# Patient Record
Sex: FEMALE | Race: WHITE | NOT HISPANIC OR LATINO | Employment: FULL TIME | URBAN - METROPOLITAN AREA
[De-identification: names, ages, dates, MRNs, and addresses within clinical notes are randomized per-mention and may not be internally consistent; named-entity substitution may affect disease eponyms.]

---

## 2017-02-02 ENCOUNTER — OFFICE VISIT (OUTPATIENT)
Dept: UROLOGY | Facility: CLINIC | Age: 52
End: 2017-02-02
Payer: COMMERCIAL

## 2017-02-02 VITALS
SYSTOLIC BLOOD PRESSURE: 139 MMHG | TEMPERATURE: 98.2 F | DIASTOLIC BLOOD PRESSURE: 74 MMHG | BODY MASS INDEX: 25.87 KG/M2 | WEIGHT: 146 LBS

## 2017-02-02 DIAGNOSIS — Z85.51 PERSONAL HISTORY OF MALIGNANT NEOPLASM OF BLADDER: Primary | ICD-10-CM

## 2017-02-02 PROCEDURE — 88112 CYTOPATH CELL ENHANCE TECH: CPT | Performed by: UROLOGY

## 2017-02-02 PROCEDURE — 52000 CYSTOURETHROSCOPY: CPT | Performed by: UROLOGY

## 2017-02-02 ASSESSMENT — PAIN SCALES - GENERAL: PAINLEVEL: NO PAIN (0)

## 2017-02-02 NOTE — NURSING NOTE
"Initial /74 mmHg  Temp(Src) 98.2  F (36.8  C) (Oral)  Wt 66.225 kg (146 lb) Estimated body mass index is 25.87 kg/(m^2) as calculated from the following:    Height as of 11/1/16: 1.6 m (5' 3\").    Weight as of this encounter: 66.225 kg (146 lb). .    Anitha Israel LPN    "

## 2017-02-03 LAB — COPATH REPORT: NORMAL

## 2017-02-04 NOTE — PROGRESS NOTES
Appointment source: Established Patient  Patient name: Sangita Montano  Urology Staff: Regis Wright MD    Subjective: This is a 51 year old year old female returning for follow up of bladder cancer    Objective:  Cystoscopy performed today and no evidence of neoplasm was detected within the bladder.    Urine cytology was negative.    Assessment:  History of bladder cancer    Plan:  Repeat cystoscopy in 3 months.

## 2017-05-04 ENCOUNTER — OFFICE VISIT (OUTPATIENT)
Dept: UROLOGY | Facility: CLINIC | Age: 52
End: 2017-05-04
Payer: COMMERCIAL

## 2017-05-04 VITALS
SYSTOLIC BLOOD PRESSURE: 120 MMHG | RESPIRATION RATE: 16 BRPM | DIASTOLIC BLOOD PRESSURE: 72 MMHG | WEIGHT: 146 LBS | HEART RATE: 85 BPM | HEIGHT: 63 IN | BODY MASS INDEX: 25.87 KG/M2

## 2017-05-04 DIAGNOSIS — C67.9 MALIGNANT NEOPLASM OF URINARY BLADDER, UNSPECIFIED SITE (H): Primary | ICD-10-CM

## 2017-05-04 PROCEDURE — 52000 CYSTOURETHROSCOPY: CPT | Performed by: UROLOGY

## 2017-05-04 PROCEDURE — 88112 CYTOPATH CELL ENHANCE TECH: CPT | Performed by: UROLOGY

## 2017-05-04 NOTE — NURSING NOTE
Pt brought back to the procedure room for a cystoscopy per Dr Wright.  Informed consent obtained, pt prepped in a sterile manner and a uro jet was used.      Scope serial number:  [id number]    Shahana Mejia CMA

## 2017-05-04 NOTE — MR AVS SNAPSHOT
After Visit Summary   5/4/2017    Sangita Montano    MRN: [id number]           Patient Information     Date Of Birth          1965        Visit Information        Provider Department      5/4/2017 10:00 AM DONG Wright MD [facility]        Today's Diagnoses     Malignant neoplasm of urinary bladder, unspecified site (H)    -  1      Care Instructions    Per Physician's instructions          Follow-ups after your visit        Your next 10 appointments already scheduled     Aug 08, 2017 10:00 AM CDT   CYSTO with DONG Wright MD   [facility] ([facility]    [address] [place] [address]   [phone]              Who to contact     If you have questions or need follow up information about today's clinic visit or your schedule please contact [facility] directly at [phone].  Normal or non-critical lab and imaging results will be communicated to you by MyChart, letter or phone within 4 business days after the clinic has received the results. If you do not hear from us within 7 days, please contact the clinic through MyChart or phone. If you have a critical or abnormal lab result, we will notify you by phone as soon as possible.  Submit refill requests through [employer] or call your pharmacy and they will forward the refill request to us. Please allow 3 business days for your refill to be completed.          Additional Information About Your Visit        MyChart Information     [employer] gives you secure access to your electronic health record. If you see a primary care provider, you can also send messages to your care team and make appointments. If you have questions, please call your primary care clinic.  If you do not have a primary care provider, please call [phone] and they will assist you.        Care EveryWhere ID     This is your Care EveryWhere ID. This could be used by other organizations to access your  "[place] medical records  [id number]        Your Vitals Were     Pulse Respirations Height BMI (Body Mass Index)          85 16 5' 3\" (1.6 m) 25.86 kg/m2         Blood Pressure from Last 3 Encounters:   05/04/17 120/72   02/02/17 139/74   11/01/16 145/87    Weight from Last 3 Encounters:   05/04/17 146 lb (66.2 kg)   02/02/17 146 lb (66.2 kg)   11/01/16 150 lb (68 kg)              We Performed the Following     CYSTOURETHROSCOPY     Cytology non gyn        Primary Care Provider Office Phone # Fax #    Shara Davida Campos [phone]       [facility] [address] [address] [address]        Thank you!     Thank you for choosing [facility]  for your care. Our goal is always to provide you with excellent care. Hearing back from our patients is one way we can continue to improve our services. Please take a few minutes to complete the written survey that you may receive in the mail after your visit with us. Thank you!             Your Updated Medication List - Protect others around you: Learn how to safely use, store and throw away your medicines at www.disposemymeds.org.          This list is accurate as of: 5/4/17 11:59 PM.  Always use your most recent med list.                   Brand Name Dispense Instructions for use    ascorbic acid 500 MG tablet    VITAMIN C     Take by mouth daily 6992-3867 mg daily       BIFIDOBACTERIUM BIFIDUS      300 mg 2 in am and 1 in pm       BORAGE OIL-GLA-LINOLEIC ACID PO      200 mg GLA, 3000 mg Borage oil, 600 mg Linolenic acid, 30 mg Vitamin E       fish oil-omega-3 fatty acids 1000 MG capsule      Take 1,000 mg by mouth daily 3 tablets daily       GINSENG PO          * HERBALS      cynthia di       * HERBALS      Henriquez marbin       * HERBALS      Henriquez qi       * HERBALS      salvatore king       * HERBALS      X coleen       * HERBALS      sha jd       * HERBALS      pu luang       * HERBALS      atractylodes       * HERBALS      Poria       * " HERBALS      rhemania       * HERBALS      Peony       * HERBALS      Marcia       * HERBALS      astragulus       * HERBALS      cordyceps       * HERBALS      Pseudo Ginseng       * HERBALS      Eclipta       * HERBALS      cordyalis       * HERBALS      atractylodes       * HERBALS      White peony       * HERBALS      Eliazar maohing - immune support       * HERBALS      advaclan - motagenisis       * HERBALS      Crave-control       * HERBALS      Bifidus/acidophilus 900mg takes 3 caps daily 2 in  Am 1 in pm. metagenics estrofactors multivitamin for women daily nutri -dyn dynamic fruits and greens antioxidant powder daily Ortho molecular- ortho digestyzme daily. Martina Riley       L-GLUTAMINE PO      Take 3,000 mg by mouth 1/2 tsp in am and pm       LICORICE          MAGNESIUM GLYCINATE      100 mg qd       NEW MED      Ortho Digestyme: blend of pancreatin, pepsin, bromelain, papain, betaine, taken as directed       Vitamin D-3 5000 UNITS Tabs          * Notice:  This list has 23 medication(s) that are the same as other medications prescribed for you. Read the directions carefully, and ask your doctor or other care provider to review them with you.

## 2017-05-04 NOTE — NURSING NOTE
"Chief Complaint   Patient presents with     Cystoscopy     3 month        Initial /72 (BP Location: Left arm, Patient Position: Chair, Cuff Size: Adult Regular)  Pulse 85  Resp 16  Ht 5' 3\" (1.6 m)  Wt 146 lb (66.2 kg)  BMI 25.86 kg/m2 Estimated body mass index is 25.86 kg/(m^2) as calculated from the following:    Height as of this encounter: 5' 3\" (1.6 m).    Weight as of this encounter: 146 lb (66.2 kg).  BP completed using cuff size: regular      Shahana Mejia CMA     "

## 2017-05-05 LAB — COPATH REPORT: NORMAL

## 2017-05-05 NOTE — PROGRESS NOTES
Appointment source: Established Patient  Patient name: Sangita Montano  Urology Staff: Regis Wright MD    Subjective: This is a 52 year old year old female returning for follow up of low grade bladder cancer.    Objective:  No evidence today of recurrence of bladder cancer on cystoscopy    Plan:  Return in 3 months for repeat cystoscopy

## 2017-08-08 ENCOUNTER — OFFICE VISIT (OUTPATIENT)
Dept: UROLOGY | Facility: CLINIC | Age: 52
End: 2017-08-08
Payer: COMMERCIAL

## 2017-08-08 VITALS
BODY MASS INDEX: 26.58 KG/M2 | DIASTOLIC BLOOD PRESSURE: 81 MMHG | HEART RATE: 85 BPM | SYSTOLIC BLOOD PRESSURE: 130 MMHG | HEIGHT: 63 IN | WEIGHT: 150 LBS | RESPIRATION RATE: 16 BRPM

## 2017-08-08 DIAGNOSIS — C67.2 MALIGNANT NEOPLASM OF LATERAL WALL OF URINARY BLADDER (H): Primary | ICD-10-CM

## 2017-08-08 LAB
ALBUMIN UR-MCNC: NEGATIVE MG/DL
APPEARANCE UR: CLEAR
BACTERIA #/AREA URNS HPF: ABNORMAL /HPF
BILIRUB UR QL STRIP: NEGATIVE
COLOR UR AUTO: YELLOW
GLUCOSE UR STRIP-MCNC: NEGATIVE MG/DL
HGB UR QL STRIP: ABNORMAL
KETONES UR STRIP-MCNC: NEGATIVE MG/DL
LEUKOCYTE ESTERASE UR QL STRIP: NEGATIVE
NITRATE UR QL: NEGATIVE
NON-SQ EPI CELLS #/AREA URNS LPF: ABNORMAL /LPF
PH UR STRIP: 6 PH (ref 5–7)
RBC #/AREA URNS AUTO: ABNORMAL /HPF (ref 0–2)
SP GR UR STRIP: 1.01 (ref 1–1.03)
URN SPEC COLLECT METH UR: ABNORMAL
UROBILINOGEN UR STRIP-ACNC: 0.2 EU/DL (ref 0.2–1)
WBC #/AREA URNS AUTO: ABNORMAL /HPF (ref 0–2)

## 2017-08-08 PROCEDURE — 52000 CYSTOURETHROSCOPY: CPT | Performed by: UROLOGY

## 2017-08-08 PROCEDURE — 81001 URINALYSIS AUTO W/SCOPE: CPT | Performed by: UROLOGY

## 2017-08-08 PROCEDURE — 88112 CYTOPATH CELL ENHANCE TECH: CPT | Performed by: UROLOGY

## 2017-08-08 NOTE — NURSING NOTE
"Chief Complaint   Patient presents with     Cystoscopy       Initial /81 (BP Location: Left arm, Patient Position: Chair, Cuff Size: Adult Regular)  Pulse 85  Resp 16  Ht 1.6 m (5' 3\")  Wt 68 kg (150 lb)  BMI 26.57 kg/m2 Estimated body mass index is 26.57 kg/(m^2) as calculated from the following:    Height as of this encounter: 1.6 m (5' 3\").    Weight as of this encounter: 68 kg (150 lb).  BP completed using cuff size: regular    Pt brought back to the procedure room for a cystoscopy per Dr Wright.  Informed consent obtained, pt prepped in a sterile manner and a uro jet was used.    Scope serial number:    [id number] Cande Mejia CMA  "

## 2017-08-08 NOTE — MR AVS SNAPSHOT
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  Normal or non-critical lab and imaging results will be communicated to you by MyChart, letter or phone within 4 business days after the clinic has received the results. If you do not hear from us within 7 days, please contact the clinic through [employer] or phone. If you have a critical or abnormal lab result, we will notify you by phone as soon as possible.  Submit refill requests through [employer] or call your pharmacy and they will forward the refill request to us. Please allow 3 business days for your refill to be completed.          Additional Information About Your Visit        MyChart Information     [employer] gives you secure access to your electronic health record. If you see a primary care provider, you can also send messages to your care team and make appointments. If you have questions, please call your primary care clinic.  If you do not have a primary care provider, please call [phone] and they will assist you.        Care EveryWhere ID     This is your Care EveryWhere ID. This could be used by other organizations to access your  "[place] medical records  [id number]        Your Vitals Were     Pulse Respirations Height BMI (Body Mass Index)          85 16 1.6 m (5' 3\") 26.57 kg/m2         Blood Pressure from Last 3 Encounters:   08/08/17 130/81   05/04/17 120/72   02/02/17 139/74    Weight from Last 3 Encounters:   08/08/17 68 kg (150 lb)   05/04/17 66.2 kg (146 lb)   02/02/17 66.2 kg (146 lb)              We Performed the Following     CYSTOURETHROSCOPY     Cytology non gyn     UA with Microscopic        Primary Care Provider Office Phone # Fax #    Shara Davida Campos [phone] [address] [address]        Equal Access to Services     TONEY GO : Hadii gucci acuñao Socassidy, waaxda luqadaha, qaybta kaalmada adeegyada, jared vasquez . So [facility] [phone].    ATENCIÓN: Si habla español, tiene a landa disposición servicios gratuitos de asistencia lingüística. Llame al [phone].    We comply with applicable federal civil rights laws and [place] laws. We do not discriminate on the basis of race, color, national origin, age, disability sex, sexual orientation or gender identity.            Thank you!     Thank you for choosing [facility]  for your care. Our goal is always to provide you with excellent care. Hearing back from our patients is one way we can continue to improve our services. Please take a few minutes to complete the written survey that you may receive in the mail after your visit with us. Thank you!             Your Updated Medication List - Protect others around you: Learn how to safely use, store and throw away your medicines at www.disposemymeds.org.          This list is accurate as of: 8/8/17 11:59 PM.  Always use your most recent med list.                   Brand Name Dispense Instructions for use Diagnosis    ascorbic acid 500 MG tablet    VITAMIN C     Take by mouth daily 5802-1998 mg daily        BIFIDOBACTERIUM BIFIDUS      300 " mg 2 in am and 1 in pm        BORAGE OIL-GLA-LINOLEIC ACID PO      200 mg GLA, 3000 mg Borage oil, 600 mg Linolenic acid, 30 mg Vitamin E        fish oil-omega-3 fatty acids 1000 MG capsule      Take 1,000 mg by mouth daily 3 tablets daily        GINSENG PO           * HERBALS      cynthia di        * HERBALS      Henriquez marbin        * HERBALS      Henriquez qi        * HERBALS      salvatore king        * HERBALS      X coleen        * HERBALS      sha jd        * HERBALS      pu luang        * HERBALS      atractylodes        * HERBALS      Poria        * HERBALS      rhemania        * HERBALS      Peony        * HERBALS      Marcia        * HERBALS      astragulus        * HERBALS      cordyceps        * HERBALS      Pseudo Ginseng        * HERBALS      Eclipta        * HERBALS      cordyalis        * HERBALS      atractylodes        * HERBALS      White peony        * HERBALS      Eliazar maohing - immune support        * HERBALS      advaclan - motagenisis        * HERBALS      Crave-control        * HERBALS      Bifidus/acidophilus 900mg takes 3 caps daily 2 in  Am 1 in pm. [employer]ics estrofactors multivitamin for women daily nutri -dyn dynamic fruits and greens antioxidant powder daily Ortho molecular- ortho digestyzme daily. Martina Riley        L-GLUTAMINE PO      Take 3,000 mg by mouth 1/2 tsp in am and pm        LICORICE           MAGNESIUM GLYCINATE      100 mg qd        NEW MED      Ortho Digestyme: blend of pancreatin, pepsin, bromelain, papain, betaine, taken as directed    Dyspepsia and other specified disorders of function of stomach       Vitamin D-3 5000 UNITS Tabs           * Notice:  This list has 23 medication(s) that are the same as other medications prescribed for you. Read the directions carefully, and ask your doctor or other care provider to review them with you.

## 2017-08-09 NOTE — PROGRESS NOTES
Appointment source: Established Patient  Patient name: Sangita Montano  Urology Staff: Regis Wright MD    Subjective: This is a 52 year old year old female returning for follow up of bladder cancer    Objective:  Cystoscopy today revealed no evidence of neoplasm    Assessment:  History of low grade non invasive bladder cancer.    Plan:  Continued surveillance by cystoscopy.

## 2017-08-10 LAB — COPATH REPORT: NORMAL

## 2017-09-09 ENCOUNTER — HEALTH MAINTENANCE LETTER (OUTPATIENT)
Age: 52
End: 2017-09-09

## 2017-11-14 ENCOUNTER — OFFICE VISIT (OUTPATIENT)
Dept: UROLOGY | Facility: CLINIC | Age: 52
End: 2017-11-14
Payer: COMMERCIAL

## 2017-11-14 VITALS — HEART RATE: 88 BPM | RESPIRATION RATE: 16 BRPM | SYSTOLIC BLOOD PRESSURE: 121 MMHG | DIASTOLIC BLOOD PRESSURE: 75 MMHG

## 2017-11-14 DIAGNOSIS — C67.9 MALIGNANT NEOPLASM OF URINARY BLADDER, UNSPECIFIED SITE (H): Primary | ICD-10-CM

## 2017-11-14 PROCEDURE — 52000 CYSTOURETHROSCOPY: CPT | Performed by: UROLOGY

## 2017-11-14 PROCEDURE — 88112 CYTOPATH CELL ENHANCE TECH: CPT | Performed by: UROLOGY

## 2017-11-14 PROCEDURE — 99207 ZZC NO CHARGE LOS: CPT | Performed by: UROLOGY

## 2017-11-14 NOTE — NURSING NOTE
"Chief Complaint   Patient presents with     Cystoscopy     history of bladder cancer        Initial /75 (BP Location: Left arm, Patient Position: Chair, Cuff Size: Adult Regular)  Pulse 88  Resp 16 Estimated body mass index is 26.57 kg/(m^2) as calculated from the following:    Height as of 8/8/17: 1.6 m (5' 3\").    Weight as of 8/8/17: 68 kg (150 lb).  BP completed using cuff size: regular      Shahana Mejia CMA     "

## 2017-11-14 NOTE — MR AVS SNAPSHOT
After Visit Summary   11/14/2017    Sangita Montano    MRN: [id number]           Patient Information     Date Of Birth          1965        Visit Information        Provider Department      11/14/2017 10:15 AM DONG Wright MD [facility]        Today's Diagnoses     Malignant neoplasm of urinary bladder, unspecified site (H)    -  1      Care Instructions    Per Physician's instructions            Follow-ups after your visit        Your next 10 appointments already scheduled     Feb 13, 2018 10:15 AM CST   CYSTO with DONG Wright MD   [facility]    [address] [address]   [phone]              Who to contact     If you have questions or need follow up information about today's clinic visit or your schedule please contact [facility] directly at [phone].  Normal or non-critical lab and imaging results will be communicated to you by MyChart, letter or phone within 4 business days after the clinic has received the results. If you do not hear from us within 7 days, please contact the clinic through MyChart or phone. If you have a critical or abnormal lab result, we will notify you by phone as soon as possible.  Submit refill requests through [employer] or call your pharmacy and they will forward the refill request to us. Please allow 3 business days for your refill to be completed.          Additional Information About Your Visit        MyChart Information     [employer] gives you secure access to your electronic health record. If you see a primary care provider, you can also send messages to your care team and make appointments. If you have questions, please call your primary care clinic.  If you do not have a primary care provider, please call [phone] and they will assist you.        Care EveryWhere ID     This is your Care EveryWhere ID. This could be used by other organizations to access  your [place] medical records  [id number]        Your Vitals Were     Pulse Respirations                88 16           Blood Pressure from Last 3 Encounters:   11/14/17 121/75   08/08/17 130/81   05/04/17 120/72    Weight from Last 3 Encounters:   08/08/17 68 kg (150 lb)   05/04/17 66.2 kg (146 lb)   02/02/17 66.2 kg (146 lb)              We Performed the Following     CYSTOURETHROSCOPY     Cytology non gyn        Primary Care Provider Office Phone # Fax #    [facility] [place] [phone] [address]  [facility] [address]        Equal Access to Services     TONEY GO : Hadii gucci Bales, waaxda lumariselaadaha, qaybta kaalmada dick, jared valle. So [facility] [phone].    ATENCIÓN: Si habla español, tiene a landa disposición servicios gratuitos de asistencia lingüística. Llame al [phone].    We comply with applicable federal civil rights laws and [place] laws. We do not discriminate on the basis of race, color, national origin, age, disability, sex, sexual orientation, or gender identity.            Thank you!     Thank you for choosing [facility]  for your care. Our goal is always to provide you with excellent care. Hearing back from our patients is one way we can continue to improve our services. Please take a few minutes to complete the written survey that you may receive in the mail after your visit with us. Thank you!             Your Updated Medication List - Protect others around you: Learn how to safely use, store and throw away your medicines at www.disposemymeds.org.          This list is accurate as of: 11/14/17 11:59 PM.  Always use your most recent med list.                   Brand Name Dispense Instructions for use Diagnosis    ascorbic acid 500 MG tablet    VITAMIN C     Take by mouth daily 6074-5927 mg daily        BIFIDOBACTERIUM BIFIDUS      300 mg 2 in am and 1 in pm        BORAGE OIL-GLA-LINOLEIC  ACID PO      200 mg GLA, 3000 mg Borage oil, 600 mg Linolenic acid, 30 mg Vitamin E        fish oil-omega-3 fatty acids 1000 MG capsule      Take 1,000 mg by mouth daily 3 tablets daily        GINSENG PO           * HERBALS      cynthia di        * HERBALS      Henriquez marbin        * HERBALS      Henriquez qi        * HERBALS      salvatore king        * HERBALS      X coleen        * HERBALS      sha jd        * HERBALS      pu luang        * HERBALS      atractylodes        * HERBALS      Poria        * HERBALS      rhemania        * HERBALS      Peony        * HERBALS      Marcia        * HERBALS      astragulus        * HERBALS      cordyceps        * HERBALS      Pseudo Ginseng        * HERBALS      Eclipta        * HERBALS      cordyalis        * HERBALS      atractylodes        * HERBALS      White peony        * HERBALS      Eliazar maohing - immune support        * HERBALS      advaclan - motagenisis        * HERBALS      Crave-control        * HERBALS      Bifidus/acidophilus 900mg takes 3 caps daily 2 in  Am 1 in pm. metagenics estrofactors multivitamin for women daily nutri -dyn dynamic fruits and greens antioxidant powder daily Ortho molecular- ortho digestyzme daily. Martina Riley        L-GLUTAMINE PO      Take 3,000 mg by mouth 1/2 tsp in am and pm        LICORICE           MAGNESIUM GLYCINATE      100 mg qd        NEW MED      Ortho Digestyme: blend of pancreatin, pepsin, bromelain, papain, betaine, taken as directed    Dyspepsia and other specified disorders of function of stomach       Vitamin D-3 5000 UNITS Tabs           * Notice:  This list has 23 medication(s) that are the same as other medications prescribed for you. Read the directions carefully, and ask your doctor or other care provider to review them with you.

## 2017-11-14 NOTE — NURSING NOTE
Pt brought back to the procedure room for a cystoscopy per Dr. Wright Informed consent obtained, pt prepped in a sterile manner and a uro jet was used.      Scope serial number: [id number] Cande Mejia CMA

## 2017-11-15 LAB — COPATH REPORT: NORMAL

## 2017-11-15 NOTE — PROGRESS NOTES
Appointment source: Established Patient  Patient name: Sangita Montano  Urology Staff: Regis Wright MD    Subjective: This is a 52 year old year old female returning for follow up of low grade bladder cancer    Objective:  Cystoscopy performed today revealed no evidence of recurrent bladder cancer.    Plan:  Return in 3 months.

## 2018-02-13 ENCOUNTER — OFFICE VISIT (OUTPATIENT)
Dept: UROLOGY | Facility: CLINIC | Age: 53
End: 2018-02-13
Payer: COMMERCIAL

## 2018-02-13 VITALS
SYSTOLIC BLOOD PRESSURE: 120 MMHG | TEMPERATURE: 98.3 F | HEART RATE: 88 BPM | HEIGHT: 63 IN | WEIGHT: 158 LBS | BODY MASS INDEX: 28 KG/M2 | DIASTOLIC BLOOD PRESSURE: 75 MMHG

## 2018-02-13 DIAGNOSIS — C67.9 MALIGNANT NEOPLASM OF URINARY BLADDER, UNSPECIFIED SITE (H): Primary | ICD-10-CM

## 2018-02-13 PROCEDURE — 88112 CYTOPATH CELL ENHANCE TECH: CPT | Performed by: UROLOGY

## 2018-02-13 PROCEDURE — 99207 ZZC NO CHARGE LOS: CPT | Performed by: UROLOGY

## 2018-02-13 PROCEDURE — 52000 CYSTOURETHROSCOPY: CPT | Performed by: UROLOGY

## 2018-02-13 NOTE — MR AVS SNAPSHOT
After Visit Summary   2/13/2018    Sangita Montano    MRN: [id number]           Patient Information     Date Of Birth          1965        Visit Information        Provider Department      2/13/2018 10:15 AM DONG Wright MD [facility]        Today's Diagnoses     Malignant neoplasm of urinary bladder, unspecified site (H)    -  1      Care Instructions    Per Dr. Wright's instructions          Follow-ups after your visit        Your next 10 appointments already scheduled     May 15, 2018 10:15 AM CDT   CYSTO with DONG Wright MD   [facility]    [address] [place] [address]   [phone]              Who to contact     If you have questions or need follow up information about today's clinic visit or your schedule please contact [facility] directly at [phone].  Normal or non-critical lab and imaging results will be communicated to you by MyChart, letter or phone within 4 business days after the clinic has received the results. If you do not hear from us within 7 days, please contact the clinic through [employer]Casth[employer] or phone. If you have a critical or abnormal lab result, we will notify you by phone as soon as possible.  Submit refill requests through [employer] or call your pharmacy and they will forward the refill request to us. Please allow 3 business days for your refill to be completed.          Additional Information About Your Visit        MyChart Information     [employer]owi gives you secure access to your electronic health record. If you see a primary care provider, you can also send messages to your care team and make appointments. If you have questions, please call your primary care clinic.  If you do not have a primary care provider, please call [phone] and they will assist you.        Care EveryWhere ID     This is your Care EveryWhere ID. This could be used by other organizations to access your  "[facility] medical records  [id number]        Your Vitals Were     Pulse Temperature Height BMI (Body Mass Index)          88 98.3  F (36.8  C) (Oral) 1.6 m (5' 3\") 27.99 kg/m2         Blood Pressure from Last 3 Encounters:   02/13/18 120/75   11/14/17 121/75   08/08/17 130/81    Weight from Last 3 Encounters:   02/13/18 71.7 kg (158 lb)   08/08/17 68 kg (150 lb)   05/04/17 66.2 kg (146 lb)              We Performed the Following     CYSTOURETHROSCOPY     Cytology non gyn        Primary Care Provider Office Phone # Fax #    [facility] [phone] [address] [address]        Equal Access to Services     TONEY GO : Ronen Bales, waaxda luqadaha, qaybta kaalmada dick, jared valle. So [facility] [phone].    ATENCIÓN: Si habla español, tiene a landa disposición servicios gratuitos de asistencia lingüística. Paulette al [phone].    We comply with applicable federal civil rights laws and [place] laws. We do not discriminate on the basis of race, color, national origin, age, disability, sex, sexual orientation, or gender identity.            Thank you!     Thank you for choosing [facility]  for your care. Our goal is always to provide you with excellent care. Hearing back from our patients is one way we can continue to improve our services. Please take a few minutes to complete the written survey that you may receive in the mail after your visit with us. Thank you!             Your Updated Medication List - Protect others around you: Learn how to safely use, store and throw away your medicines at www.disposemymeds.org.          This list is accurate as of 2/13/18 10:52 AM.  Always use your most recent med list.                   Brand Name Dispense Instructions for use Diagnosis    ascorbic acid 500 MG tablet    VITAMIN C     Take by mouth daily 7039-4708 mg daily        BIFIDOBACTERIUM BIFIDUS    "   300 mg 2 in am and 1 in pm        BORAGE OIL-GLA-LINOLEIC ACID PO      200 mg GLA, 3000 mg Borage oil, 600 mg Linolenic acid, 30 mg Vitamin E        fish oil-omega-3 fatty acids 1000 MG capsule      Take 1,000 mg by mouth daily 3 tablets daily        GINSENG PO           * HERBALS      cynthia di        * HERBALS      Henriquez marbin        * HERBALS      Henriquez qi        * HERBALS      salvatore king        * HERBALS      X coleen        * HERBALS      sha jd        * HERBALS      pu luang        * HERBALS      atractylodes        * HERBALS      Poria        * HERBALS      rhemania        * HERBALS      Peony        * HERBALS      Marcia        * HERBALS      astragulus        * HERBALS      cordyceps        * HERBALS      Pseudo Ginseng        * HERBALS      Eclipta        * HERBALS      cordyalis        * HERBALS      atractylodes        * HERBALS      White peony        * HERBALS      Eliazar maohing - immune support        * HERBALS      advaclan - motagenisis        * HERBALS      Crave-control        * HERBALS      Bifidus/acidophilus 900mg takes 3 caps daily 2 in  Am 1 in pm. metagenics estrofactors multivitamin for women daily nutri -dyn dynamic fruits and greens antioxidant powder daily Ortho molecular- ortho digestyzme daily. Martina Riley        L-GLUTAMINE PO      Take 3,000 mg by mouth 1/2 tsp in am and pm        LICORICE           MAGNESIUM GLYCINATE      100 mg qd        NEW MED      Ortho Digestyme: blend of pancreatin, pepsin, bromelain, papain, betaine, taken as directed    Dyspepsia and other specified disorders of function of stomach       Vitamin D-3 5000 UNITS Tabs           * Notice:  This list has 23 medication(s) that are the same as other medications prescribed for you. Read the directions carefully, and ask your doctor or other care provider to review them with you.

## 2018-02-13 NOTE — PROGRESS NOTES
Appointment source: Established Patient  Patient name: Sangita Montano  Urology Staff: Regis Wright MD    Subjective: This is a 53 year old year old female returning for follow up of low grade bladder cancer diagnosed in 2013.    Objective:  No evidence of recurrence on today's cystoscopy.    Plan:  Return in 3 months for follow up.

## 2018-02-13 NOTE — NURSING NOTE
Pt brought back to the procedure room for a cystoscopy per Dr. Wright Informed consent obtained, pt prepped in a sterile manner and a uro jet was used.      Scope serial number: [id number] Cande Mejia CMA

## 2018-02-13 NOTE — NURSING NOTE
"Initial /75 (BP Location: Left arm, Patient Position: Sitting, Cuff Size: Adult Regular)  Pulse 88  Temp 98.3  F (36.8  C) (Oral)  Ht 1.6 m (5' 3\")  Wt 71.7 kg (158 lb)  BMI 27.99 kg/m2 Estimated body mass index is 27.99 kg/(m^2) as calculated from the following:    Height as of this encounter: 1.6 m (5' 3\").    Weight as of this encounter: 71.7 kg (158 lb). .    Edith Manzanares MA    "

## 2018-02-14 LAB — COPATH REPORT: NORMAL

## 2018-05-15 ENCOUNTER — OFFICE VISIT (OUTPATIENT)
Dept: UROLOGY | Facility: CLINIC | Age: 53
End: 2018-05-15
Payer: COMMERCIAL

## 2018-05-15 VITALS
SYSTOLIC BLOOD PRESSURE: 122 MMHG | RESPIRATION RATE: 16 BRPM | HEART RATE: 86 BPM | DIASTOLIC BLOOD PRESSURE: 76 MMHG | TEMPERATURE: 98.7 F

## 2018-05-15 DIAGNOSIS — C67.2 MALIGNANT NEOPLASM OF LATERAL WALL OF URINARY BLADDER (H): Primary | ICD-10-CM

## 2018-05-15 PROCEDURE — 52000 CYSTOURETHROSCOPY: CPT | Performed by: UROLOGY

## 2018-05-15 PROCEDURE — 88112 CYTOPATH CELL ENHANCE TECH: CPT | Performed by: UROLOGY

## 2018-05-15 NOTE — NURSING NOTE
"Chief Complaint   Patient presents with     Cystoscopy     History of Bladder Cancer        Initial /76 (BP Location: Right arm, Patient Position: Chair, Cuff Size: Adult Regular)  Pulse 86  Temp 98.7  F (37.1  C) (Tympanic)  Resp 16 Estimated body mass index is 27.99 kg/(m^2) as calculated from the following:    Height as of 2/13/18: 1.6 m (5' 3\").    Weight as of 2/13/18: 71.7 kg (158 lb).  BP completed using cuff size: regular  Medications and allergies reviewed.      Shahana WILLIAM CMA     "

## 2018-05-15 NOTE — PROGRESS NOTES
Appointment source: Established Patient  Patient name: Sangita Montano  Urology Staff: Regis Wright MD    Subjective: This is a 53 year old year old female returning for follow up of bladder cancer    Objective:  Cystoscopy was negative for bladder tumor.    Plan:  Return in 4 months for repeat cystoscopy

## 2018-05-15 NOTE — NURSING NOTE
Pt brought back to the procedure room for a cystoscopy per Dr. Wright Informed consent obtained, pt prepped in a sterile manner and a uro jet was used.      Scope serial number: [id number] Olymp[employer]      Shahana M, CMA

## 2018-05-15 NOTE — MR AVS SNAPSHOT
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  Normal or non-critical lab and imaging results will be communicated to you by MyChart, letter or phone within 4 business days after the clinic has received the results. If you do not hear from us within 7 days, please contact the clinic through Keenkohart or phone. If you have a critical or abnormal lab result, we will notify you by phone as soon as possible.  Submit refill requests through [employer] or call your pharmacy and they will forward the refill request to us. Please allow 3 business days for your refill to be completed.          Additional Information About Your Visit        MyChart Information     [employer] gives you secure access to your electronic health record. If you see a primary care provider, you can also send messages to your care team and make appointments. If you have questions, please call your primary care clinic.  If you do not have a primary care provider, please call [phone] and they will assist you.        Care EveryWhere ID     This is your Care EveryWhere ID. This could be used by other organizations to access your  [place] medical records  [id number]        Your Vitals Were     Pulse Temperature Respirations             86 98.7  F (37.1  C) (Tympanic) 16          Blood Pressure from Last 3 Encounters:   05/15/18 122/76   02/13/18 120/75   11/14/17 121/75    Weight from Last 3 Encounters:   02/13/18 71.7 kg (158 lb)   08/08/17 68 kg (150 lb)   05/04/17 66.2 kg (146 lb)              We Performed the Following     CYSTOURETHROSCOPY (107[address])     Cytology non gyn        Primary Care Provider Office Phone # Fax #    [facility] [phone] [address] [facility] [address]        Equal Access to Services     TONEY GO : Hadii gucci acuñao Socassidy, waaxda luqadaha, qaybta kaalmada adeegyada, jared valle. So [facility] [phone].    ATENCIÓN: Si habla español, tiene a landa disposición servicios gratuitos de asistencia lingüística. [facility] [phone].    We comply with applicable federal civil rights laws and [place] laws. We do not discriminate on the basis of race, color, national origin, age, disability, sex, sexual orientation, or gender identity.            Thank you!     Thank you for choosing [facility]  for your care. Our goal is always to provide you with excellent care. Hearing back from our patients is one way we can continue to improve our services. Please take a few minutes to complete the written survey that you may receive in the mail after your visit with us. Thank you!             Your Updated Medication List - Protect others around you: Learn how to safely use, store and throw away your medicines at www.disposemymeds.org.          This list is accurate as of 5/15/18 10:55 AM.  Always use your most recent med list.                   Brand Name Dispense Instructions for use Diagnosis    ascorbic acid 500 MG tablet    VITAMIN C     Take by mouth daily 6897-0387 mg daily        BIFIDOBACTERIUM BIFIDUS      300 mg 2 in am and 1  in pm        BORAGE OIL-GLA-LINOLEIC ACID PO      200 mg GLA, 3000 mg Borage oil, 600 mg Linolenic acid, 30 mg Vitamin E        fish oil-omega-3 fatty acids 1000 MG capsule      Take 1,000 mg by mouth daily 3 tablets daily        GINSENG PO           * HERBALS      cynthia di        * HERBALS      Henriquez marbin        * HERBALS      Henriquez qi        * HERBALS      salvatore king        * HERBALS      X coleen        * HERBALS      sha jd        * HERBALS      pu luang        * HERBALS      atractylodes        * HERBALS      Poria        * HERBALS      rhemania        * HERBALS      Peony        * HERBALS      Marcia        * HERBALS      astragulus        * HERBALS      cordyceps        * HERBALS      Pseudo Ginseng        * HERBALS      Eclipta        * HERBALS      cordyalis        * HERBALS      atractylodes        * HERBALS      White peony        * HERBALS      Eliazar maohing - immune support        * HERBALS      advaclan - motagenisis        * HERBALS      Crave-control        * HERBALS      Bifidus/acidophilus 900mg takes 3 caps daily 2 in  Am 1 in pm. metagenics estrofactors multivitamin for women daily nutri -dyn dynamic fruits and greens antioxidant powder daily Ortho molecular- ortho digestyzme daily. Martina Riley        L-GLUTAMINE PO      Take 3,000 mg by mouth 1/2 tsp in am and pm        LICORICE           MAGNESIUM GLYCINATE      100 mg qd        NEW MED      Ortho Digestyme: blend of pancreatin, pepsin, bromelain, papain, betaine, taken as directed    Dyspepsia and other specified disorders of function of stomach       Vitamin D-3 5000 units Tabs           * Notice:  This list has 23 medication(s) that are the same as other medications prescribed for you. Read the directions carefully, and ask your doctor or other care provider to review them with you.

## 2018-05-16 LAB — COPATH REPORT: NORMAL

## 2018-09-06 ENCOUNTER — OFFICE VISIT (OUTPATIENT)
Dept: FAMILY MEDICINE | Facility: CLINIC | Age: 53
End: 2018-09-06
Payer: COMMERCIAL

## 2018-09-06 VITALS
DIASTOLIC BLOOD PRESSURE: 68 MMHG | HEART RATE: 80 BPM | WEIGHT: 163.2 LBS | TEMPERATURE: 98.9 F | SYSTOLIC BLOOD PRESSURE: 122 MMHG | BODY MASS INDEX: 28.91 KG/M2

## 2018-09-06 DIAGNOSIS — G44.219 EPISODIC TENSION-TYPE HEADACHE, NOT INTRACTABLE: ICD-10-CM

## 2018-09-06 DIAGNOSIS — R53.83 FATIGUE, UNSPECIFIED TYPE: ICD-10-CM

## 2018-09-06 DIAGNOSIS — R50.9 FEVER, UNSPECIFIED FEVER CAUSE: ICD-10-CM
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
## 2018-09-06 DIAGNOSIS — M79.10 MYALGIA: ICD-10-CM

## 2018-09-06 LAB
ALT SERPL W P-5'-P-CCNC: 34 U/L (ref 0–50)
AST SERPL W P-5'-P-CCNC: 22 U/L (ref 0–45)
BASOPHILS # BLD AUTO: 0 10E9/L (ref 0–0.2)
BASOPHILS NFR BLD AUTO: 0.3 %
DIFFERENTIAL METHOD BLD: NORMAL
EOSINOPHIL # BLD AUTO: 0.2 10E9/L (ref 0–0.7)
EOSINOPHIL NFR BLD AUTO: 1.9 %
ERYTHROCYTE [DISTWIDTH] IN BLOOD BY AUTOMATED COUNT: 13.8 % (ref 10–15)
HCT VFR BLD AUTO: 43.4 % (ref 35–47)
HGB BLD-MCNC: 14.1 G/DL (ref 11.7–15.7)
LYMPHOCYTES # BLD AUTO: 2.4 10E9/L (ref 0.8–5.3)
LYMPHOCYTES NFR BLD AUTO: 26.9 %
MCH RBC QN AUTO: 29.3 PG (ref 26.5–33)
MCHC RBC AUTO-ENTMCNC: 32.5 G/DL (ref 31.5–36.5)
MCV RBC AUTO: 90 FL (ref 78–100)
MONOCYTES # BLD AUTO: 0.8 10E9/L (ref 0–1.3)
MONOCYTES NFR BLD AUTO: 8.8 %
NEUTROPHILS # BLD AUTO: 5.6 10E9/L (ref 1.6–8.3)
NEUTROPHILS NFR BLD AUTO: 62.1 %
PLATELET # BLD AUTO: 293 10E9/L (ref 150–450)
RBC # BLD AUTO: 4.81 10E12/L (ref 3.8–5.2)
WBC # BLD AUTO: 9.1 10E9/L (ref 4–11)

## 2018-09-06 PROCEDURE — 99000 SPECIMEN HANDLING OFFICE-LAB: CPT | Performed by: NURSE PRACTITIONER

## 2018-09-06 PROCEDURE — 84450 TRANSFERASE (AST) (SGOT): CPT | Performed by: NURSE PRACTITIONER

## 2018-09-06 PROCEDURE — 85025 COMPLETE CBC W/AUTO DIFF WBC: CPT | Performed by: NURSE PRACTITIONER

## 2018-09-06 PROCEDURE — 86666 EHRLICHIA ANTIBODY: CPT | Mod: 90 | Performed by: NURSE PRACTITIONER

## 2018-09-06 PROCEDURE — 36415 COLL VENOUS BLD VENIPUNCTURE: CPT | Performed by: NURSE PRACTITIONER

## 2018-09-06 PROCEDURE — 86618 LYME DISEASE ANTIBODY: CPT | Performed by: NURSE PRACTITIONER

## 2018-09-06 PROCEDURE — 99213 OFFICE O/P EST LOW 20 MIN: CPT | Performed by: NURSE PRACTITIONER

## 2018-09-06 PROCEDURE — 84460 ALANINE AMINO (ALT) (SGPT): CPT | Performed by: NURSE PRACTITIONER

## 2018-09-06 NOTE — PATIENT INSTRUCTIONS
We will draw for tick born diseases and contact you when results are returned.    Follow up with your primary care provider if symptoms worsen or do not resolve.    Follow-up with your primary care provider next week and as needed.    Indications for emergent return to emergency department discussed with patient, who verbalized good understanding and agreement.  Patient understands the limitations of today's evaluation.         Lyme Disease  Lyme disease is caused by bacteria. The infection is most often passed during the bite of a deer tick. The tick is very small, so many people with Lyme disease do not know they have been bitten. Tests for Lyme disease are not always accurate early in the disease. If the disease is suspected, treatment may begin before testing confirms the infection. A long course of antibiotics is the standard treatment.  If untreated, Lyme disease can worsen and full-body symptoms can develop          Early local symptoms may appear within a few days to a month after the tick bite. These symptoms may include a round, red rash that looks like a bull's-eye target with darker outer ring and a darker center. There may fever, chills, fatigue, body aches, and headache. In time, the rash goes away, even without treatment. That doesn't mean the infection has gone away, however. In some cases, early local symptoms never develop.    Early disseminated symptoms may appear weeks to months after the bite. These can include muscle aches, fatigue, fever, headache, stiff neck, and joint pain and swelling.    Late-stage symptoms include weakness in an arm, leg or one side of the face, headache, fever, and numbness and tingling in the arms or legs, confusion, and memory loss.  Testing is done for the presence of the bacteria. When the infection is treated early, it can be cured. In some cases, a second or third course of antibiotics may be needed. Be sure to follow your healthcare providers directions about  treatment.  Home care  If oral antibiotics have been prescribed, take them exactly as directed until they are completely gone. Do not stop taking them until you have taken the full course or your healthcare provider has told you to stop.  Ask your healthcare provider about taking over-the-counter medicines to control symptoms such as aches and fever.  Follow-up care  Follow up with your healthcare provider as advised. Be sure to return for follow-up testing as directed to be sure the infection has been treated.  When to seek medical advice  Call your healthcare provider right away if any of the following occur:    Current symptoms get worse    Unexplained fever, neck pain or stiffness, or headache    Arm, leg or facial weakness    Joint pain or swelling    Numbness and tingling in the arms or legs    Confusion or memory loss    Irregular or rapid heartbeat  Date Last Reviewed: 9/25/2015 2000-2017 [employer]. [address] [place] [address]. All rights reserved. This information is not intended as a substitute for professional medical care. Always follow your healthcare professional's instructions.        Understanding Ehrlichiosis  When a tick bites you, it can cause an infection. Some types of ticks can pass on the bacteria that cause ehrlichiosis. This infection can develop into a serious illness. If you get a tick bite and then develop symptoms, talk with your healthcare provider right away.  How to say it:  [id number]   What causes ehrlichiosis?  The bacteria that cause ehrlichiosis are passed to people through tick bites. Ticks are most active between April and September. The risk of getting bitten and infected is higher during these months. Ehrlichiosis may also be passed on through blood transfusions or by direct contact with an infected deer.  What are the symptoms of ehrlichiosis?  Symptoms usually show up 1 to 2 weeks after you are bitten by a tick. Symptoms may  include:    Fever or chills    Headache    Muscle or joint pain    Tiredness or feeling unwell    Swollen lymph nodes    Rash (more common in children)    Red eyes    Nausea, vomiting, or diarrhea    Confusion    Cough  How is ehrlichiosis treated?  Treatment focuses on killing the bacteria that cause ehrlichiosis. This is done by taking antibiotics. Other medicines can help relieve pain.  How can I prevent ehrlichiosis?  Avoiding tick bites is the best way to prevent ehrlichiosis. Here are some ways to avoid getting tick bites:    Put insect repellent containing DEET on exposed skin when you are outside. Use DEET very cautiously on young children.    Treat clothing and hiking or camping gear with products that have permethrin.    Avoid walking through brush and grass.    Look for ticks on yourself when you have been outdoors. Check all parts of your body. Remove any ticks you find right away.    If you have any pets, check them for ticks after they have been outdoors  What are the possible complications of ehrlichiosis?  Children and people with a weak immune system are more likely than healthy adults to have complications. Complications of ehrlichiosis can include:    Difficulty breathing    Bleeding problems    Liver or kidney failure    Inflammation or infection of the brain or its covering    Death  When should I call my healthcare provider?  Call your healthcare provider right away if you have any of these:    Fever of 100.4 F (38 C) or higher, or as directed    Pain that gets worse    Symptoms that don t get better with treatment, or symptoms that get worse    New symptoms   Date Last Reviewed: 3/30/2016    1669-3730 [employer]. [address] [place] [address], [place] [address]. All rights reserved. This information is not intended as a substitute for professional medical care. Always follow your healthcare professional's instructions.

## 2018-09-06 NOTE — NURSING NOTE
"Chief Complaint   Patient presents with     Muscle Pain       Initial /68 (BP Location: Right arm, Cuff Size: Adult Regular)  Pulse 80  Temp 98.9  F (37.2  C) (Tympanic)  Wt 163 lb 3.2 oz (74 kg)  BMI 28.91 kg/m2 Estimated body mass index is 28.91 kg/(m^2) as calculated from the following:    Height as of 2/13/18: 5' 3\" (1.6 m).    Weight as of this encounter: 163 lb 3.2 oz (74 kg).      Health Maintenance that is potentially due pending provider review:  NONE    n/a    Is there anyone who you would like to be able to receive your results? Not Applicable  If yes have patient fill out SHANA    Sobia Bautista M.A.    "

## 2018-09-06 NOTE — MR AVS SNAPSHOT
After Visit Summary   9/6/2018    Sangita Montano    MRN: [id number]           Patient Information     Date Of Birth          1965        Visit Information        Provider Department      9/6/2018 4:20 PM Ember Jules APRN [facility]        Today's Diagnoses     Tick bite, initial encounter    -  1    Episodic tension-type headache, not intractable        Fatigue, unspecified type        Fever, unspecified fever cause        Myalgia          Care Instructions    We will draw for tick born diseases and contact you when results are returned.    Follow up with your primary care provider if symptoms worsen or do not resolve.    Follow-up with your primary care provider next week and as needed.    Indications for emergent return to emergency department discussed with patient, who verbalized good understanding and agreement.  Patient understands the limitations of today's evaluation.         Lyme Disease  Lyme disease is caused by bacteria. The infection is most often passed during the bite of a deer tick. The tick is very small, so many people with Lyme disease do not know they have been bitten. Tests for Lyme disease are not always accurate early in the disease. If the disease is suspected, treatment may begin before testing confirms the infection. A long course of antibiotics is the standard treatment.  If untreated, Lyme disease can worsen and full-body symptoms can develop          Early local symptoms may appear within a few days to a month after the tick bite. These symptoms may include a round, red rash that looks like a bull's-eye target with darker outer ring and a darker center. There may fever, chills, fatigue, body aches, and headache. In time, the rash goes away, even without treatment. That doesn't mean the infection has gone away, however. In some cases, early local symptoms never develop.    Early disseminated symptoms may appear weeks to months  after the bite. These can include muscle aches, fatigue, fever, headache, stiff neck, and joint pain and swelling.    Late-stage symptoms include weakness in an arm, leg or one side of the face, headache, fever, and numbness and tingling in the arms or legs, confusion, and memory loss.  Testing is done for the presence of the bacteria. When the infection is treated early, it can be cured. In some cases, a second or third course of antibiotics may be needed. Be sure to follow your healthcare providers directions about treatment.  Home care  If oral antibiotics have been prescribed, take them exactly as directed until they are completely gone. Do not stop taking them until you have taken the full course or your healthcare provider has told you to stop.  Ask your healthcare provider about taking over-the-counter medicines to control symptoms such as aches and fever.  Follow-up care  Follow up with your healthcare provider as advised. Be sure to return for follow-up testing as directed to be sure the infection has been treated.  When to seek medical advice  Call your healthcare provider right away if any of the following occur:    Current symptoms get worse    Unexplained fever, neck pain or stiffness, or headache    Arm, leg or facial weakness    Joint pain or swelling    Numbness and tingling in the arms or legs    Confusion or memory loss    Irregular or rapid heartbeat  Date Last Reviewed: 9/25/2015 2000-2017 [employer]. [address] [place] [address]. All rights reserved. This information is not intended as a substitute for professional medical care. Always follow your healthcare professional's instructions.        Understanding Ehrlichiosis  When a tick bites you, it can cause an infection. Some types of ticks can pass on the bacteria that cause ehrlichiosis. This infection can develop into a serious illness. If you get a tick bite and then develop symptoms, talk with your healthcare  provider right away.  How to say it:  [id number]   What causes ehrlichiosis?  The bacteria that cause ehrlichiosis are passed to people through tick bites. Ticks are most active between April and September. The risk of getting bitten and infected is higher during these months. Ehrlichiosis may also be passed on through blood transfusions or by direct contact with an infected deer.  What are the symptoms of ehrlichiosis?  Symptoms usually show up 1 to 2 weeks after you are bitten by a tick. Symptoms may include:    Fever or chills    Headache    Muscle or joint pain    Tiredness or feeling unwell    Swollen lymph nodes    Rash (more common in children)    Red eyes    Nausea, vomiting, or diarrhea    Confusion    Cough  How is ehrlichiosis treated?  Treatment focuses on killing the bacteria that cause ehrlichiosis. This is done by taking antibiotics. Other medicines can help relieve pain.  How can I prevent ehrlichiosis?  Avoiding tick bites is the best way to prevent ehrlichiosis. Here are some ways to avoid getting tick bites:    Put insect repellent containing DEET on exposed skin when you are outside. Use DEET very cautiously on young children.    Treat clothing and hiking or camping gear with products that have permethrin.    Avoid walking through brush and grass.    Look for ticks on yourself when you have been outdoors. Check all parts of your body. Remove any ticks you find right away.    If you have any pets, check them for ticks after they have been outdoors  What are the possible complications of ehrlichiosis?  Children and people with a weak immune system are more likely than healthy adults to have complications. Complications of ehrlichiosis can include:    Difficulty breathing    Bleeding problems    Liver or kidney failure    Inflammation or infection of the brain or its covering    Death  When should I call my healthcare provider?  Call your healthcare provider right away if you have any of  these:    Fever of 100.4 F (38 C) or higher, or as directed    Pain that gets worse    Symptoms that don t get better with treatment, or symptoms that get worse    New symptoms   Date Last Reviewed: 3/30/2016    9018-4725 [employer]. [address] [place] [address]. All rights reserved. This information is not intended as a substitute for professional medical care. Always follow your healthcare professional's instructions.                Follow-ups after your visit        Follow-up notes from your care team     See patient instructions section of the AVS Return in about 1 week (around 9/13/2018), or if symptoms worsen or fail to improve, for Follow up with your primary care provider.      Your next 10 appointments already scheduled     Sep 11, 2018 10:15 AM CDT   [facility] D[facility]   [facility]    [address] [place] [id number] [phone]              Who to contact     If you have questions or need follow up information about today's clinic visit or your schedule please contact [facility] directly at [phone].  Normal or non-critical lab and imaging results will be communicated to you by MyChart, letter or phone within 4 business days after the clinic has received the results. If you do not hear from us within 7 days, please contact the clinic through [employer] or phone. If you have a critical or abnormal lab result, we will notify you by phone as soon as possible.  Submit refill requests through [facility] or call your pharmacy and they will forward the refill request to us. Please allow 3 business days for your refill to be completed.          Additional Information About Your Visit        MyChart Information     [employer] gives you secure access to your electronic health record. If you see a primary care provider, you can also send messages to your care team and make appointments. If you have  questions, please call your primary care clinic.  If you do not have a primary care provider, please call [phone] and they will assist you.        Care EveryWhere ID     This is your Care EveryWhere ID. This could be used by other organizations to access your [place] medical records  [id number]        Your Vitals Were     Pulse Temperature BMI (Body Mass Index)             80 98.9  F (37.2  C) (Tympanic) 28.91 kg/m2          Blood Pressure from Last 3 Encounters:   09/06/18 122/68   05/15/18 122/76   02/13/18 120/75    Weight from Last 3 Encounters:   09/06/18 163 lb 3.2 oz (74 kg)   02/13/18 158 lb (71.7 kg)   08/08/17 150 lb (68 kg)              We Performed the Following     ALT     Anaplasma phagocytoph antibody IgG IgM     AST     CBC with platelets and differential     Ehrlichia chaffeenis Abys IgG and IgM     Lyme Disease Chey with reflex to WB Serum        Primary Care Provider Office Phone # Fax #    [facility] [phone] [address] [facility] [address]        Equal Access to Services     TONEY GO : Hadii aad ku hadasho Soomaali, waaxda luqadaha, qaybta kaalmada adeegyada, jared vasquez . So [facility] [phone].    ATENCIÓN: Si habla español, tiene a landa disposición servicios gratuitos de asistencia lingüística. Llame al [phone].    We comply with applicable federal civil rights laws and [place] laws. We do not discriminate on the basis of race, color, national origin, age, disability, sex, sexual orientation, or gender identity.            Thank you!     Thank you for choosing [facility]  for your care. Our goal is always to provide you with excellent care. Hearing back from our patients is one way we can continue to improve our services. Please take a few minutes to complete the written survey that you may receive in the mail after your visit with us. Thank you!             Your Updated Medication  List - Protect others around you: Learn how to safely use, store and throw away your medicines at www.disposemymeds.org.          This list is accurate as of 9/6/18  5:08 PM.  Always use your most recent med list.                   Brand Name Dispense Instructions for use Diagnosis    ascorbic acid 500 MG tablet    VITAMIN C     Take by mouth daily 1290-4135 mg daily        BIFIDOBACTERIUM BIFIDUS      300 mg 2 in am and 1 in pm        BORAGE OIL-GLA-LINOLEIC ACID PO      200 mg GLA, 3000 mg Borage oil, 600 mg Linolenic acid, 30 mg Vitamin E        fish oil-omega-3 fatty acids 1000 MG capsule      Take 1,000 mg by mouth daily 3 tablets daily        GINSENG PO           * HERBALS      cynthia di        * HERBALS      Henriquez marbin        * HERBALS      Henriquez qi        * HERBALS      salvatore king        * HERBALS      X coleen        * HERBALS      sha jd        * HERBALS      pu luang        * HERBALS      atractylodes        * HERBALS      Poria        * HERBALS      rhemania        * HERBALS      Peony        * HERBALS      Marcia        * HERBALS      astragulus        * HERBALS      cordyceps        * HERBALS      Pseudo Ginseng        * HERBALS      Eclipta        * HERBALS      cordyalis        * HERBALS      atractylodes        * HERBALS      White peony        * HERBALS      Eliazar maohing - immune support        * HERBALS      advaclan - motagenisis        * HERBALS      Crave-control        * HERBALS      Bifidus/acidophilus 900mg takes 3 caps daily 2 in  Am 1 in pm. metagenics estrofactors multivitamin for women daily nutri -dyn dynamic fruits and greens antioxidant powder daily Ortho molecular- ortho digestyzme daily. Martina Riley        L-GLUTAMINE PO      Take 3,000 mg by mouth 1/2 tsp in am and pm        LICORICE           MAGNESIUM GLYCINATE      100 mg qd        NEW MED      Ortho Digestyme: blend of pancreatin, pepsin, bromelain, papain, betaine, taken as directed    Dyspepsia and other specified disorders  of function of stomach       Vitamin D-3 5000 units Tabs           * Notice:  This list has 23 medication(s) that are the same as other medications prescribed for you. Read the directions carefully, and ask your doctor or other care provider to review them with you.

## 2018-09-06 NOTE — PROGRESS NOTES
SUBJECTIVE:   Sangita Montano is a 53 year old female who presents to clinic today for the following health issues:    Concern - Check for Lymes   Onset: last week.      Description:   Had all over muscle aches, flu like symptoms, headache, fatigue, fever    Intensity: mild    Progression of Symptoms:  improving    Accompanying Signs & Symptoms:  Notices worse with stress    Previous history of similar problem:   none    Precipitating factors:     Was bitten by a tick a month ago  Worsened by: na    Alleviating factors:  Improved by: na    Therapies Tried and outcome: na         Problem list and histories reviewed & adjusted, as indicated.  Additional history: as documented    Patient Active Problem List   Diagnosis     Senile sebaceous gland hyperplasia     Lentigo     Melanocytic nevus     Neoplasm of uncertain behavior of skin     Abdominal pain     Bladder cancer (H)     CARDIOVASCULAR SCREENING; LDL GOAL LESS THAN 160     Past Surgical History:   Procedure Laterality Date     COLONOSCOPY N/A 3/2/2016    Procedure: COLONOSCOPY;  Surgeon: Bala Pineda MD;  Location: [facility]     CYSTOSCOPY, TRANSURETHRAL RESECTION (TUR) PROSTATE, COMBINED  7/3/2013    Procedure: COMBINED CYSTOSCOPY, TRANSURETHRAL RESECTION (TUR) PROSTATE;  Transurethral Resection of Bladder Cancer;  Surgeon: DONG Wright MD;  Location: [facility]     EYE SURGERY         Social History   Substance Use Topics     Smoking status: Never Smoker     Smokeless tobacco: Never Used     Alcohol use No     Family History   Problem Relation Age of Onset     Cerebrovascular Disease Mother      Hypertension Father      Cancer Father      bladder cancer,  liver cancer     HEART DISEASE Father      HEART DISEASE Brother 46     MI     Breast Cancer No family hx of          Current Outpatient Prescriptions   Medication Sig Dispense Refill     ascorbic acid (VITAMIN C) 500 MG tablet Take by mouth daily 7687-1467 mg daily       BIFIDOBACTERIUM BIFIDUS  300 mg 2 in am and 1 in pm       BORAGE OIL-GLA-LINOLEIC ACID  mg GLA, 3000 mg Borage oil, 600 mg Linolenic acid, 30 mg Vitamin E       Cholecalciferol (VITAMIN D-3) 5000 UNITS TABS        fish oil-omega-3 fatty acids (OMEGA 3) 1000 MG capsule Take 1,000 mg by mouth daily 3 tablets daily       GINSENG PO        HERBALS Pseudo Ginseng       HERBALS Eclipta       HERBALS cordyalis       HERBALS atractylodes       HERBALS White peony       HERBALS Eliazar maohing - immune support       HERBALS advaclan - motagenisis       HERBALS Crave-control       HERBALS Bifidus/acidophilus 900mg takes 3 caps daily 2 in  Am 1 in pm.  metagenics estrofactors multivitamin for women daily  nutri -dyn dynamic fruits and greens antioxidant powder daily  Ortho molecular- ortho digestyzme daily.  Martina HurtadoLPN       HERBALS cynthia di       HERBALS Henriquez marbin       HERBALS Henriquez qi       HERBALS salvatore king       HERBALS X coleen       HERBALS sha jd       HERBALS pu luang       HERBALS atractylodes       HERBALS Poria       HERBALS rhemania       HERBALS Peony       HERBALS Marcia       HERBALS astragulus       HERBALS cordyceps       L-GLUTAMINE PO Take 3,000 mg by mouth 1/2 tsp in am and pm       LICORICE        MAGNESIUM GLYCINATE 100 mg qd       NEW MED Ortho Digestyme: blend of pancreatin, pepsin, bromelain, papain, betaine, taken as directed       Allergies   Allergen Reactions     Penicillins Hives     Sulfa Drugs Hives     Labs reviewed in EPIC    Reviewed and updated as needed this visit by clinical staff  Tobacco  Allergies  Meds  Problems  Med Hx  Surg Hx  Fam Hx  Soc Hx        Reviewed and updated as needed this visit by Provider  Allergies  Meds  Problems         ROS:  Constitutional, HEENT, cardiovascular, pulmonary, GI, , musculoskeletal, neuro, skin, endocrine and psych systems are negative, except as otherwise noted.    OBJECTIVE:     /68 (BP Location: Right arm, Cuff Size: Adult Regular)  Pulse 80   Temp 98.9  F (37.2  C) (Tympanic)  Wt 163 lb 3.2 oz (74 kg)  BMI 28.91 kg/m2  Body mass index is 28.91 kg/(m^2).   GENERAL: healthy, alert and no distress, nontoxic in appearance  EYES: Eyes grossly normal to inspection, PERRL and conjunctivae and sclerae normal  HENT: ear canals and TM's normal, nose and mouth without ulcers or lesions  NECK: no adenopathy, supple with full ROM  RESP: lungs clear to auscultation - no rales, rhonchi or wheezes  CV: regular rate and rhythm, normal S1 S2, no S3 or S4, no murmur, click or rub, no peripheral edema   ABDOMEN: soft, nontender, no hepatosplenomegaly, no masses and bowel sounds normal  MS: no gross musculoskeletal defects noted, no edema  No rash  No joint swelling or redness    Diagnostic Test Results:  No results found for this or any previous visit (from the past 24 hour(s)).    ASSESSMENT/PLAN:     Problem List Items Addressed This Visit     None      Visit Diagnoses     Tick bite, initial encounter    -  Primary    Relevant Orders    Lyme Disease Chey with reflex to WB Serum (Completed)    Anaplasma phagocytoph antibody IgG IgM (Completed)    Ehrlichia chaffeenis Abys IgG and IgM (Completed)    CBC with platelets and differential (Completed)    ALT (Completed)    AST (Completed)    Episodic tension-type headache, not intractable        Relevant Orders    Lyme Disease Chey with reflex to WB Serum (Completed)    Anaplasma phagocytoph antibody IgG IgM (Completed)    Ehrlichia chaffeenis Abys IgG and IgM (Completed)    CBC with platelets and differential (Completed)    ALT (Completed)    AST (Completed)    Fatigue, unspecified type        Relevant Orders    Lyme Disease Chey with reflex to WB Serum (Completed)    Anaplasma phagocytoph antibody IgG IgM (Completed)    Ehrlichia chaffeenis Abys IgG and IgM (Completed)    CBC with platelets and differential (Completed)    ALT (Completed)    AST (Completed)    Fever, unspecified fever cause        Relevant Orders    Lyme Disease  Chey with reflex to WB Serum (Completed)    Anaplasma phagocytoph antibody IgG IgM (Completed)    Ehrlichia chaffeenis Abys IgG and IgM (Completed)    CBC with platelets and differential (Completed)    ALT (Completed)    AST (Completed)    Myalgia        Relevant Orders    Lyme Disease Chey with reflex to WB Serum (Completed)    Anaplasma phagocytoph antibody IgG IgM (Completed)    Ehrlichia chaffeenis Abys IgG and IgM (Completed)    CBC with platelets and differential (Completed)    ALT (Completed)    AST (Completed)               Patient Instructions     We will draw for tick born diseases and contact you when results are returned.    Follow up with your primary care provider if symptoms worsen or do not resolve.    Follow-up with your primary care provider next week and as needed.    Indications for emergent return to emergency department discussed with patient, who verbalized good understanding and agreement.  Patient understands the limitations of today's evaluation.         Lyme Disease  Lyme disease is caused by bacteria. The infection is most often passed during the bite of a deer tick. The tick is very small, so many people with Lyme disease do not know they have been bitten. Tests for Lyme disease are not always accurate early in the disease. If the disease is suspected, treatment may begin before testing confirms the infection. A long course of antibiotics is the standard treatment.  If untreated, Lyme disease can worsen and full-body symptoms can develop          Early local symptoms may appear within a few days to a month after the tick bite. These symptoms may include a round, red rash that looks like a bull's-eye target with darker outer ring and a darker center. There may fever, chills, fatigue, body aches, and headache. In time, the rash goes away, even without treatment. That doesn't mean the infection has gone away, however. In some cases, early local symptoms never develop.    Early disseminated  symptoms may appear weeks to months after the bite. These can include muscle aches, fatigue, fever, headache, stiff neck, and joint pain and swelling.    Late-stage symptoms include weakness in an arm, leg or one side of the face, headache, fever, and numbness and tingling in the arms or legs, confusion, and memory loss.  Testing is done for the presence of the bacteria. When the infection is treated early, it can be cured. In some cases, a second or third course of antibiotics may be needed. Be sure to follow your healthcare providers directions about treatment.  Home care  If oral antibiotics have been prescribed, take them exactly as directed until they are completely gone. Do not stop taking them until you have taken the full course or your healthcare provider has told you to stop.  Ask your healthcare provider about taking over-the-counter medicines to control symptoms such as aches and fever.  Follow-up care  Follow up with your healthcare provider as advised. Be sure to return for follow-up testing as directed to be sure the infection has been treated.  When to seek medical advice  Call your healthcare provider right away if any of the following occur:    Current symptoms get worse    Unexplained fever, neck pain or stiffness, or headache    Arm, leg or facial weakness    Joint pain or swelling    Numbness and tingling in the arms or legs    Confusion or memory loss    Irregular or rapid heartbeat  Date Last Reviewed: 9/25/2015 2000-2017 [employer]. [address] [place] [address]. All rights reserved. This information is not intended as a substitute for professional medical care. Always follow your healthcare professional's instructions.        Understanding Ehrlichiosis  When a tick bites you, it can cause an infection. Some types of ticks can pass on the bacteria that cause ehrlichiosis. This infection can develop into a serious illness. If you get a tick bite and then develop  symptoms, talk with your healthcare provider right away.  How to say it:  [id number]   What causes ehrlichiosis?  The bacteria that cause ehrlichiosis are passed to people through tick bites. Ticks are most active between April and September. The risk of getting bitten and infected is higher during these months. Ehrlichiosis may also be passed on through blood transfusions or by direct contact with an infected deer.  What are the symptoms of ehrlichiosis?  Symptoms usually show up 1 to 2 weeks after you are bitten by a tick. Symptoms may include:    Fever or chills    Headache    Muscle or joint pain    Tiredness or feeling unwell    Swollen lymph nodes    Rash (more common in children)    Red eyes    Nausea, vomiting, or diarrhea    Confusion    Cough  How is ehrlichiosis treated?  Treatment focuses on killing the bacteria that cause ehrlichiosis. This is done by taking antibiotics. Other medicines can help relieve pain.  How can I prevent ehrlichiosis?  Avoiding tick bites is the best way to prevent ehrlichiosis. Here are some ways to avoid getting tick bites:    Put insect repellent containing DEET on exposed skin when you are outside. Use DEET very cautiously on young children.    Treat clothing and hiking or camping gear with products that have permethrin.    Avoid walking through brush and grass.    Look for ticks on yourself when you have been outdoors. Check all parts of your body. Remove any ticks you find right away.    If you have any pets, check them for ticks after they have been outdoors  What are the possible complications of ehrlichiosis?  Children and people with a weak immune system are more likely than healthy adults to have complications. Complications of ehrlichiosis can include:    Difficulty breathing    Bleeding problems    Liver or kidney failure    Inflammation or infection of the brain or its covering    Death  When should I call my healthcare provider?  Call your healthcare  provider right away if you have any of these:    Fever of 100.4 F (38 C) or higher, or as directed    Pain that gets worse    Symptoms that don t get better with treatment, or symptoms that get worse    New symptoms   Date Last Reviewed: 3/30/2016    1154-8597 [employer]. [address] [place] [address]. All rights reserved. This information is not intended as a substitute for professional medical care. Always follow your healthcare professional's instructions.            THA Granados [facility]

## 2018-09-07 LAB — B BURGDOR IGG+IGM SER QL: 0.05 (ref 0–0.89)

## 2018-09-09 LAB
A PHAGOCYTOPH IGG TITR SER IF: NORMAL {TITER}
A PHAGOCYTOPH IGM TITR SER IF: NORMAL {TITER}
E CHAFFEENSIS IGG TITR SER: NORMAL {TITER}
E CHAFFEENSIS IGM TITR SER: NORMAL {TITER}

## 2018-09-11 ENCOUNTER — OFFICE VISIT (OUTPATIENT)
Dept: UROLOGY | Facility: CLINIC | Age: 53
End: 2018-09-11
Payer: COMMERCIAL

## 2018-09-11 VITALS
WEIGHT: 163 LBS | HEART RATE: 91 BPM | RESPIRATION RATE: 18 BRPM | BODY MASS INDEX: 28.88 KG/M2 | HEIGHT: 63 IN | TEMPERATURE: 99.4 F | SYSTOLIC BLOOD PRESSURE: 124 MMHG | DIASTOLIC BLOOD PRESSURE: 80 MMHG

## 2018-09-11 DIAGNOSIS — Z85.51 PERSONAL HISTORY OF MALIGNANT NEOPLASM OF BLADDER: Primary | ICD-10-CM

## 2018-09-11 PROCEDURE — 88112 CYTOPATH CELL ENHANCE TECH: CPT | Performed by: UROLOGY

## 2018-09-11 PROCEDURE — 52000 CYSTOURETHROSCOPY: CPT | Performed by: UROLOGY

## 2018-09-11 NOTE — NURSING NOTE
"Chief Complaint   Patient presents with     Cystoscopy       Initial /80 (BP Location: Left arm, Patient Position: Chair, Cuff Size: Adult Regular)  Pulse 91  Temp 99.4  F (37.4  C) (Tympanic)  Resp 18  Ht 1.6 m (5' 3\")  Wt 73.9 kg (163 lb)  BMI 28.87 kg/m2 Estimated body mass index is 28.87 kg/(m^2) as calculated from the following:    Height as of this encounter: 1.6 m (5' 3\").    Weight as of this encounter: 73.9 kg (163 lb).  BP completed using cuff size: regular  Medications and allergies reviewed.      Shahana WILLIAM CMA     "

## 2018-09-11 NOTE — NURSING NOTE
Pt brought back to the procedure room for a cystoscopy per Dr. Wright Informed consent obtained, pt prepped in a sterile manner and a uro jet was used.      Scope serial number: [id number] Olympus      Shahana M, CMA

## 2018-09-11 NOTE — MR AVS SNAPSHOT
After Visit Summary   9/11/2018    Sangita Montano    MRN: [id number]           Patient Information     Date Of Birth          1965        Visit Information        Provider Department      9/11/2018 10:15 AM DONG Wright MD [facility]        Today's Diagnoses     Personal history of malignant neoplasm of bladder    -  1      Care Instructions    Per Physician's instructions            Follow-ups after your visit        Your next 10 appointments already scheduled     Jan 08, 2019 10:15 AM CST   CYSTO with DONG Wright MD   [facility] ([facility])    [address] [address]   [phone]              Who to contact     If you have questions or need follow up information about today's clinic visit or your schedule please contact [facility] directly at [phone].  Normal or non-critical lab and imaging results will be communicated to you by MyChart, letter or phone within 4 business days after the clinic has received the results. If you do not hear from us within 7 days, please contact the clinic through MyChart or phone. If you have a critical or abnormal lab result, we will notify you by phone as soon as possible.  Submit refill requests through [employer] ( [employer] ) or call your pharmacy and they will forward the refill request to us. Please allow 3 business days for your refill to be completed.          Additional Information About Your Visit        MyChart Information     [employer] ( [employer] ) gives you secure access to your electronic health record. If you see a primary care provider, you can also send messages to your care team and make appointments. If you have questions, please call your primary care clinic.  If you do not have a primary care provider, please call [phone] and they will assist you.        Care EveryWhere ID     This is your Care EveryWhere ID. This could be used by other organizations to access your [place]  "medical records  [id number]        Your Vitals Were     Pulse Temperature Respirations Height BMI (Body Mass Index)       91 99.4  F (37.4  C) (Tympanic) 18 1.6 m (5' 3\") 28.87 kg/m2        Blood Pressure from Last 3 Encounters:   09/11/18 124/80   09/06/18 122/68   05/15/18 122/76    Weight from Last 3 Encounters:   09/11/18 73.9 kg (163 lb)   09/06/18 74 kg (163 lb 3.2 oz)   02/13/18 71.7 kg (158 lb)              We Performed the Following     CYSTOURETHROSCOPY ([id number])     Cytology non gyn        Primary Care Provider Office Phone # Fax #    [facility] [phone] [address] [facility] [address]        Equal Access to Services     TONEY GO : Hadii gucci euceda Socassidy, waaxda luqadaha, qaybta kaalmada dick, jared vasquez . So [facility] [phone].    ATENCIÓN: Si habla español, tiene a landa disposición servicios gratuitos de asistencia lingüística. Llame al [phone].    We comply with applicable federal civil rights laws and [place] laws. We do not discriminate on the basis of race, color, national origin, age, disability, sex, sexual orientation, or gender identity.            Thank you!     Thank you for choosing [facility]  for your care. Our goal is always to provide you with excellent care. Hearing back from our patients is one way we can continue to improve our services. Please take a few minutes to complete the written survey that you may receive in the mail after your visit with us. Thank you!             Your Updated Medication List - Protect others around you: Learn how to safely use, store and throw away your medicines at www.disposemymeds.org.          This list is accurate as of 9/11/18  3:25 PM.  Always use your most recent med list.                   Brand Name Dispense Instructions for use Diagnosis    ascorbic acid 500 MG tablet    VITAMIN C     Take by mouth daily 2412-9787 mg daily        " BIFIDOBACTERIUM BIFIDUS      300 mg 2 in am and 1 in pm        BORAGE OIL-GLA-LINOLEIC ACID PO      200 mg GLA, 3000 mg Borage oil, 600 mg Linolenic acid, 30 mg Vitamin E        fish oil-omega-3 fatty acids 1000 MG capsule      Take 1,000 mg by mouth daily 3 tablets daily        GINSENG PO           * HERBALS      cynthia di        * HERBALS      Henriquez marbin        * HERBALS      Henriquez qi        * HERBALS      salvatore king        * HERBALS      X coleen        * HERBALS      sha jd        * HERBALS      pu luang        * HERBALS      atractylodes        * HERBALS      Poria        * HERBALS      rhemania        * HERBALS      Peony        * HERBALS      Marcia        * HERBALS      astragulus        * HERBALS      cordyceps        * HERBALS      Pseudo Ginseng        * HERBALS      Eclipta        * HERBALS      cordyalis        * HERBALS      atractylodes        * HERBALS      White peony        * HERBALS      Eliazar maohing - immune support        * HERBALS      advaclan - motagenisis        * HERBALS      Crave-control        * HERBALS      Bifidus/acidophilus 900mg takes 3 caps daily 2 in  Am 1 in pm. metagenics estrofactors multivitamin for women daily nutri -dyn dynamic fruits and greens antioxidant powder daily Ortho molecular- ortho digestyzme daily. Martina Riley        L-GLUTAMINE PO      Take 3,000 mg by mouth 1/2 tsp in am and pm        LICORICE           MAGNESIUM GLYCINATE      100 mg qd        NEW MED      Ortho Digestyme: blend of pancreatin, pepsin, bromelain, papain, betaine, taken as directed    Dyspepsia and other specified disorders of function of stomach       Vitamin D-3 5000 units Tabs           * Notice:  This list has 23 medication(s) that are the same as other medications prescribed for you. Read the directions carefully, and ask your doctor or other care provider to review them with you.

## 2018-09-13 LAB — COPATH REPORT: NORMAL

## 2018-09-16 ENCOUNTER — HEALTH MAINTENANCE LETTER (OUTPATIENT)
Age: 53
End: 2018-09-16

## 2018-09-17 ENCOUNTER — OFFICE VISIT (OUTPATIENT)
Dept: FAMILY MEDICINE | Facility: CLINIC | Age: 53
End: 2018-09-17
Payer: COMMERCIAL

## 2018-09-17 VITALS
DIASTOLIC BLOOD PRESSURE: 62 MMHG | BODY MASS INDEX: 29.06 KG/M2 | HEIGHT: 63 IN | SYSTOLIC BLOOD PRESSURE: 120 MMHG | TEMPERATURE: 97.6 F | HEART RATE: 90 BPM | WEIGHT: 164 LBS | RESPIRATION RATE: 16 BRPM | OXYGEN SATURATION: 95 %

## 2018-09-17 DIAGNOSIS — B35.1 ONYCHOMYCOSIS: Primary | ICD-10-CM

## 2018-09-17 PROCEDURE — 99213 OFFICE O/P EST LOW 20 MIN: CPT | Performed by: NURSE PRACTITIONER

## 2018-09-17 ASSESSMENT — PAIN SCALES - GENERAL: PAINLEVEL: MODERATE PAIN (4)

## 2018-09-17 NOTE — PATIENT INSTRUCTIONS
Follow up with podiatry for further evaluation.  Follow up if symptoms persist or worsen and as needed.        Thank you for choosing [facility].  You may be receiving a survey in the mail from Miky Barksdale regarding your visit today.  Please take a few minutes to complete and return the survey to let us know how we are doing.      Our Clinic hours are:  Mondays    7:20 am - 7 pm  Tues -  Fri  7:20 am - 5 pm    Clinic Phone: [phone]    The clinic lab opens at 7:30 am Mon - Fri and appointments are required.    [facility]. [phone]  Monday  8 am - 7pm  Tues - Fri 8 am - 5:30 pm

## 2018-09-17 NOTE — MR AVS SNAPSHOT
After Visit Summary   9/17/2018    Sangita Montano    MRN: [id number]           Patient Information     Date Of Birth          1965        Visit Information        Provider Department      9/17/2018 2:00 PM Naomi Benítez APRN CNP [facility]        Today's Diagnoses     Onychomycosis    -  1      Care Instructions    Follow up with podiatry for further evaluation.  Follow up if symptoms persist or worsen and as needed.        Thank you for choosing [facility].  You may be receiving a survey in the mail from [employer] regarding your visit today.  Please take a few minutes to complete and return the survey to let us know how we are doing.      Our Clinic hours are:  Mondays    7:20 am - 7 pm  Tues -  Fri  7:20 am - 5 pm    Clinic Phone: [phone]    The clinic lab opens at 7:30 am Mon - Fri and appointments are required.    [facility]  Ph. [phone]  Monday  8 am - 7pm  Tues - Fri 8 am - 5:30 pm                 Follow-ups after your visit        Additional Services     ORTHO  REFERRAL       [facility] is referring you to the Orthopedic  Services at [facility].       The  Representative will assist you in the coordination of your Orthopedic and Musculoskeletal Care as prescribed by your physician.    The  Representative will call you within 1 business day to help schedule your appointment, or you may contact the  Representative at:    All areas ~ [phone]     Type of Referral : [facility]       Timeframe requested: 3 - 5 days    Coverage of these services is subject to the terms and limitations of your health insurance plan.  Please call member services at your health plan with any benefit or coverage questions.      If X-rays, CT or MRI's have been performed, please contact the facility where they were done to arrange  "for , prior to your scheduled appointment.  Please bring this referral request to your appointment and present it to your specialist.                  Follow-up notes from your care team     Return if symptoms worsen or fail to improve.      Your next 10 appointments already scheduled     Jan 08, 2019 10:15 AM CST   CYSTO with DONG Wright MD   [facility]    [address] [address] [phone]              Who to contact     If you have questions or need follow up information about today's clinic visit or your schedule please contact [facility] directly at [phone].  Normal or non-critical lab and imaging results will be communicated to you by MyChart, letter or phone within 4 business days after the clinic has received the results. If you do not hear from us within 7 days, please contact the clinic through MyChart or phone. If you have a critical or abnormal lab result, we will notify you by phone as soon as possible.  Submit refill requests through [employer] or call your pharmacy and they will forward the refill request to us. Please allow 3 business days for your refill to be completed.          Additional Information About Your Visit        MyChart Information     [employer] gives you secure access to your electronic health record. If you see a primary care provider, you can also send messages to your care team and make appointments. If you have questions, please call your primary care clinic.  If you do not have a primary care provider, please call [phone] and they will assist you.        Care EveryWhere ID     This is your Care EveryWhere ID. This could be used by other organizations to access your [place] medical records  [id number]        Your Vitals Were     Pulse Temperature Respirations Height Last Period Pulse Oximetry    90 97.6  F (36.4  C) (Oral) 16 5' 3\" (1.6 m) 08/23/2018 (Approximate) 95%    BMI " (Body Mass Index)                   29.05 kg/m2            Blood Pressure from Last 3 Encounters:   09/17/18 120/62   09/11/18 124/80   09/06/18 122/68    Weight from Last 3 Encounters:   09/17/18 164 lb (74.4 kg)   09/11/18 163 lb (73.9 kg)   09/06/18 163 lb 3.2 oz (74 kg)              We Performed the Following     ORTHO  REFERRAL        Primary Care Provider Office Phone # Fax #    [facility] [phone] [address] [address]        Equal Access to Services     TONEY GO : Hadii gucci wills hadasho Sokiyaali, waaxda luqadaha, qaybta kaalmada adeqianayada, jared vasquez . So [facility] [phone].    ATENCIÓN: Si habla español, tiene a landa disposición servicios gratuitos de asistencia lingüística. Llame al [phone].    We comply with applicable federal civil rights laws and [place] laws. We do not discriminate on the basis of race, color, national origin, age, disability, sex, sexual orientation, or gender identity.            Thank you!     Thank you for choosing [facility]  for your care. Our goal is always to provide you with excellent care. Hearing back from our patients is one way we can continue to improve our services. Please take a few minutes to complete the written survey that you may receive in the mail after your visit with us. Thank you!             Your Updated Medication List - Protect others around you: Learn how to safely use, store and throw away your medicines at www.disposemymeds.org.          This list is accurate as of 9/17/18  2:24 PM.  Always use your most recent med list.                   Brand Name Dispense Instructions for use Diagnosis    ascorbic acid 500 MG tablet    VITAMIN C     Take by mouth daily 8242-3219 mg daily        BIFIDOBACTERIUM BIFIDUS      300 mg 2 in am and 1 in pm        BORAGE OIL-GLA-LINOLEIC ACID PO      200 mg GLA, 3000 mg Borage oil, 600 mg Linolenic acid,  30 mg Vitamin E        fish oil-omega-3 fatty acids 1000 MG capsule      Take 1,000 mg by mouth daily 3 tablets daily        GINSENG PO           * HERBALS      cynthia di        * HERBALS      Henriquez marbin        * HERBALS      Henriquez qi        * HERBALS      salvatore king        * HERBALS      X coleen        * HERBALS      sha jd        * HERBALS      pu luang        * HERBALS      atractylodes        * HERBALS      Poria        * HERBALS      rhemania        * HERBALS      Peony        * HERBALS      Marcia        * HERBALS      astragulus        * HERBALS      cordyceps        * HERBALS      Pseudo Ginseng        * HERBALS      Eclipta        * HERBALS      cordyalis        * HERBALS      atractylodes        * HERBALS      White peony        * HERBALS      Eliazar maohing - immune support        * HERBALS      advaclan - motagenisis        * HERBALS      Crave-control        * HERBALS      Bifidus/acidophilus 900mg takes 3 caps daily 2 in  Am 1 in pm. metagenics estrofactors multivitamin for women daily nutri -dyn dynamic fruits and greens antioxidant powder daily Ortho molecular- ortho digestyzme daily. Martina Riley        L-GLUTAMINE PO      Take 3,000 mg by mouth 1/2 tsp in am and pm        LICORICE           MAGNESIUM GLYCINATE      100 mg qd        NEW MED      Ortho Digestyme: blend of pancreatin, pepsin, bromelain, papain, betaine, taken as directed    Dyspepsia and other specified disorders of function of stomach       Vitamin D-3 5000 units Tabs           * Notice:  This list has 23 medication(s) that are the same as other medications prescribed for you. Read the directions carefully, and ask your doctor or other care provider to review them with you.

## 2018-09-17 NOTE — PROGRESS NOTES
SUBJECTIVE:   Sangita Montano is a 53 year old female who presents to clinic today for the following health issues:      1 1/2 years ago her left big toenail got caught in a door and toenail tore off and has not grown back normal and now is starting to hurt.        Problem list and histories reviewed & adjusted, as indicated.  Additional history: toenail is thickened, she states she would not be opposed to having it permanently removed      Patient Active Problem List   Diagnosis     Senile sebaceous gland hyperplasia     Lentigo     Melanocytic nevus     Neoplasm of uncertain behavior of skin     Abdominal pain     Bladder cancer (H)     CARDIOVASCULAR SCREENING; LDL GOAL LESS THAN 160     Past Surgical History:   Procedure Laterality Date     COLONOSCOPY N/A 3/2/2016    Procedure: COLONOSCOPY;  Surgeon: Bala Pineda MD;  Location: [facility]     CYSTOSCOPY, TRANSURETHRAL RESECTION (TUR) PROSTATE, COMBINED  7/3/2013    Procedure: COMBINED CYSTOSCOPY, TRANSURETHRAL RESECTION (TUR) PROSTATE;  Transurethral Resection of Bladder Cancer;  Surgeon: DONG Wright MD;  Location: [facility]     EYE SURGERY         Social History   Substance Use Topics     Smoking status: Never Smoker     Smokeless tobacco: Never Used     Alcohol use No     Family History   Problem Relation Age of Onset     Cerebrovascular Disease Mother      Hypertension Father      Cancer Father      bladder cancer,  liver cancer     HEART DISEASE Father      HEART DISEASE Brother 46     MI     Breast Cancer No family hx of          Current Outpatient Prescriptions   Medication Sig Dispense Refill     ascorbic acid (VITAMIN C) 500 MG tablet Take by mouth daily 2403-1481 mg daily       BIFIDOBACTERIUM BIFIDUS 300 mg 2 in am and 1 in pm       BORAGE OIL-GLA-LINOLEIC ACID  mg GLA, 3000 mg Borage oil, 600 mg Linolenic acid, 30 mg Vitamin E       Cholecalciferol (VITAMIN D-3) 5000 UNITS TABS        fish oil-omega-3 fatty acids (OMEGA 3) 1000 MG  "capsule Take 1,000 mg by mouth daily 3 tablets daily       GINSENG PO        HERBALS Pseudo Ginseng       HERBALS Eclipta       HERBALS cordyalis       HERBALS atractylodes       HERBALS White peony       HERBALS Eliazar maohing - immune support       HERBALS advaclan - motagenisis       HERBALS Crave-control       HERBALS Bifidus/acidophilus 900mg takes 3 caps daily 2 in  Am 1 in pm.  metagenics estrofactors multivitamin for women daily  nutri -dyn dynamic fruits and greens antioxidant powder daily  Ortho molecular- ortho digestyzme daily.  Martina VanonLPN       HERBALS cynthia di       HERBALS Henriquez marbin       HERBALS Henriquez qi       HERBALS salvatore king       HERBALS X coleen       HERBALS sha jd       HERBALS pu luang       HERBALS atractylodes       HERBALS Poria       HERBALS rhemania       HERBALS Peony       HERBALS Marcia       HERBALS astragulus       HERBALS cordyceps       L-GLUTAMINE PO Take 3,000 mg by mouth 1/2 tsp in am and pm       LICORICE        MAGNESIUM GLYCINATE 100 mg qd       NEW MED Ortho Digestyme: blend of pancreatin, pepsin, bromelain, papain, betaine, taken as directed       Allergies   Allergen Reactions     Penicillins Hives     Sulfa Drugs Hives     BP Readings from Last 3 Encounters:   09/17/18 120/62   09/11/18 124/80   09/06/18 122/68    Wt Readings from Last 3 Encounters:   09/17/18 164 lb (74.4 kg)   09/11/18 163 lb (73.9 kg)   09/06/18 163 lb 3.2 oz (74 kg)                    Reviewed and updated as needed this visit by clinical staff  Tobacco  Allergies  Meds  Med Hx  Surg Hx  Fam Hx  Soc Hx      Reviewed and updated as needed this visit by Provider          ROS: 10 point ROS neg other than the symptoms noted above in the HPI.    OBJECTIVE:     /62 (BP Location: Right arm, Patient Position: Chair, Cuff Size: Adult Regular)  Pulse 90  Temp 97.6  F (36.4  C) (Oral)  Resp 16  Ht 5' 3\" (1.6 m)  Wt 164 lb (74.4 kg)  LMP 08/23/2018 (Approximate)  SpO2 95%  BMI 29.05 " kg/m2  Body mass index is 29.05 kg/(m^2).  GENERAL: healthy, alert and no distress  NECK: no asymmetry  RESP: lungs clear   CV: regular rate and rhythm  MS: no gross musculoskeletal defects noted other than thickened pinky and great toe nail of left foot, mild erythema and pain around great toenail of left, no discharge or evidence of infection or ingrown toenail      Diagnostic Test Results:  No results found for this or any previous visit (from the past 24 hour(s)).    ASSESSMENT/PLAN:             1. Onychomycosis    - ORTHO  REFERRAL  She will follow up with podiatry for consideration into permanent removal of toenail.    See Patient Instructions  Patient Instructions   Follow up with podiatry for further evaluation.  Follow up if symptoms persist or worsen and as needed.        Thank you for choosing [facility].  You may be receiving a survey in the mail from [employer] regarding your visit today.  Please take a few minutes to complete and return the survey to let us know how we are doing.      Our Clinic hours are:  Mondays    7:20 am - 7 pm  Tues -  Fri  7:20 am - 5 pm    Clinic Phone: [phone]    The clinic lab opens at 7:30 am Mon - Fri and appointments are required.    [facility]  Ph. [phone]  Monday  8 am - 7pm  Tues - Fri 8 am - 5:30 pm             THA Davenport CNP  [facility]

## 2018-09-20 ENCOUNTER — OFFICE VISIT (OUTPATIENT)
Dept: PODIATRY | Facility: CLINIC | Age: 53
End: 2018-09-20
Payer: COMMERCIAL

## 2018-09-20 VITALS
BODY MASS INDEX: 29.06 KG/M2 | WEIGHT: 164 LBS | HEIGHT: 63 IN | HEART RATE: 98 BPM | DIASTOLIC BLOOD PRESSURE: 74 MMHG | SYSTOLIC BLOOD PRESSURE: 127 MMHG

## 2018-09-20 DIAGNOSIS — L60.2 OG (ONYCHOGRYPHOSIS): Primary | ICD-10-CM

## 2018-09-20 PROCEDURE — 99203 OFFICE O/P NEW LOW 30 MIN: CPT | Performed by: PODIATRIST

## 2018-09-20 NOTE — MR AVS SNAPSHOT
After Visit Summary   9/20/2018    Sangita Montano    MRN: [id number]           Patient Information     Date Of Birth          1965        Visit Information        Provider Department      9/20/2018 9:40 AM Sagar Rogers DPM [facility] [place]        Today's Diagnoses     OG (onychogryphosis)    -  1      Care Instructions    You have elected to proceed with Surgery for total surgical nail matrixectomy left great toe  Surgeries are performed on Tuesdays at [facility].   To schedule your surgery date please call [phone].  Please leave a message with a good time for our staff to call you back.    - Please have a date in mind for your surgery, you can feel free to leave that date on the message, and we will schedule and call back to confirm.     You can expect receive a call back the same day or on the next business day from Dr. Rogers s team to assist in the scheduling.   - We will schedule the date of your surgery.  The time will be determined a few days ahead of time.  You can expect a call from Same Day Surgery 2-3 days ahead of time with specific instructions for what time to arrive at the hospital as well as any other preparations you should take prior to surgery.    - You may need to obtain a pre-operative physical from your primary medical provider. This must be done within 30 days of your surgery date.    - We will also schedule your first post-operative appointment for a bandage and wound check for the Monday following your surgery at the [place] location.    - You may be non-weight bearing for a period of up to 6 weeks.  Options for this include: (Please indicate which you would prefer so we can provide you with an order and instructions)  o Crutches  o Walker  o Roll-a-bout knee walker.    - If you will need paperwork filled out for your employer you may drop those off at the clinic directly or you may have those faxed to us at  [phone].  Please indicate on the form the date you would like the FMLA to begin if it will not be your surgery date.    The forms are typically filled out for up to 12 weeks, however you may be cleared to return prior to that time depending on your individual healing and job requirements.              Follow-ups after your visit        Your next 10 appointments already scheduled     Jan 08, 2019 10:15 AM CST   CYSTO with DONG Wright MD   [facility]    [address] [address] [phone]              Who to contact     If you have questions or need follow up information about today's clinic visit or your schedule please contact [facility] directly at [phone].  Normal or non-critical lab and imaging results will be communicated to you by MyChart, letter or phone within 4 business days after the clinic has received the results. If you do not hear from us within 7 days, please contact the clinic through MyChart or phone. If you have a critical or abnormal lab result, we will notify you by phone as soon as possible.  Submit refill requests through [employer] or call your pharmacy and they will forward the refill request to us. Please allow 3 business days for your refill to be completed.          Additional Information About Your Visit        [employer] Information     [employer] gives you secure access to your electronic health record. If you see a primary care provider, you can also send messages to your care team and make appointments. If you have questions, please call your primary care clinic.  If you do not have a primary care provider, please call [phone] and they will assist you.        Care EveryWhere ID     This is your Care EveryWhere ID. This could be used by other organizations to access your [place] medical records  [id number]        Your Vitals Were     Pulse Height Last Period BMI (Body Mass  "Index)          98 5' 3\" (1.6 m) 08/23/2018 (Approximate) 29.05 kg/m2         Blood Pressure from Last 3 Encounters:   09/20/18 127/74   09/17/18 120/62   09/11/18 124/80    Weight from Last 3 Encounters:   09/20/18 164 lb (74.4 kg)   09/17/18 164 lb (74.4 kg)   09/11/18 163 lb (73.9 kg)              We Performed the Following     Dliia-Operative Worksheet        Primary Care Provider Office Phone # Fax #    [facility] [phone] [address] [address]        Equal Access to Services     TONEY GO : Ronen Bales, walatriceda thiago, qaybta kaalmada adeqianayacrystal, jared vlale. So [facility] [phone].    ATENCIÓN: Si habla español, tiene a landa disposición servicios gratuitos de asistencia lingüística. Llame al [phone].    We comply with applicable federal civil rights laws and [place] laws. We do not discriminate on the basis of race, color, national origin, age, disability, sex, sexual orientation, or gender identity.            Thank you!     Thank you for choosing [facility]  for your care. Our goal is always to provide you with excellent care. Hearing back from our patients is one way we can continue to improve our services. Please take a few minutes to complete the written survey that you may receive in the mail after your visit with us. Thank you!             Your Updated Medication List - Protect others around you: Learn how to safely use, store and throw away your medicines at www.disposemymeds.org.          This list is accurate as of 9/20/18 10:05 AM.  Always use your most recent med list.                   Brand Name Dispense Instructions for use Diagnosis    ascorbic acid 500 MG tablet    VITAMIN C     Take by mouth daily 9668-5468 mg daily        BIFIDOBACTERIUM BIFIDUS      300 mg 2 in am and 1 in pm        BORAGE OIL-GLA-LINOLEIC ACID PO      200 mg GLA, 3000 mg Borage " oil, 600 mg Linolenic acid, 30 mg Vitamin E        fish oil-omega-3 fatty acids 1000 MG capsule      Take 1,000 mg by mouth daily 3 tablets daily        GINSENG PO           * HERBALS      cynthia di        * HERBALS      Henriquez marbin        * HERBALS      Henriquez qi        * HERBALS      salvatore king        * HERBALS      X coleen        * HERBALS      sha jd        * HERBALS      pu luang        * HERBALS      atractylodes        * HERBALS      Poria        * HERBALS      rhemania        * HERBALS      Peony        * HERBALS      Marcia        * HERBALS      astragulus        * HERBALS      cordyceps        * HERBALS      Pseudo Ginseng        * HERBALS      Eclipta        * HERBALS      cordyalis        * HERBALS      atractylodes        * HERBALS      White peony        * HERBALS      Eliazar maohing - immune support        * HERBALS      advaclan - motagenisis        * HERBALS      Crave-control        * HERBALS      Bifidus/acidophilus 900mg takes 3 caps daily 2 in  Am 1 in pm. metagenics estrofactors multivitamin for women daily nutri -dyn dynamic fruits and greens antioxidant powder daily Ortho molecular- ortho digestyzme daily. Martina Riley        L-GLUTAMINE PO      Take 3,000 mg by mouth 1/2 tsp in am and pm        LICORICE           MAGNESIUM GLYCINATE      100 mg qd        NEW MED      Ortho Digestyme: blend of pancreatin, pepsin, bromelain, papain, betaine, taken as directed    Dyspepsia and other specified disorders of function of stomach       Vitamin D-3 5000 units Tabs           * Notice:  This list has 23 medication(s) that are the same as other medications prescribed for you. Read the directions carefully, and ask your doctor or other care provider to review them with you.

## 2018-09-20 NOTE — PROGRESS NOTES
Sangita Montano is a 53 year old female who presents with a chief complain of a painful ingrown toenail to the left great toe.  The patient relates the ingrown nail was first noticed several weeks ago and has steadily become worse.  The patient relates the medial nail border is inflamed and sore to the touch.  The patient relates no bloody drainage.  The patient denies any redness extending up the big toe.  The patient has been treating the big toe by soaking it in salt water and antibiotics with no relief.       REVIEW OF SYSTEMS:  Constitutional, HEENT, cardiovascular, pulmonary, GI, , musculoskeletal, neuro, skin, endocrine and psych systems are negative, except as otherwise noted.     PAST MEDICAL HISTORY:   Past Medical History:   Diagnosis Date     Bladder cancer (H)     7/2013        PAST SURGICAL HISTORY:   Past Surgical History:   Procedure Laterality Date     COLONOSCOPY N/A 3/2/2016    Procedure: COLONOSCOPY;  Surgeon: Bala Pineda MD;  Location: [facility]     CYSTOSCOPY, TRANSURETHRAL RESECTION (TUR) PROSTATE, COMBINED  7/3/2013    Procedure: COMBINED CYSTOSCOPY, TRANSURETHRAL RESECTION (TUR) PROSTATE;  Transurethral Resection of Bladder Cancer;  Surgeon: DONG Wright MD;  Location: [facility]     EYE SURGERY          MEDICATIONS:   Current Outpatient Prescriptions:      ascorbic acid (VITAMIN C) 500 MG tablet, Take by mouth daily 7218-5148 mg daily, Disp: , Rfl:      BIFIDOBACTERIUM BIFIDUS, 300 mg 2 in am and 1 in pm, Disp: , Rfl:      BORAGE OIL-GLA-LINOLEIC ACID PO, 200 mg GLA, 3000 mg Borage oil, 600 mg Linolenic acid, 30 mg Vitamin E, Disp: , Rfl:      Cholecalciferol (VITAMIN D-3) 5000 UNITS TABS, , Disp: , Rfl:      fish oil-omega-3 fatty acids (OMEGA 3) 1000 MG capsule, Take 1,000 mg by mouth daily 3 tablets daily, Disp: , Rfl:      GINSENG PO, , Disp: , Rfl:      HERBALS, Pseudo Ginseng, Disp: , Rfl:      HERBALS, Eclipta, Disp: , Rfl:      HERBALS, cordyalis, Disp: , Rfl:       HERBALS, atractylodes, Disp: , Rfl:      HERBALS, White peony, Disp: , Rfl:      HERBALS, Eliazar maohing - immune support, Disp: , Rfl:      HERBALS, advaclan - motagenisis, Disp: , Rfl:      HERBALS, Crave-control, Disp: , Rfl:      HERBALS, Bifidus/acidophilus 900mg takes 3 caps daily 2 in  Am 1 in pm. metagenics estrofactors multivitamin for women daily nutri -dyn dynamic fruits and greens antioxidant powder daily Ortho molecular- ortho digestyzme daily. Martina Riley, Disp: , Rfl:      HERBALS, cynthia di, Disp: , Rfl:      HERBALS, Henriquez marbin, Disp: , Rfl:      HERBALS, Henriquez qi, Disp: , Rfl:      HERBALS, salvatore king, Disp: , Rfl:      HERBALS, X coleen, Disp: , Rfl:      HERBALS, naldo miles, Disp: , Rfl:      HERBALS, pu luang, Disp: , Rfl:      HERBALS, atractylodes, Disp: , Rfl:      HERBALS, Poria, Disp: , Rfl:      HERBALS, rhemania, Disp: , Rfl:      HERBALS, Peony, Disp: , Rfl:      HERBALS, Marcia, Disp: , Rfl:      HERBALS, astragulus, Disp: , Rfl:      HERBALS, cordyceps, Disp: , Rfl:      L-GLUTAMINE PO, Take 3,000 mg by mouth 1/2 tsp in am and pm, Disp: , Rfl:      LICORICE, , Disp: , Rfl:      MAGNESIUM GLYCINATE, 100 mg qd, Disp: , Rfl:      NEW MED, Ortho Digestyme: blend of pancreatin, pepsin, bromelain, papain, betaine, taken as directed, Disp: , Rfl:      ALLERGIES:    Allergies   Allergen Reactions     Penicillins Hives     Sulfa Drugs Hives        SOCIAL HISTORY:   Social History     Social History     Marital status:      Spouse name: N/A     Number of children: N/A     Years of education: N/A     Occupational History     Not on file.     Social History Main Topics     Smoking status: Never Smoker     Smokeless tobacco: Never Used     Alcohol use No     Drug use: No     Sexual activity: No     Other Topics Concern     Parent/Sibling W/ Cabg, Mi Or Angioplasty Before 65f 55m? Yes     Brother 46 and Father 37     Social History Narrative        FAMILY HISTORY:   Family History   Problem  "Relation Age of Onset     Cerebrovascular Disease Mother      Hypertension Father      Cancer Father      bladder cancer,  liver cancer     HEART DISEASE Father      HEART DISEASE Brother 46     MI     Breast Cancer No family hx of         EXAM:Vitals: /74 (BP Location: Left arm, Patient Position: Sitting, Cuff Size: Adult Regular)  Pulse 98  Ht 5' 3\" (1.6 m)  Wt 164 lb (74.4 kg)  LMP 08/23/2018 (Approximate)  BMI 29.05 kg/m2  BMI= Body mass index is 29.05 kg/(m^2).  Weight management plan: Patient was referred to their PCP to discuss a diet and exercise plan.    General appearance: Patient is alert and fully cooperative with history & exam.  No sign of distress is noted during the visit.     Psychiatric: Affect is pleasant & appropriate.  Patient appears motivated to improve health.     Respiratory: Breathing is regular & unlabored while sitting.     HEENT: Hearing is intact to spoken word.  Speech is clear.  No gross evidence of visual impairment that would impact ambulation.     Dermatologic: Skin is intact to both lower extremities without significant lesions, rash or abrasion.  Noted paronychia.     Vascular: DP & PT pulses are intact & regular bilaterally.  No significant edema or varicosities noted.  CFT and skin temperature is normal to both lower extremities.     Neurologic: Lower extremity sensation is intact to light touch.  No evidence of weakness or contracture in the lower extremities.  No evidence of neuropathy.     Musculoskeletal: Patient is ambulatory without assistive device or brace.  No gross ankle deformity noted.  No foot or ankle joint effusion is noted.    One notes an inflamed medial nail border of the left great toe.  There is noted erythema and edema located around the medial labial fold and does not extend past the interphalangeal joint.  There is no apparent purulent drainage noted.  There is pain on palpation to the proximal aspect of the medial nail border.      Assessment: "  1.  Paranychia to the medial aspect of the left great toe.      Plan:  I have explained to Sangita about the condition.  The potential causes and nature of an ingrown toenail were discussed with the patient.  We reviewed the natural history/prognosis of the condition and potential risks if no treatment is provided.      Treatment options discussed included conservative management (oral antibiotics, soaking of foot, adequate width shoes)  as well as surgical management (partial or total nail removal).  The pros and cons of both forms of treatment were reviewed.      After thorough discussion and answering all questions, the patient elected to proceed with surgery.    At this point, I am recommending surgical treatment of the condition involving total surgical nail matrixectomy of the great toe on the left foot.  I informed the patient in risks and benefits of the procedure including but not limited to infection, wound complications, swelling, pain, diminished range of motion and function, DVT and reoccurrence of condition.  The procedure will be performed under  local.  The patient will obtain a preoperative history and physical by the primary care provider.  Consents will be reviewed and signed on the day of surgery.      Disclaimer: This note consists of symbols derived from keyboarding, dictation and/or voice recognition software. As a result, there may be errors in the script that have gone undetected. Please consider this when interpreting information found in this chart.      THAI Rogers D.P.M., F.[place].C.F.A.S.

## 2018-09-20 NOTE — LETTER
9/20/2018         RE: Sangita Montano  [address]  [place] [address]        Dear Colleague,    Thank you for referring your patient, Sangita Montano, to the [facility]. Please see a copy of my visit note below.    Sangita Montano is a 53 year old female who presents with a chief complain of a painful ingrown toenail to the left great toe.  The patient relates the ingrown nail was first noticed several weeks ago and has steadily become worse.  The patient relates the medial nail border is inflamed and sore to the touch.  The patient relates no bloody drainage.  The patient denies any redness extending up the big toe.  The patient has been treating the big toe by soaking it in salt water and antibiotics with no relief.       REVIEW OF SYSTEMS:  Constitutional, HEENT, cardiovascular, pulmonary, GI, , musculoskeletal, neuro, skin, endocrine and psych systems are negative, except as otherwise noted.     PAST MEDICAL HISTORY:   Past Medical History:   Diagnosis Date     Bladder cancer (H)     7/2013        PAST SURGICAL HISTORY:   Past Surgical History:   Procedure Laterality Date     COLONOSCOPY N/A 3/2/2016    Procedure: COLONOSCOPY;  Surgeon: Bala Pineda MD;  Location: [facility]     CYSTOSCOPY, TRANSURETHRAL RESECTION (TUR) PROSTATE, COMBINED  7/3/2013    Procedure: COMBINED CYSTOSCOPY, TRANSURETHRAL RESECTION (TUR) PROSTATE;  Transurethral Resection of Bladder Cancer;  Surgeon: DONG Wright MD;  Location: [facility]     EYE SURGERY          MEDICATIONS:   Current Outpatient Prescriptions:      ascorbic acid (VITAMIN C) 500 MG tablet, Take by mouth daily 6674-6734 mg daily, Disp: , Rfl:      BIFIDOBACTERIUM BIFIDUS, 300 mg 2 in am and 1 in pm, Disp: , Rfl:      BORAGE OIL-GLA-LINOLEIC ACID PO, 200 mg GLA, 3000 mg Borage oil, 600 mg Linolenic acid, 30 mg Vitamin E, Disp: , Rfl:      Cholecalciferol (VITAMIN D-3) 5000 UNITS TABS, , Disp: , Rfl:       fish oil-omega-3 fatty acids (OMEGA 3) 1000 MG capsule, Take 1,000 mg by mouth daily 3 tablets daily, Disp: , Rfl:      GINSENG PO, , Disp: , Rfl:      HERBALS, Pseudo Ginseng, Disp: , Rfl:      HERBALS, Eclipta, Disp: , Rfl:      HERBALS, cordyalis, Disp: , Rfl:      HERBALS, atractylodes, Disp: , Rfl:      HERBALS, White peony, Disp: , Rfl:      HERBALS, Eliazar maohing - immune support, Disp: , Rfl:      HERBALS, advaclan - motagenisis, Disp: , Rfl:      HERBALS, Crave-control, Disp: , Rfl:      HERBALS, Bifidus/acidophilus 900mg takes 3 caps daily 2 in  Am 1 in pm. metagenics estrofactors multivitamin for women daily nutri -dyn dynamic fruits and greens antioxidant powder daily Ortho molecular- ortho digestyzme daily. Martina Riley, Disp: , Rfl:      HERBALS, cynthia di, Disp: , Rfl:      HERBALS, Henriquez marbin, Disp: , Rfl:      HERBALS, Henriquez qi, Disp: , Rfl:      HERBALS, salvatore king, Disp: , Rfl:      HERBALS, X coleen, Disp: , Rfl:      HERBALS, sha jd, Disp: , Rfl:      HERBALS, pu luang, Disp: , Rfl:      HERBALS, atractylodes, Disp: , Rfl:      HERBALS, Poria, Disp: , Rfl:      HERBALS, rhemania, Disp: , Rfl:      HERBALS, Peony, Disp: , Rfl:      HERBALS, Marcia, Disp: , Rfl:      HERBALS, astragulus, Disp: , Rfl:      HERBALS, cordyceps, Disp: , Rfl:      L-GLUTAMINE PO, Take 3,000 mg by mouth 1/2 tsp in am and pm, Disp: , Rfl:      LICORICE, , Disp: , Rfl:      MAGNESIUM GLYCINATE, 100 mg qd, Disp: , Rfl:      NEW MED, Ortho Digestyme: blend of pancreatin, pepsin, bromelain, papain, betaine, taken as directed, Disp: , Rfl:      ALLERGIES:    Allergies   Allergen Reactions     Penicillins Hives     Sulfa Drugs Hives        SOCIAL HISTORY:   Social History     Social History     Marital status:      Spouse name: N/A     Number of children: N/A     Years of education: N/A     Occupational History     Not on file.     Social History Main Topics     Smoking status: Never Smoker     Smokeless tobacco:  "Never Used     Alcohol use No     Drug use: No     Sexual activity: No     Other Topics Concern     Parent/Sibling W/ Cabg, Mi Or Angioplasty Before 65f 55m? Yes     Brother 46 and Father 37     Social History Narrative        FAMILY HISTORY:   Family History   Problem Relation Age of Onset     Cerebrovascular Disease Mother      Hypertension Father      Cancer Father      bladder cancer,  liver cancer     HEART DISEASE Father      HEART DISEASE Brother 46     MI     Breast Cancer No family hx of         EXAM:Vitals: /74 (BP Location: Left arm, Patient Position: Sitting, Cuff Size: Adult Regular)  Pulse 98  Ht 5' 3\" (1.6 m)  Wt 164 lb (74.4 kg)  LMP 08/23/2018 (Approximate)  BMI 29.05 kg/m2  BMI= Body mass index is 29.05 kg/(m^2).  Weight management plan: Patient was referred to their PCP to discuss a diet and exercise plan.    General appearance: Patient is alert and fully cooperative with history & exam.  No sign of distress is noted during the visit.     Psychiatric: Affect is pleasant & appropriate.  Patient appears motivated to improve health.     Respiratory: Breathing is regular & unlabored while sitting.     HEENT: Hearing is intact to spoken word.  Speech is clear.  No gross evidence of visual impairment that would impact ambulation.     Dermatologic: Skin is intact to both lower extremities without significant lesions, rash or abrasion.  Noted paronychia.     Vascular: DP & PT pulses are intact & regular bilaterally.  No significant edema or varicosities noted.  CFT and skin temperature is normal to both lower extremities.     Neurologic: Lower extremity sensation is intact to light touch.  No evidence of weakness or contracture in the lower extremities.  No evidence of neuropathy.     Musculoskeletal: Patient is ambulatory without assistive device or brace.  No gross ankle deformity noted.  No foot or ankle joint effusion is noted.    One notes an inflamed medial nail border of the left great " toe.  There is noted erythema and edema located around the medial labial fold and does not extend past the interphalangeal joint.  There is no apparent purulent drainage noted.  There is pain on palpation to the proximal aspect of the medial nail border.      Assessment:  1.  Paranychia to the medial aspect of the left great toe.      Plan:  I have explained to Sangita about the condition.  The potential causes and nature of an ingrown toenail were discussed with the patient.  We reviewed the natural history/prognosis of the condition and potential risks if no treatment is provided.      Treatment options discussed included conservative management (oral antibiotics, soaking of foot, adequate width shoes)  as well as surgical management (partial or total nail removal).  The pros and cons of both forms of treatment were reviewed.      After thorough discussion and answering all questions, the patient elected to proceed with surgery.    At this point, I am recommending surgical treatment of the condition involving total surgical nail matrixectomy of the great toe on the left foot.  I informed the patient in risks and benefits of the procedure including but not limited to infection, wound complications, swelling, pain, diminished range of motion and function, DVT and reoccurrence of condition.  The procedure will be performed under  local.  The patient will obtain a preoperative history and physical by the primary care provider.  Consents will be reviewed and signed on the day of surgery.      Disclaimer: This note consists of symbols derived from keyboarding, dictation and/or voice recognition software. As a result, there may be errors in the script that have gone undetected. Please consider this when interpreting information found in this chart.      GAYLE Abbasi.P.M., F.A.C.F.A.S.        Again, thank you for allowing me to participate in the care of your patient.        Sincerely,        Sagar Rogers,  JOHN

## 2018-09-20 NOTE — PATIENT INSTRUCTIONS
You have elected to proceed with Surgery for total surgical nail matrixectomy left great toe  Surgeries are performed on Tuesdays at [facility].   To schedule your surgery date please call [phone].  Please leave a message with a good time for our staff to call you back.    - Please have a date in mind for your surgery, you can feel free to leave that date on the message, and we will schedule and call back to confirm.     You can expect receive a call back the same day or on the next business day from Dr. Rogers s team to assist in the scheduling.   - We will schedule the date of your surgery.  The time will be determined a few days ahead of time.  You can expect a call from Same Day Surgery 2-3 days ahead of time with specific instructions for what time to arrive at the hospital as well as any other preparations you should take prior to surgery.    - You may need to obtain a pre-operative physical from your primary medical provider. This must be done within 30 days of your surgery date.    - We will also schedule your first post-operative appointment for a bandage and wound check for the Monday following your surgery at the [facility].    - You may be non-weight bearing for a period of up to 6 weeks.  Options for this include: (Please indicate which you would prefer so we can provide you with an order and instructions)  o Crutches  o Walker  o Roll-a-bout knee walker.    - If you will need paperwork filled out for your employer you may drop those off at the clinic directly or you may have those faxed to us at [phone].  Please indicate on the form the date you would like the LA to begin if it will not be your surgery date.    The forms are typically filled out for up to 12 weeks, however you may be cleared to return prior to that time depending on your individual healing and job requirements.

## 2018-11-23 ENCOUNTER — OFFICE VISIT (OUTPATIENT)
Dept: FAMILY MEDICINE | Facility: CLINIC | Age: 53
End: 2018-11-23
Payer: COMMERCIAL

## 2018-11-23 VITALS
BODY MASS INDEX: 27.28 KG/M2 | SYSTOLIC BLOOD PRESSURE: 108 MMHG | TEMPERATURE: 99.1 F | HEART RATE: 96 BPM | RESPIRATION RATE: 14 BRPM | DIASTOLIC BLOOD PRESSURE: 62 MMHG | WEIGHT: 154 LBS

## 2018-11-23 DIAGNOSIS — L30.9 ACUTE DERMATITIS: Primary | ICD-10-CM

## 2018-11-23 PROCEDURE — 99214 OFFICE O/P EST MOD 30 MIN: CPT

## 2018-11-23 RX ORDER — PREDNISONE 20 MG/1
TABLET ORAL
Qty: 20 TABLET | Refills: 0 | Status: SHIPPED | OUTPATIENT
Start: 2018-11-23 | End: 2019-01-08

## 2018-11-23 RX ORDER — TRIAMCINOLONE ACETONIDE 1 MG/G
CREAM TOPICAL 2 TIMES DAILY
Qty: 80 G | Refills: 1 | Status: SHIPPED | OUTPATIENT
Start: 2018-11-23 | End: 2019-05-07

## 2018-11-23 RX ORDER — PREDNISONE 20 MG/1
TABLET ORAL
Qty: 20 TABLET | Refills: 0 | Status: SHIPPED | OUTPATIENT
Start: 2018-11-23 | End: 2018-11-23

## 2018-11-23 NOTE — PATIENT INSTRUCTIONS
"  Start prednisone in am and taper down dose as instructed    Start triamcinolone creme 2 times a day to rash    If on face use over the counter hydrocortisone cream    Start benadryl at night 25 mg capsule at bedtime    If rash is not getting on the steroid, start claritin 10 mg a day until rash    \Skin Dermatitis (Rash) Adult Description  The word \"rash\" means an outbreak of red bumps on the body. The way people use this term, \"a rash\" can refer to many different skin conditions.   A rash is an abnormal change in skin color or texture. Rashes usually result from skin irritation, which can have many causes.  Symptoms  Rashes can have different appearances. A general description is raised, red, itchy, and sometimes scaly areas that may vary in size and shape.   Causes  There are many possible causes for a rash. They include:  viruses   chickenpox   measles ([place])   [place] measles   Fifth disease   roseola   bacterial infections like impetigo and scarlet fever   Lyme disease, [place] spotted fever   heat rash (prickly heat)   contact with something you are allergic to, such as medicines like penicillin   soaps and detergents   scabies   ringworm   germs in hot tubs   swimmer s itch   What You Should Do At Home (Follow-up Care)   Keep your skin moisturized with lotion. Apply it several times a day if needed.   You can try taking a cool bath or shower (not hot because it could make you itch more) or apply calamine lotion.   You may also try a commercial product, such as Aveeno  Colloidal Oatmeal bath.   Do not scrub your skin. Do not rub vigorously when drying. Pat your skin dry.   Use as little soap as possible. Use a gentle cleanser such as Basis  or Dove .   Apply a cool damp washcloth to skin areas that are itchy.   Avoid heat or rubbing, which can make you itch more.   Avoid strenuous exercise or activity. This often makes itching worse.   Over-the-counter hydrocortisone cream can be applied to small " areas.   Over-the-counter antihistamines such as diphenhydramine taken by mouth may help decrease itching.   If the itching is severe or not responding to the above treatments, your provider may prescribe an oral steroid medicine (for example, prednisone).   Keep your fingernails cut short so that you do not scratch yourself in your sleep.   Wear loose cotton clothing or other natural fibers.   Don t put cosmetics (makeup) on a rash.   Please keep all medicines out of the reach of children.   What You Can Do To Stay Healthy   Try not to change soaps or detergents that you know you have no reaction to. If you do need to change soaps or detergents, check your skin for any reaction.   Always monitor yourself closely after starting any new medicine or new food.   Care Alerts  Call 911 if:   You start to have trouble breathing, trouble swallowing, or feel tightness in your throat or chest.   You have trouble breathing, lightheadedness, nausea, or a cold sweat.   Call your healthcare provider right away or return to the emergency department if:   Your rash is getting worse.   You feel weak, dizzy, or lightheaded.   Your skin is becoming redder or more painful.   You have open sores from scratching that have red streaks from the wound going toward your heart.   The area feels very warm when you touch it.   You start to have pus or other fluid coming from the area.   You have a fever higher than 101.5  F (38.6  C) orally.   You start to have chills, nausea, vomiting, or muscle aches.   You have a question about whether your rash needs to be treated.   You have any symptoms that worry you.

## 2018-11-23 NOTE — NURSING NOTE
"Chief Complaint   Patient presents with     Derm Problem       Initial /62 (BP Location: Right arm, Cuff Size: Adult Regular)  Pulse 96  Temp 99.1  F (37.3  C) (Tympanic)  Resp 14  Wt 154 lb (69.9 kg)  BMI 27.28 kg/m2 Estimated body mass index is 27.28 kg/(m^2) as calculated from the following:    Height as of 9/20/18: 5' 3\" (1.6 m).    Weight as of this encounter: 154 lb (69.9 kg).    Patient presents to the clinic using No DME    Health Maintenance that is potentially due pending provider review:  NONE    n/a    Is there anyone who you would like to be able to receive your results? Not Applicable  If yes have patient fill out SHANA  Sobia Bautista M.A.          "

## 2018-11-23 NOTE — PROGRESS NOTES
SUBJECTIVE:   Sangita Montano is a 53 year old female who presents to clinic today for the following health issues:    Rash  Onset: started Wednesday morning     Description:   Location: started on back of neck, went to arms, chest and legs  Character: raised, burning, red  Itching (Pruritis): YES    Progression of Symptoms:  worsening    Accompanying Signs & Symptoms:  Fever: no   Body aches or joint pain: no   Sore throat symptoms: no   Recent cold symptoms: no     History:   Previous similar rash: no     Precipitating factors:   Exposure to similar rash: no   New exposures: None   Recent travel: no     Alleviating factors:  None    no joint pain, no fever, no h/o rash in past, no headaches    No sob whezing     Therapies Tried and outcome: Apple cider vinegar, calamine lotion, paroxmine.         Problem list and histories reviewed & adjusted, as indicated.  Additional history: as documented    Patient Active Problem List   Diagnosis     Senile sebaceous gland hyperplasia     Lentigo     Melanocytic nevus     Neoplasm of uncertain behavior of skin     Abdominal pain     Bladder cancer (H)     CARDIOVASCULAR SCREENING; LDL GOAL LESS THAN 160     Past Surgical History:   Procedure Laterality Date     COLONOSCOPY N/A 3/2/2016    Procedure: COLONOSCOPY;  Surgeon: Bala Pineda MD;  Location: [facility]     CYSTOSCOPY, TRANSURETHRAL RESECTION (TUR) PROSTATE, COMBINED  7/3/2013    Procedure: COMBINED CYSTOSCOPY, TRANSURETHRAL RESECTION (TUR) PROSTATE;  Transurethral Resection of Bladder Cancer;  Surgeon: DONG Wright MD;  Location: [facility]     EYE SURGERY         Social History   Substance Use Topics     Smoking status: Never Smoker     Smokeless tobacco: Never Used     Alcohol use No     Family History   Problem Relation Age of Onset     Cerebrovascular Disease Mother      Hypertension Father      Cancer Father      bladder cancer,  liver cancer     HEART DISEASE Father      HEART DISEASE Brother 46      MI     Breast Cancer No family hx of          Current Outpatient Prescriptions   Medication Sig Dispense Refill     ascorbic acid (VITAMIN C) 500 MG tablet Take by mouth daily 1827-9016 mg daily       BIFIDOBACTERIUM BIFIDUS 300 mg 2 in am and 1 in pm       BORAGE OIL-GLA-LINOLEIC ACID  mg GLA, 3000 mg Borage oil, 600 mg Linolenic acid, 30 mg Vitamin E       Cholecalciferol (VITAMIN D-3) 5000 UNITS TABS        fish oil-omega-3 fatty acids (OMEGA 3) 1000 MG capsule Take 1,000 mg by mouth daily 3 tablets daily       predniSONE (DELTASONE) 20 MG tablet Take 3 tabs (60 mg) by mouth daily x 3 days, 2 tabs (40 mg) daily x 3 days, 1 tab (20 mg) daily x 3 days, then 1/2 tab (10 mg) x 3 days. 20 tablet 0     triamcinolone (KENALOG) 0.1 % cream Apply topically 2 times daily 80 g 1     MAGNESIUM GLYCINATE 100 mg qd       NEW MED Ortho Digestyme: blend of pancreatin, pepsin, bromelain, papain, betaine, taken as directed (Patient not taking: Reported on 11/23/2018)       Allergies   Allergen Reactions     Penicillins Hives     Sulfa Drugs Hives     Recent Labs   Lab Test  09/06/18   1709  01/22/16   1642  12/26/14   1356  07/29/14   1700  10/07/13   0719   07/01/13   1008   LDL   --    --    --    --   132*   --    --    HDL   --    --    --    --   50   --    --    TRIG   --    --    --    --   129   --    --    ALT  34   --    --   27   --    --   30   CR   --    --    --   0.95   --    --   0.80   GFRESTIMATED   --    --    --   62   --    --   76   GFRESTBLACK   --    --    --   75   --    --   >90   POTASSIUM   --    --    --   5.3   --    --   4.1   TSH   --   1.46  0.88   --   2.55   < >   --     < > = values in this interval not displayed.      BP Readings from Last 3 Encounters:   11/23/18 108/62   09/20/18 127/74   09/17/18 120/62    Wt Readings from Last 3 Encounters:   11/23/18 154 lb (69.9 kg)   09/20/18 164 lb (74.4 kg)   09/17/18 164 lb (74.4 kg)                  Labs reviewed in EPIC    Reviewed and updated  as needed this visit by clinical staff       Reviewed and updated as needed this visit by Provider         ROS:  Constitutional, HEENT, cardiovascular, pulmonary, gi and gu systems are negative, except as otherwise noted.    OBJECTIVE:     /62 (BP Location: Right arm, Cuff Size: Adult Regular)  Pulse 96  Temp 99.1  F (37.3  C) (Tympanic)  Resp 14  Wt 154 lb (69.9 kg)  BMI 27.28 kg/m2  Body mass index is 27.28 kg/(m^2).   GENERAL: healthy, alert and no distress  NECK: no adenopathy, no asymmetry, masses, or scars and thyroid normal to palpation  RESP: lungs clear to auscultation - no rales, rhonchi or wheezes  CV: regular rate and rhythm, normal S1 S2, no S3 or S4, no murmur, click or rub, no peripheral edema and peripheral pulses strong  MS: no gross musculoskeletal defects noted, no edema  SKIN:  Red/maculpapular rash, in large confluent  patches on on trunk and extremities and lower portion of anterior neck, mild excoriated areas, no vesicular lesions, no urticarial lesions  NEURO: Normal strength and tone, mentation intact and speech normal    Diagnostic Test Results:  none     ASSESSMENT/PLAN:     Problem List Items Addressed This Visit     None      Visit Diagnoses     Acute dermatitis    -  Primary    Relevant Medications    triamcinolone (KENALOG) 0.1 % cream    predniSONE (DELTASONE) 20 MG tablet           ASSESSMENT/PLAN:      ICD-10-CM    1. Acute dermatitis L30.9 triamcinolone (KENALOG) 0.1 % cream     predniSONE (DELTASONE) 20 MG tablet     DISCONTINUED: predniSONE (DELTASONE) 20 MG tablet       Patient Instructions     Start prednisone in am and taper down dose as instructed    Start triamcinolone creme 2 times a day to rash    If on face use over the counter hydrocortisone cream    Start benadryl at night 25 mg capsule at bedtime    If rash is not getting better on the steroid, start claritin 10 mg a day until rash resolves    If rash continues to spread, return to clinic, if shortness of  "breath/fever to ER     \Skin Dermatitis (Rash) Adult Description  The word \"rash\" means an outbreak of red bumps on the body. The way people use this term, \"a rash\" can refer to many different skin conditions.   A rash is an abnormal change in skin color or texture. Rashes usually result from skin irritation, which can have many causes.  Symptoms  Rashes can have different appearances. A general description is raised, red, itchy, and sometimes scaly areas that may vary in size and shape.   Causes  There are many possible causes for a rash. They include:  viruses   chickenpox   measles ([place])   [place]uneian measles   Fifth disease   roseola   bacterial infections like impetigo and scarlet fever   Lyme disease, Sridhar Mountain spotted fever   heat rash (prickly heat)   contact with something you are allergic to, such as medicines like penicillin   soaps and detergents   scabies   ringworm   germs in hot tubs   swimmer s itch   What You Should Do At Home (Follow-up Care)   Keep your skin moisturized with lotion. Apply it several times a day if needed.   You can try taking a cool bath or shower (not hot because it could make you itch more) or apply calamine lotion.   You may also try a commercial product, such as Aveeno  Colloidal Oatmeal bath.   Do not scrub your skin. Do not rub vigorously when drying. Pat your skin dry.   Use as little soap as possible. Use a gentle cleanser such as Basis  or Dove .   Apply a cool damp washcloth to skin areas that are itchy.   Avoid heat or rubbing, which can make you itch more.   Avoid strenuous exercise or activity. This often makes itching worse.   Over-the-counter hydrocortisone cream can be applied to small areas.   Over-the-counter antihistamines such as diphenhydramine taken by mouth may help decrease itching.   If the itching is severe or not responding to the above treatments, your provider may prescribe an oral steroid medicine (for example, prednisone).   Keep your fingernails " cut short so that you do not scratch yourself in your sleep.   Wear loose cotton clothing or other natural fibers.   Don t put cosmetics (makeup) on a rash.   Please keep all medicines out of the reach of children.   What You Can Do To Stay Healthy   Try not to change soaps or detergents that you know you have no reaction to. If you do need to change soaps or detergents, check your skin for any reaction.   Always monitor yourself closely after starting any new medicine or new food.   Care Alerts  Call 911 if:   You start to have trouble breathing, trouble swallowing, or feel tightness in your throat or chest.   You have trouble breathing, lightheadedness, nausea, or a cold sweat.   Call your healthcare provider right away or return to the emergency department if:   Your rash is getting worse.   You feel weak, dizzy, or lightheaded.   Your skin is becoming redder or more painful.   You have open sores from scratching that have red streaks from the wound going toward your heart.   The area feels very warm when you touch it.   You start to have pus or other fluid coming from the area.   You have a fever higher than 101.5  F (38.6  C) orally.   You start to have chills, nausea, vomiting, or muscle aches.   You have a question about whether your rash needs to be treated.   You have any symptoms that worry you.           RENALDO SAME DAY PROVIDER  [facility]

## 2018-12-14 ENCOUNTER — HOSPITAL ENCOUNTER (OUTPATIENT)
Dept: MAMMOGRAPHY | Facility: CLINIC | Age: 53
Discharge: HOME | End: 2018-12-14
Attending: OBSTETRICS & GYNECOLOGY | Admitting: OBSTETRICS & GYNECOLOGY
Payer: COMMERCIAL

## 2018-12-14 DIAGNOSIS — Z12.31 VISIT FOR SCREENING MAMMOGRAM: ICD-10-CM

## 2018-12-14 PROCEDURE — 77063 BREAST TOMOSYNTHESIS BI: CPT

## 2019-01-08 ENCOUNTER — OFFICE VISIT (OUTPATIENT)
Dept: UROLOGY | Facility: CLINIC | Age: 54
End: 2019-01-08
Payer: COMMERCIAL

## 2019-01-08 VITALS
BODY MASS INDEX: 28.35 KG/M2 | WEIGHT: 160 LBS | HEIGHT: 63 IN | TEMPERATURE: 99.3 F | SYSTOLIC BLOOD PRESSURE: 119 MMHG | DIASTOLIC BLOOD PRESSURE: 77 MMHG | HEART RATE: 92 BPM

## 2019-01-08 DIAGNOSIS — Z85.51 PERSONAL HISTORY OF MALIGNANT NEOPLASM OF BLADDER: Primary | ICD-10-CM

## 2019-01-08 PROCEDURE — 88112 CYTOPATH CELL ENHANCE TECH: CPT | Performed by: UROLOGY

## 2019-01-08 PROCEDURE — 52000 CYSTOURETHROSCOPY: CPT | Performed by: UROLOGY

## 2019-01-08 ASSESSMENT — MIFFLIN-ST. JEOR: SCORE: 1294.89

## 2019-01-08 NOTE — PROGRESS NOTES
Appointment source: Established Patient  Patient name: Sangita Montano  Urology Staff: Regis Wright MD    Subjective: This is a 53 year old year old female returning for follow up of low grade bladder cancer.    Denies any gross hematuria.    Objective:  Cystoscopy was without evidence of recurrence of bladder cancer.    Plan:  Return in 4 months for repeat cystoscopy

## 2019-01-08 NOTE — NURSING NOTE
Patient brought back to the procedure room for a cystoscopy per Dr. Wright Informed consent obtained.  Patient prepped in a sterile manner and a uro jet was used.      Scope serial number: [id number] Olympus    Fredy K, CMA

## 2019-01-08 NOTE — NURSING NOTE
"Initial /77 (BP Location: Left arm, Patient Position: Sitting, Cuff Size: Adult Regular)   Pulse 92   Temp 99.3  F (37.4  C) (Tympanic)   Ht 1.6 m (5' 3\")   Wt 72.6 kg (160 lb)   BMI 28.34 kg/m   Estimated body mass index is 28.34 kg/m  as calculated from the following:    Height as of this encounter: 1.6 m (5' 3\").    Weight as of this encounter: 72.6 kg (160 lb). .    Fredy LR CMA    "

## 2019-01-09 LAB — COPATH REPORT: NORMAL

## 2019-04-19 ENCOUNTER — HEALTH MAINTENANCE LETTER (OUTPATIENT)
Age: 54
End: 2019-04-19

## 2019-05-07 ENCOUNTER — OFFICE VISIT (OUTPATIENT)
Dept: UROLOGY | Facility: CLINIC | Age: 54
End: 2019-05-07
Payer: COMMERCIAL

## 2019-05-07 VITALS
TEMPERATURE: 99 F | HEART RATE: 96 BPM | HEIGHT: 63 IN | WEIGHT: 160 LBS | SYSTOLIC BLOOD PRESSURE: 126 MMHG | BODY MASS INDEX: 28.35 KG/M2 | DIASTOLIC BLOOD PRESSURE: 77 MMHG

## 2019-05-07 DIAGNOSIS — C67.2 MALIGNANT NEOPLASM OF LATERAL WALL OF URINARY BLADDER (H): Primary | ICD-10-CM

## 2019-05-07 PROCEDURE — 88112 CYTOPATH CELL ENHANCE TECH: CPT | Performed by: UROLOGY

## 2019-05-07 PROCEDURE — 52000 CYSTOURETHROSCOPY: CPT | Performed by: UROLOGY

## 2019-05-07 ASSESSMENT — MIFFLIN-ST. JEOR: SCORE: 1289.89

## 2019-05-07 NOTE — PROGRESS NOTES
Appointment source: Established Patient  Patient name: Sangita Montano  Urology Staff: Regis Wright MD    Subjective: This is a 54 year old year old female returning for follow up of low grade bladder cancer.    Objective:  Cystoscopy was without evidence of recurrent bladder cancer.    Plan:  Return in 4 months for repeat cystoscopy.

## 2019-05-07 NOTE — PROGRESS NOTES
Patient brought back to the procedure room for a cystoscopy per Dr. Wright Informed consent obtained.  Patient prepped in a sterile manner and a uro jet was used.      Scope serial number: [id number] Cande Morse CMA

## 2019-05-07 NOTE — NURSING NOTE
"Initial /77 (BP Location: Right arm, Patient Position: Sitting, Cuff Size: Adult Regular)   Pulse 96   Temp 99  F (37.2  C) (Oral)   Ht 1.6 m (5' 3\")   Wt 72.6 kg (160 lb)   BMI 28.34 kg/m   Estimated body mass index is 28.34 kg/m  as calculated from the following:    Height as of this encounter: 1.6 m (5' 3\").    Weight as of this encounter: 72.6 kg (160 lb). .    Feli Morse CMA    "

## 2019-05-08 LAB — COPATH REPORT: NORMAL

## 2019-08-14 ENCOUNTER — TELEPHONE (OUTPATIENT)
Dept: FAMILY MEDICINE | Facility: CLINIC | Age: 54
End: 2019-08-14

## 2019-08-14 NOTE — TELEPHONE ENCOUNTER
Panel Management Review      Patient has the following on her problem list: None      Composite cancer screening  Chart review shows that this patient is due/due soon for the following Pap Smear  Summary:    Patient is due/failing the following:   LDL and PAP    Action needed:   Patient needs office visit for pap.    Type of outreach:    Phone, left message for patient to call back.     Questions for provider review:    None                                                                                                                                    Amy Magana MA

## 2019-08-15 NOTE — TELEPHONE ENCOUNTER
Pt states that she does NOT use this clinic or provider and will take care of this at her home clinic.

## 2019-09-10 ENCOUNTER — OFFICE VISIT (OUTPATIENT)
Dept: UROLOGY | Facility: CLINIC | Age: 54
End: 2019-09-10
Payer: COMMERCIAL

## 2019-09-10 VITALS — TEMPERATURE: 98.6 F | HEART RATE: 95 BPM | SYSTOLIC BLOOD PRESSURE: 118 MMHG | DIASTOLIC BLOOD PRESSURE: 75 MMHG

## 2019-09-10 DIAGNOSIS — Z85.51 PERSONAL HISTORY OF MALIGNANT NEOPLASM OF BLADDER: Primary | ICD-10-CM

## 2019-09-10 PROCEDURE — 52000 CYSTOURETHROSCOPY: CPT | Performed by: UROLOGY

## 2019-09-10 PROCEDURE — 88112 CYTOPATH CELL ENHANCE TECH: CPT | Performed by: UROLOGY

## 2019-09-10 NOTE — PATIENT INSTRUCTIONS
Per physician instructions.    If you have questions or concerns on any instructions given to you by your provider today or if you need to schedule an appointment, you can reach us at [phone].    Thank you!

## 2019-09-10 NOTE — NURSING NOTE
"Chief Complaint   Patient presents with     Cystoscopy     History of Bladder Cancer        Initial /75 (BP Location: Left arm, Patient Position: Chair, Cuff Size: Adult Regular)   Pulse 95   Temp 98.6  F (37  C) (Tympanic)  Estimated body mass index is 28.34 kg/m  as calculated from the following:    Height as of 5/7/19: 1.6 m (5' 3\").    Weight as of 5/7/19: 72.6 kg (160 lb).  BP completed using cuff size: regular   Medications and allergies reviewed.      Shahana WILLIAM CMA     "

## 2019-09-10 NOTE — NURSING NOTE
Pt brought back to the procedure room for a cystoscopy per Dr. Wright Informed consent obtained, pt prepped in a sterile manner and a uro jet was used.      Scope serial number: [id number] Olympus       Shahana M, CMA

## 2019-09-10 NOTE — PROGRESS NOTES
Appointment source: Established Patient  Patient name: Sangita Montano  Urology Staff: Regis Wright MD    Subjective: This is a 54 year old year old female returning for follow up of low grade bladder cancer    Objective:  Cystoscopy was without evidence of recurrent bladder cancer.    Plan:  Return in 6 months.

## 2019-09-12 LAB — COPATH REPORT: NORMAL

## 2019-11-03 ENCOUNTER — HEALTH MAINTENANCE LETTER (OUTPATIENT)
Age: 54
End: 2019-11-03

## 2019-11-21 ENCOUNTER — OFFICE VISIT (OUTPATIENT)
Dept: OBGYN | Facility: CLINIC | Age: 54
End: 2019-11-21
Payer: COMMERCIAL

## 2019-11-21 VITALS
HEART RATE: 97 BPM | TEMPERATURE: 98.2 F | WEIGHT: 165 LBS | HEIGHT: 63 IN | DIASTOLIC BLOOD PRESSURE: 78 MMHG | BODY MASS INDEX: 29.23 KG/M2 | RESPIRATION RATE: 18 BRPM | SYSTOLIC BLOOD PRESSURE: 131 MMHG

## 2019-11-21 DIAGNOSIS — Z12.31 ENCOUNTER FOR SCREENING MAMMOGRAM FOR BREAST CANCER: ICD-10-CM

## 2019-11-21 DIAGNOSIS — Z00.00 ROUTINE GENERAL MEDICAL EXAMINATION AT A HEALTH CARE FACILITY: Primary | ICD-10-CM

## 2019-11-21 DIAGNOSIS — N93.9 ABNORMAL UTERINE BLEEDING (AUB): ICD-10-CM

## 2019-11-21 DIAGNOSIS — Z12.4 SCREENING FOR MALIGNANT NEOPLASM OF CERVIX: ICD-10-CM

## 2019-11-21 DIAGNOSIS — Z13.6 CARDIOVASCULAR SCREENING; LDL GOAL LESS THAN 130: ICD-10-CM

## 2019-11-21 PROCEDURE — 58100 BIOPSY OF UTERUS LINING: CPT | Performed by: OBSTETRICS & GYNECOLOGY

## 2019-11-21 PROCEDURE — G0145 SCR C/V CYTO,THINLAYER,RESCR: HCPCS | Performed by: OBSTETRICS & GYNECOLOGY

## 2019-11-21 PROCEDURE — 87624 HPV HI-RISK TYP POOLED RSLT: CPT | Performed by: OBSTETRICS & GYNECOLOGY

## 2019-11-21 PROCEDURE — 99386 PREV VISIT NEW AGE 40-64: CPT | Mod: 25 | Performed by: OBSTETRICS & GYNECOLOGY

## 2019-11-21 PROCEDURE — 88305 TISSUE EXAM BY PATHOLOGIST: CPT | Performed by: OBSTETRICS & GYNECOLOGY

## 2019-11-21 RX ORDER — MULTIVITAMIN,THERAPEUTIC
1 TABLET ORAL DAILY
COMMUNITY

## 2019-11-21 ASSESSMENT — MIFFLIN-ST. JEOR: SCORE: 1312.57

## 2019-11-21 NOTE — NURSING NOTE
"Initial /78 (BP Location: Right arm, Patient Position: Chair, Cuff Size: Adult Large)   Pulse 97   Temp 98.2  F (36.8  C) (Tympanic)   Resp 18   Ht 1.6 m (5' 3\")   Wt 74.8 kg (165 lb)   LMP 10/28/2019   BMI 29.23 kg/m   Estimated body mass index is 29.23 kg/m  as calculated from the following:    Height as of this encounter: 1.6 m (5' 3\").    Weight as of this encounter: 74.8 kg (165 lb). .      "

## 2019-11-21 NOTE — PROGRESS NOTES
" SUBJECTIVE:   CC: Sangita Montano is an 54 year old woman who presents for preventive health visit.   She continues to have very irregular menses, skipped 3 months, then 2 in a month; \"normal\" in volume; she has had menopause symptoms, but have gotten lighter    Healthy Habits:    Do you get at least three servings of calcium containing foods daily (dairy, green leafy vegetables, etc.)? yes    Amount of exercise or daily activities, outside of work: 5 day(s) per week    Problems taking medications regularly No    Medication side effects: No    Have you had an eye exam in the past two years? yes    Do you see a dentist twice per year? yes    Do you have sleep apnea, excessive snoring or daytime drowsiness?no          Today's PHQ-2 Score:   PHQ-2 ( 1999 [employer]) 11/21/2019 11/20/2019   Q1: Little interest or pleasure in doing things 0 0   Q2: Feeling down, depressed or hopeless 0 0   PHQ-2 Score 0 0   Q1: Little interest or pleasure in doing things - Not at all   Q2: Feeling down, depressed or hopeless - Not at all   PHQ-2 Score - 0       Abuse: Current or Past(Physical, Sexual or Emotional)- No  Do you feel safe in your environment? Yes    Have you ever done Advance Care Planning? (For example, a Health Directive, POLST, or a discussion with a medical provider or your loved ones about your wishes): No, advance care planning information given to patient to review.  Patient declined advance care planning discussion at this time.    Social History     Tobacco Use     Smoking status: Never Smoker     Smokeless tobacco: Never Used   Substance Use Topics     Alcohol use: No     If you drink alcohol do you typically have >3 drinks per day or >7 drinks per week? No                     Reviewed orders with patient.  Reviewed health maintenance and updated orders accordingly - Yes  Labs reviewed in EPIC  BP Readings from Last 3 Encounters:   11/21/19 131/78   09/10/19 118/75   05/07/19 126/77    Wt Readings from Last 3 " Encounters:   11/21/19 74.8 kg (165 lb)   05/07/19 72.6 kg (160 lb)   01/08/19 72.6 kg (160 lb)                  Patient Active Problem List   Diagnosis     Neoplasm of uncertain behavior of skin     Bladder cancer (H)     CARDIOVASCULAR SCREENING; LDL GOAL LESS THAN 160     Past Surgical History:   Procedure Laterality Date     COLONOSCOPY N/A 3/2/2016    Procedure: COLONOSCOPY;  Surgeon: Bala Pineda MD;  Location: [facility]     CYSTOSCOPY, TRANSURETHRAL RESECTION (TUR) PROSTATE, COMBINED  7/3/2013    Procedure: COMBINED CYSTOSCOPY, TRANSURETHRAL RESECTION (TUR) PROSTATE;  Transurethral Resection of Bladder Cancer;  Surgeon: DONG Wright MD;  Location: [facility]     EYE SURGERY         Social History     Tobacco Use     Smoking status: Never Smoker     Smokeless tobacco: Never Used   Substance Use Topics     Alcohol use: No     Family History   Problem Relation Age of Onset     Cerebrovascular Disease Mother      Hypertension Father      Cancer Father         bladder cancer,  liver cancer     Heart Disease Father      Heart Disease Brother 46        MI     Breast Cancer No family hx of          Current Outpatient Medications   Medication Sig Dispense Refill     ascorbic acid (VITAMIN C) 500 MG tablet Take by mouth daily 9161-8319 mg daily       BIFIDOBACTERIUM BIFIDUS 300 mg 2 in am and 1 in pm       BORAGE OIL-GLA-LINOLEIC ACID  mg GLA, 3000 mg Borage oil, 600 mg Linolenic acid, 30 mg Vitamin E       Cholecalciferol (VITAMIN D-3) 5000 UNITS TABS        fish oil-omega-3 fatty acids (OMEGA 3) 1000 MG capsule Take 1,000 mg by mouth daily 3 tablets daily       MAGNESIUM GLYCINATE 100 mg qd       multivitamin, therapeutic (THERA-VIT) TABS tablet Take 1 tablet by mouth daily       NEW MED Ortho Digestyme: blend of pancreatin, pepsin, bromelain, papain, betaine, taken as directed       Allergies   Allergen Reactions     Penicillins Hives     Sulfa Drugs Hives       Mammogram Screening: Patient over age 50,  mutual decision to screen reflected in health maintenance.    Pertinent mammograms are reviewed under the imaging tab.  History of abnormal Pap smear: NO - age 30- 65 PAP every 3 years recommended  PAP / HPV 1/22/2016 10/4/2013   PAP NIL NIL     Reviewed and updated as needed this visit by clinical staff         Reviewed and updated as needed this visit by Provider            ROS:  CONSTITUTIONAL: NEGATIVE for fever, chills, change in weight  INTEGUMENTARU/SKIN: NEGATIVE for worrisome rashes, moles or lesions  EYES: NEGATIVE for vision changes or irritation  ENT: NEGATIVE for ear, mouth and throat problems  RESP: NEGATIVE for significant cough or SOB  BREAST: NEGATIVE for masses, tenderness or discharge  CV: NEGATIVE for chest pain, palpitations or peripheral edema  GI: NEGATIVE for nausea, abdominal pain, heartburn, or change in bowel habits   female: c/o abnormal uterine bleeding  MUSCULOSKELETAL: NEGATIVE for significant arthralgias or myalgia  NEURO: NEGATIVE for weakness, dizziness or paresthesias  PSYCHIATRIC: NEGATIVE for changes in mood or affect    OBJECTIVE:   There were no vitals taken for this visit.  EXAM:  GENERAL: healthy, alert and no distress  EYES: Eyes grossly normal to inspection, PERRL and conjunctivae and sclerae normal  HENT: ear canals and TM's normal, nose and mouth without ulcers or lesions  NECK: no adenopathy, no asymmetry, masses, or scars and thyroid normal to palpation  RESP: lungs clear to auscultation - no rales, rhonchi or wheezes  BREAST: normal without masses, tenderness or nipple discharge and no palpable axillary masses or adenopathy  CV: regular rate and rhythm, normal S1 S2, no S3 or S4, no murmur, click or rub, no peripheral edema and peripheral pulses strong  ABDOMEN: soft, nontender, no hepatosplenomegaly, no masses and bowel sounds normal   (female): normal female external genitalia, normal urethral meatus, vaginal mucosa pink, moist, well rugated, and normal  "cervix/adnexa/uterus without masses or discharge; Pap taken  MS: no gross musculoskeletal defects noted, no edema  SKIN: no suspicious lesions or rashes  NEURO: Normal strength and tone, mentation intact and speech normal  PSYCH: mentation appears normal, affect normal/bright    The cervix was visualized with the speculum, and cleansed with an iodine solution.  The anterior cervix was grasped with a tenaculum and a pipelle advanced into the uterine cavity, with a sounded depth of 8 cm.  A representative sample was aspirated from the cavity and sent for pathological examination.      Diagnostic Test Results:  Labs reviewed in Epic    ASSESSMENT/PLAN:       ICD-10-CM    1. Routine general medical examination at a health care facility Z00.00    2. Abnormal uterine bleeding (AUB) N93.9        COUNSELING:   Reviewed preventive health counseling, as reflected in patient instructions  Special attention given to:        evaluation of uterine lining due to late menopause/AUB       Regular exercise       Healthy diet/nutrition       Vision screening    Estimated body mass index is 28.34 kg/m  as calculated from the following:    Height as of 5/7/19: 1.6 m (5' 3\").    Weight as of 5/7/19: 72.6 kg (160 lb).         reports that she has never smoked. She has never used smokeless tobacco.      Counseling Resources:  ATP IV Guidelines  Pooled Cohorts Equation Calculator  Breast Cancer Risk Calculator  FRAX Risk Assessment  ICSI Preventive Guidelines  Dietary Guidelines for Americans, 2010  USDA's MyPlate  ASA Prophylaxis  Lung CA Screening    Annetta Angel MD  [facility]  "

## 2019-11-25 LAB — COPATH REPORT: NORMAL

## 2019-11-26 LAB
COPATH REPORT: NORMAL
PAP: NORMAL

## 2019-11-27 LAB
FINAL DIAGNOSIS: NORMAL
HPV HR 12 DNA CVX QL NAA+PROBE: NEGATIVE
HPV16 DNA SPEC QL NAA+PROBE: NEGATIVE
HPV18 DNA SPEC QL NAA+PROBE: NEGATIVE
SPECIMEN DESCRIPTION: NORMAL
SPECIMEN SOURCE CVX/VAG CYTO: NORMAL

## 2019-12-13 DIAGNOSIS — Z13.6 CARDIOVASCULAR SCREENING; LDL GOAL LESS THAN 130: ICD-10-CM

## 2019-12-13 PROBLEM — E78.5 HYPERLIPIDEMIA WITH TARGET LDL LESS THAN 130: Status: ACTIVE | Noted: 2019-12-13

## 2019-12-13 LAB
CHOLEST SERPL-MCNC: 254 MG/DL
GLUCOSE SERPL-MCNC: 100 MG/DL (ref 70–99)
HDLC SERPL-MCNC: 82 MG/DL
LDLC SERPL CALC-MCNC: 150 MG/DL
NONHDLC SERPL-MCNC: 172 MG/DL
T3FREE SERPL-MCNC: 2.8 PG/ML (ref 2.3–4.2)
TRIGL SERPL-MCNC: 112 MG/DL
TSH SERPL DL<=0.005 MIU/L-ACNC: 1.34 MU/L (ref 0.4–4)

## 2019-12-13 PROCEDURE — 36415 COLL VENOUS BLD VENIPUNCTURE: CPT | Performed by: OBSTETRICS & GYNECOLOGY

## 2019-12-13 PROCEDURE — 84443 ASSAY THYROID STIM HORMONE: CPT | Performed by: OBSTETRICS & GYNECOLOGY

## 2019-12-13 PROCEDURE — 84481 FREE ASSAY (FT-3): CPT | Performed by: OBSTETRICS & GYNECOLOGY

## 2019-12-13 PROCEDURE — 82947 ASSAY GLUCOSE BLOOD QUANT: CPT | Performed by: OBSTETRICS & GYNECOLOGY

## 2019-12-13 PROCEDURE — 80061 LIPID PANEL: CPT | Performed by: OBSTETRICS & GYNECOLOGY

## 2020-02-21 ENCOUNTER — HOSPITAL ENCOUNTER (OUTPATIENT)
Dept: MAMMOGRAPHY | Facility: CLINIC | Age: 55
Discharge: HOME | End: 2020-02-21
Attending: OBSTETRICS & GYNECOLOGY | Admitting: OBSTETRICS & GYNECOLOGY
Payer: COMMERCIAL

## 2020-02-21 DIAGNOSIS — Z12.31 ENCOUNTER FOR SCREENING MAMMOGRAM FOR BREAST CANCER: ICD-10-CM

## 2020-02-21 PROCEDURE — 77063 BREAST TOMOSYNTHESIS BI: CPT

## 2020-03-01 ENCOUNTER — MYC MEDICAL ADVICE (OUTPATIENT)
Dept: OBGYN | Facility: CLINIC | Age: 55
End: 2020-03-01

## 2020-03-02 NOTE — TELEPHONE ENCOUNTER
Please review patient MyChart message regarding recommendation for bone density scanning.    Thank you.    May Desir   [facility]  [facility]

## 2020-03-04 ENCOUNTER — OFFICE VISIT (OUTPATIENT)
Dept: UROLOGY | Facility: CLINIC | Age: 55
End: 2020-03-04
Payer: COMMERCIAL

## 2020-03-04 VITALS
DIASTOLIC BLOOD PRESSURE: 83 MMHG | TEMPERATURE: 97.6 F | HEART RATE: 95 BPM | RESPIRATION RATE: 16 BRPM | HEIGHT: 63 IN | BODY MASS INDEX: 28.88 KG/M2 | SYSTOLIC BLOOD PRESSURE: 148 MMHG | WEIGHT: 163 LBS

## 2020-03-04 DIAGNOSIS — Z85.51 PERSONAL HISTORY OF MALIGNANT NEOPLASM OF BLADDER: Primary | ICD-10-CM

## 2020-03-04 DIAGNOSIS — N95.0 PMB (POSTMENOPAUSAL BLEEDING): Primary | ICD-10-CM

## 2020-03-04 PROCEDURE — 52000 CYSTOURETHROSCOPY: CPT | Performed by: UROLOGY

## 2020-03-04 PROCEDURE — 88112 CYTOPATH CELL ENHANCE TECH: CPT | Performed by: UROLOGY

## 2020-03-04 RX ORDER — OXYCODONE HCL 10 MG/1
10 TABLET, FILM COATED, EXTENDED RELEASE ORAL ONCE
Status: CANCELLED | OUTPATIENT
Start: 2020-03-04 | End: 2020-03-04

## 2020-03-04 RX ORDER — KETOROLAC TROMETHAMINE 30 MG/ML
30 INJECTION, SOLUTION INTRAMUSCULAR; INTRAVENOUS ONCE
Status: CANCELLED | OUTPATIENT
Start: 2020-03-04 | End: 2020-03-04

## 2020-03-04 RX ORDER — ACETAMINOPHEN 325 MG/1
975 TABLET ORAL ONCE
Status: CANCELLED | OUTPATIENT
Start: 2020-03-04 | End: 2020-03-04

## 2020-03-04 RX ORDER — CEFAZOLIN SODIUM 1 G/50ML
1 INJECTION, SOLUTION INTRAVENOUS SEE ADMIN INSTRUCTIONS
Status: CANCELLED | OUTPATIENT
Start: 2020-03-04

## 2020-03-04 RX ORDER — CEFAZOLIN SODIUM 2 G/100ML
2 INJECTION, SOLUTION INTRAVENOUS
Status: CANCELLED | OUTPATIENT
Start: 2020-03-04

## 2020-03-04 ASSESSMENT — MIFFLIN-ST. JEOR: SCORE: 1298.49

## 2020-03-04 NOTE — TELEPHONE ENCOUNTER
"Inform pt that anything found within the cavity, would be removed either by \"D & C\" or more directly with a device called \"Myosure\"; either way, the anesthesia is intravenous sedation so she would not feel anything or be very aware, but will wake up very quickly.  She would need a ride home after the procedure.   I will put in orders   Annetta Angel MD   [facility]    Routing comment      Pt notified of above.  Pt reports understanding.  Patient has concerns with anesthesia that she will address with the anesthesia team the day of. She would like the same type of anesthesia that was used with her colonoscopy that was done here.     Pt does not have further questions or concerns.    May Desir   [facility]Gyn Clinic  RN    "

## 2020-03-04 NOTE — PROGRESS NOTES
Appointment source: Established Patient  Patient name: Sangita Montano  Urology Staff: Regis Wright MD    Subjective: This is a 55 year old year old female returning for follow up of 2013 low grade bladder cancer. No history of recurrence.    Objective:  Cystoscopy today was without evidence of neoplasm.     Plan:  Repeat cystoscopy in 6 months.

## 2020-03-04 NOTE — NURSING NOTE
Pt brought back to the procedure room for a cystoscopy per Dr. Wright Informed consent obtained, pt prepped in a sterile manner and a uro jet was used.      Scope serial number: [id number] Cande ARAUJO CMA ([facility])

## 2020-03-04 NOTE — TELEPHONE ENCOUNTER
Reason for Call:  Other call back    Detailed comments: Pt wondering about surgery - would a D&C need to be performed as well if something was found during hysteroscopy?  Also wondering what type of anesthesia would be used?    Pt would like to schedule on a Friday if possible- need surgery orders placed.    Phone Number Patient can be reached at: Home number on file [phone] (home) or [employer] is fine too.    Best Time:     Can we leave a detailed message on this number? YES    Call taken on 3/4/2020 at 2:15 PM by Ai Yeh

## 2020-03-04 NOTE — NURSING NOTE
"Chief Complaint   Patient presents with     Cystoscopy       Initial BP (!) 148/83 (BP Location: Right arm, Patient Position: Chair, Cuff Size: Adult Regular)   Pulse 95   Temp 97.6  F (36.4  C) (Tympanic)   Resp 16   Ht 1.6 m (5' 3\")   Wt 73.9 kg (163 lb)   BMI 28.87 kg/m   Estimated body mass index is 28.87 kg/m  as calculated from the following:    Height as of this encounter: 1.6 m (5' 3\").    Weight as of this encounter: 73.9 kg (163 lb).  Medications and allergies reviewed.    Taiwo ARAUJO CMA (AAMA)    "

## 2020-03-05 LAB — COPATH REPORT: NORMAL

## 2020-03-05 NOTE — TELEPHONE ENCOUNTER
Left a message for patient to call back.    -Ai MACIAS [facility]  Clinic Station

## 2020-03-12 NOTE — TELEPHONE ENCOUNTER
"Pt calling back - states \"I am not comfortable with scheduling this at this time.  I will probably call back and schedule a consult with Dr. Angel\"  FYI to provider.  Offered to schedule an appt - pt states she will call back.    -Ai MACIAS Rao  Clinic Station     "

## 2020-09-15 ENCOUNTER — OFFICE VISIT (OUTPATIENT)
Dept: UROLOGY | Facility: CLINIC | Age: 55
End: 2020-09-15
Payer: COMMERCIAL

## 2020-09-15 VITALS
DIASTOLIC BLOOD PRESSURE: 85 MMHG | SYSTOLIC BLOOD PRESSURE: 141 MMHG | TEMPERATURE: 98.4 F | BODY MASS INDEX: 28.87 KG/M2 | HEART RATE: 102 BPM | HEIGHT: 63 IN | WEIGHT: 162.92 LBS

## 2020-09-15 DIAGNOSIS — C67.2 MALIGNANT NEOPLASM OF LATERAL WALL OF URINARY BLADDER (H): Primary | ICD-10-CM

## 2020-09-15 DIAGNOSIS — Z85.51 PERSONAL HISTORY OF MALIGNANT NEOPLASM OF BLADDER: ICD-10-CM

## 2020-09-15 PROCEDURE — 52000 CYSTOURETHROSCOPY: CPT | Performed by: UROLOGY

## 2020-09-15 PROCEDURE — 88112 CYTOPATH CELL ENHANCE TECH: CPT | Performed by: UROLOGY

## 2020-09-15 ASSESSMENT — MIFFLIN-ST. JEOR: SCORE: 1298.13

## 2020-09-15 NOTE — NURSING NOTE
"Initial BP (!) 141/85 (BP Location: Left arm, Patient Position: Sitting, Cuff Size: Adult Regular)   Pulse 102   Temp 98.4  F (36.9  C) (Tympanic)   Ht 1.6 m (5' 3\")   Wt 73.9 kg (162 lb 14.7 oz)   BMI 28.86 kg/m   Estimated body mass index is 28.86 kg/m  as calculated from the following:    Height as of this encounter: 1.6 m (5' 3\").    Weight as of this encounter: 73.9 kg (162 lb 14.7 oz). .    Edith Manzanares MA    "

## 2020-09-15 NOTE — PROGRESS NOTES
Appointment source: Established Patient  Patient name: Sangita Montano  Urology Staff: Regis Wright MD    Subjective: This is a 55 year old year old female returning for follow up of low-grade bladder cancer.    Objective: Cystoscopy was performed today and there was no evidence of recurrence of her bladder cancer.    Assessment: History of low-grade bladder cancer without evidence of recurrence.    Plan: Return in 6 months for repeat screening cystoscopy.

## 2020-09-17 LAB — COPATH REPORT: NORMAL

## 2020-10-24 NOTE — MR AVS SNAPSHOT
"              After Visit Summary   11/23/2018    Sangita Montano    MRN: [id number]           Patient Information     Date Of Birth          1965        Visit Information        Provider Department      11/23/2018 2:20 PM RENALDO SAME DAY PROVIDER [facility]        Today's Diagnoses     Acute dermatitis    -  1      Care Instructions      Start prednisone in am and taper down dose as instructed    Start triamcinolone creme 2 times a day to rash    If on face use over the counter hydrocortisone cream    Start benadryl at night 25 mg capsule at bedtime    If rash is not getting on the steroid, start claritin 10 mg a day until rash    \Skin Dermatitis (Rash) Adult Description  The word \"rash\" means an outbreak of red bumps on the body. The way people use this term, \"a rash\" can refer to many different skin conditions.   A rash is an abnormal change in skin color or texture. Rashes usually result from skin irritation, which can have many causes.  Symptoms  Rashes can have different appearances. A general description is raised, red, itchy, and sometimes scaly areas that may vary in size and shape.   Causes  There are many possible causes for a rash. They include:  viruses   chickenpox   measles ([place])   [place] measles   Fifth disease   roseola   bacterial infections like impetigo and scarlet fever   Lyme disease, Sridhar Mountain spotted fever   heat rash (prickly heat)   contact with something you are allergic to, such as medicines like penicillin   soaps and detergents   scabies   ringworm   germs in hot tubs   swimmer s itch   What You Should Do At Home (Follow-up Care)   Keep your skin moisturized with lotion. Apply it several times a day if needed.   You can try taking a cool bath or shower (not hot because it could make you itch more) or apply calamine lotion.   You may also try a commercial product, such as Aveeno  Colloidal Oatmeal bath.   Do not scrub your skin. Do not rub vigorously " EMERGENCY DEPARTMENT HISTORY AND PHYSICAL EXAM    Date: 10/24/2020  Patient Name: Simone Hart    History of Presenting Illness     Chief Complaint   Patient presents with    Groin Pain         History Provided By: Patient      Simone Hart is a 57 y.o. male with PMHX of schizophrenia who presents to the emergency department C/O urinary hesitancy. Patient reports difficulty initiating his urinary stream for about 5 weeks. States some discomfort when he urinates in his penis. Reports that sometimes he has to stand for long periods of time to void. Says this is been ongoing issue and he figured today he would get it checked out. No fever. No testicular pain. PCP: Aime Wang MD    Current Outpatient Medications   Medication Sig Dispense Refill    lurasidone (LATUDA) 40 mg tab tablet Take 40 mg by mouth.  amantadine HCL (SYMMETREL) 100 mg capsule Take 100 mg by mouth two (2) times a day.  imipramine (TOFRANIL) 25 mg tablet Take 25 mg by mouth nightly.  famotidine (PEPCID) 20 mg tablet Take 20 mg by mouth two (2) times a day.  divalproex ER (DEPAKOTE ER) 500 mg ER tablet Take 500 mg by mouth.  traZODone (DESYREL) 100 mg tablet Take 100 mg by mouth nightly.  coal tar (DENOREX EX ST MEDICATED/COND EX) by Apply Externally route.  tamsulosin (Flomax) 0.4 mg capsule Take 1 Cap by mouth daily for 30 days. 30 Cap 0       Past History     Past Medical History:  Past Medical History:   Diagnosis Date    Depression     Schizophrenia [facility])        Past Surgical History:  History reviewed. No pertinent surgical history. Family History:  History reviewed. No pertinent family history.     Social History:  Social History     Tobacco Use    Smoking status: Never Smoker    Smokeless tobacco: Never Used   Substance Use Topics    Alcohol use: Not Currently    Drug use: Not on file       Allergies:  No Known Allergies      Review of Systems   Review of Systems Genitourinary: Positive for difficulty urinating and penile pain. Negative for decreased urine volume, discharge, dysuria, hematuria, penile swelling, scrotal swelling and urgency. All other systems reviewed and are negative. Physical Exam     Vitals:    10/24/20 1055   BP: 113/75   Pulse: (!) 105   Resp: 14   Temp: 97.3 °F (36.3 °C)   SpO2: 100%     Physical Exam  Vitals signs and nursing note reviewed. Constitutional:       General: He is not in acute distress. Appearance: Normal appearance. HENT:      Head: Normocephalic and atraumatic. Eyes:      Extraocular Movements: Extraocular movements intact. Conjunctiva/sclera: Conjunctivae normal.   Neck:      Musculoskeletal: Normal range of motion. Cardiovascular:      Rate and Rhythm: Normal rate and regular rhythm. Pulmonary:      Effort: Pulmonary effort is normal. No respiratory distress. Abdominal:      General: Bowel sounds are normal. There is no distension. Palpations: Abdomen is soft. Tenderness: There is no abdominal tenderness. Genitourinary:     Penis: Normal.    Musculoskeletal: Normal range of motion. General: No deformity. Neurological:      General: No focal deficit present. Mental Status: He is alert and oriented to person, place, and time. Mental status is at baseline.    Psychiatric:         Mood and Affect: Mood normal.         Behavior: Behavior normal.         Diagnostic Study Results     Labs -     Recent Results (from the past 12 hour(s))   CBC WITH AUTOMATED DIFF    Collection Time: 10/24/20 11:19 AM   Result Value Ref Range    WBC 5.1 4.6 - 13.2 K/uL    RBC 4.29 (L) 4.70 - 5.50 M/uL    HGB 13.1 13.0 - 16.0 g/dL    HCT 38.8 36.0 - 48.0 %    MCV 90.4 74.0 - 97.0 FL    MCH 30.5 24.0 - 34.0 PG    MCHC 33.8 31.0 - 37.0 g/dL    RDW 13.5 11.6 - 14.5 %    PLATELET 683 802 - 269 K/uL    MPV 10.1 9.2 - 11.8 FL    NEUTROPHILS 60 40 - 73 %    LYMPHOCYTES 30 21 - 52 %    MONOCYTES 9 3 - 10 % when drying. Pat your skin dry.   Use as little soap as possible. Use a gentle cleanser such as Basis  or Dove .   Apply a cool damp washcloth to skin areas that are itchy.   Avoid heat or rubbing, which can make you itch more.   Avoid strenuous exercise or activity. This often makes itching worse.   Over-the-counter hydrocortisone cream can be applied to small areas.   Over-the-counter antihistamines such as diphenhydramine taken by mouth may help decrease itching.   If the itching is severe or not responding to the above treatments, your provider may prescribe an oral steroid medicine (for example, prednisone).   Keep your fingernails cut short so that you do not scratch yourself in your sleep.   Wear loose cotton clothing or other natural fibers.   Don t put cosmetics (makeup) on a rash.   Please keep all medicines out of the reach of children.   What You Can Do To Stay Healthy   Try not to change soaps or detergents that you know you have no reaction to. If you do need to change soaps or detergents, check your skin for any reaction.   Always monitor yourself closely after starting any new medicine or new food.   Care Alerts  Call 911 if:   You start to have trouble breathing, trouble swallowing, or feel tightness in your throat or chest.   You have trouble breathing, lightheadedness, nausea, or a cold sweat.   Call your healthcare provider right away or return to the emergency department if:   Your rash is getting worse.   You feel weak, dizzy, or lightheaded.   Your skin is becoming redder or more painful.   You have open sores from scratching that have red streaks from the wound going toward your heart.   The area feels very warm when you touch it.   You start to have pus or other fluid coming from the area.   You have a fever higher than 101.5  F (38.6  C) orally.   You start to have chills, nausea, vomiting, or muscle aches.   You have a question about whether your rash needs to be treated.   You have any  EOSINOPHILS 1 0 - 5 %    BASOPHILS 0 0 - 2 %    ABS. NEUTROPHILS 3.1 1.8 - 8.0 K/UL    ABS. LYMPHOCYTES 1.5 0.9 - 3.6 K/UL    ABS. MONOCYTES 0.4 0.05 - 1.2 K/UL    ABS. EOSINOPHILS 0.0 0.0 - 0.4 K/UL    ABS. BASOPHILS 0.0 0.0 - 0.1 K/UL    DF AUTOMATED     METABOLIC PANEL, COMPREHENSIVE    Collection Time: 10/24/20 11:19 AM   Result Value Ref Range    Sodium 138 136 - 145 mmol/L    Potassium 3.6 3.5 - 5.5 mmol/L    Chloride 105 100 - 111 mmol/L    CO2 29 21 - 32 mmol/L    Anion gap 4 3.0 - 18 mmol/L    Glucose 93 74 - 99 mg/dL    BUN 16 7.0 - 18 MG/DL    Creatinine 0.91 0.6 - 1.3 MG/DL    BUN/Creatinine ratio 18 12 - 20      GFR est AA >60 >60 ml/min/1.73m2    GFR est non-AA >60 >60 ml/min/1.73m2    Calcium 8.9 8.5 - 10.1 MG/DL    Bilirubin, total 0.5 0.2 - 1.0 MG/DL    ALT (SGPT) 21 16 - 61 U/L    AST (SGOT) 16 10 - 38 U/L    Alk. phosphatase 87 45 - 117 U/L    Protein, total 6.7 6.4 - 8.2 g/dL    Albumin 3.5 3.4 - 5.0 g/dL    Globulin 3.2 2.0 - 4.0 g/dL    A-G Ratio 1.1 0.8 - 1.7     LIPASE    Collection Time: 10/24/20 11:19 AM   Result Value Ref Range    Lipase 47 (L) 73 - 393 U/L   URINALYSIS W/ RFLX MICROSCOPIC    Collection Time: 10/24/20 12:30 PM   Result Value Ref Range    Color DARK YELLOW      Appearance CLEAR      Specific gravity >1.030 (H) 1.005 - 1.030    pH (UA) 5.5 5.0 - 8.0      Protein Negative NEG mg/dL    Glucose Negative NEG mg/dL    Ketone Negative NEG mg/dL    Bilirubin Negative NEG      Blood Negative NEG      Urobilinogen 1.0 0.2 - 1.0 EU/dL    Nitrites Negative NEG      Leukocyte Esterase Negative NEG         Radiologic Studies -   No orders to display     CT Results  (Last 48 hours)    None        CXR Results  (Last 48 hours)    None          Medications given in the ED-  Medications - No data to display      Medical Decision Making   I am the first provider for this patient.     I reviewed the vital signs, available nursing notes, past medical history, past surgical history, family history symptoms that worry you.             Follow-ups after your visit        Your next 10 appointments already scheduled     Dec 14, 2018  1:15 PM CST   (Arrive by 1:00 PM)   MA SCREENING DIGITAL BILATERAL with [facility]2   [facility] ([facility])    [address] [place]  [facility] [address]   [phone]           How do I prepare for my exam? (Food and drink instructions) No Food and Drink Restrictions.  How do I prepare for my exam? (Other instructions) Do not use any powder, lotion or deodorant under your arms or on your breast. If you do, we will ask you to remove it before your exam.  What should I wear: Wear comfortable, two-piece clothing.  How long does the exam take: Most scans will take 15 minutes.  What should I bring: Bring any previous mammograms from other facilities or have them mailed to the breast center.  Do I need a :  No  is needed.  What do I need to tell my doctor: If you have any allergies, tell your care team.  What should I do after the exam: No restrictions, You may resume normal activities.  What is this test: This test is an x-ray of the breast to look for breast disease. The breast is pressed between two plates to flatten and spread the tissue. An X-ray is taken of the breast from different angles.  Who should I call with questions: If you have any questions, please call the Imaging Department where you will have your exam. Directions, parking instructions, and other information is available on our website, [email or web address].  Other information about my exam Three-dimensional (3D) mammograms are available at [facility] locations in [facility], [facility], [facility], [place], [facility] and [place].  Health locations include [place] and the [facility] in [place].  Benefits of 3D mammograms include * Improved rate of cancer detection * Decreases your chance of having to go back for more tests, which means  "fewer: * \"False-positive\" results (This means that there is an abnormal area but it isn't cancer.) * Invasive testing procedures, such as a biopsy or surgery * Can provide clearer images of the breast if you have dense breast tissue.  *3D mammography is an optional exam that anyone can have with a 2D mammogram. It doesn't replace or take the place of a 2D mammogram. 2D mammograms remain an effective screening test for all women.  Not all insurance companies cover the cost of a 3D mammogram. Check with your insurance. Three-dimensional (3D) mammograms are available at [place] locations in [facility], [facility], [facility], and [place]. [facility] locations include [facility] in [place]. Benefits of 3D mammograms include: - Improved rate of cancer detection - Decreases your chance of having to go back for more tests, which means fewer: - \"False-positive\" results (This means that there is an abnormal area but it isn't cancer.) - Invasive testing procedures, such as a biopsy or surgery - Can provide clearer images of the breast if you have dense breast tissue. 3D mammography is an optional exam that anyone can have with a 2D mammogram. It doesn't replace or take the place of a 2D mammogram. 2D mammograms remain an effective screening test for all women.  Not all insurance companies cover the cost of a 3D mammogram. Check with your insurance.            Jan 08, 2019 10:15 AM CST   [facility] with DONG Wright MD   [facility]    [address] [address] [phone]              Who to contact     If you have questions or need follow up information about today's clinic visit or your schedule please contact [facility] directly at [phone].  Normal or non-critical lab and imaging results will be communicated to you by MyChart, letter or phone within 4 business days " and social history. Vital Signs-Reviewed the patient's vital signs. Pulse Oximetry Analysis and Interpretation:   100% on RA, normal          Records Reviewed: Nursing Notes    Provider Notes (Medical Decision Making): Tom Garay is a 57 y.o. male who is here with complaints of urinary discomfort. Describes symptoms of enlarged prostate and urinary retention. He is able to void freely however and brought a urine sample here which is negative for signs of infection. Exam is also benign. Plan on urology follow-up for evaluation of his stream issues and will start on Flomax. BPH but they need to be evaluated for prostate cancer given his age and risk    Procedures:  Procedures    ED Course:       Diagnosis and Disposition     Critical Care:    DISCHARGE NOTE:    Trev Herbert  results have been reviewed with him. He has been counseled regarding his diagnosis, treatment, and plan. He verbally conveys understanding and agreement of the signs, symptoms, diagnosis, treatment and prognosis and additionally agrees to follow up as discussed. He also agrees with the care-plan and conveys that all of his questions have been answered. I have also provided discharge instructions for him that include: educational information regarding their diagnosis and treatment, and list of reasons why they would want to return to the ED prior to their follow-up appointment, should his condition change. He has been provided with education for proper emergency department utilization. CLINICAL IMPRESSION:    1. Urinary hesitancy        PLAN:  1. D/C Home  2. Current Discharge Medication List      START taking these medications    Details   tamsulosin (Flomax) 0.4 mg capsule Take 1 Cap by mouth daily for 30 days. Qty: 30 Cap, Refills: 0           3.    Follow-up Information     Follow up With Specialties Details Why Contact Info    Kiko Patel MD Internal Medicine Schedule an appointment as soon as possible after the clinic has received the results. If you do not hear from us within 7 days, please contact the clinic through [employer] or phone. If you have a critical or abnormal lab result, we will notify you by phone as soon as possible.  Submit refill requests through [employer] or call your pharmacy and they will forward the refill request to us. Please allow 3 business days for your refill to be completed.          Additional Information About Your Visit        [employer] Information     [employer] gives you secure access to your electronic health record. If you see a primary care provider, you can also send messages to your care team and make appointments. If you have questions, please call your primary care clinic.  If you do not have a primary care provider, please call [phone] and they will assist you.        Care EveryWhere ID     This is your Care EveryWhere ID. This could be used by other organizations to access your [facility] medical records  [id number]        Your Vitals Were     Pulse Temperature Respirations BMI (Body Mass Index)          96 99.1  F (37.3  C) (Tympanic) 14 27.28 kg/m2         Blood Pressure from Last 3 Encounters:   11/23/18 108/62   09/20/18 127/74   09/17/18 120/62    Weight from Last 3 Encounters:   11/23/18 154 lb (69.9 kg)   09/20/18 164 lb (74.4 kg)   09/17/18 164 lb (74.4 kg)              Today, you had the following     No orders found for display         Today's Medication Changes          These changes are accurate as of 11/23/18  4:08 PM.  If you have any questions, ask your nurse or doctor.               Start taking these medicines.        Dose/Directions    predniSONE 20 MG tablet   Commonly known as:  DELTASONE   Used for:  Acute dermatitis        Take 3 tabs (60 mg) by mouth daily x 3 days, 2 tabs (40 mg) daily x 3 days, 1 tab (20 mg) daily x 3 days, then 1/2 tab (10 mg) x 3 days.   Quantity:  20 tablet   Refills:  0       triamcinolone 0.1 % cream   Commonly known as:  KENALOG  for a visit  For primary care follow up [address] [facility]      Melissa Arango MD Urology Schedule an appointment as soon as possible for a visit  Urology [address] [phone]          _______________________________      Please note that this dictation was completed with [employer]cod[employer], the computer voice recognition software. Quite often unanticipated grammatical, syntax, homophones, and other interpretive errors are inadvertently transcribed by the computer software. Please disregard these errors. Please excuse any errors that have escaped final proofreading.   Used for:  Acute dermatitis        Apply topically 2 times daily   Quantity:  80 g   Refills:  1         Stop taking these medicines if you haven't already. Please contact your care team if you have questions.     GINSENG PO           HERBALS           HERBALS           L-GLUTAMINE PO           LICORICE                Where to get your medicines      These medications were sent to [id number] - [place], [place] - [address]  [address] [address]    Hours:  Closed 10-16-08 business to [facility] Phone:  [phone]     predniSONE 20 MG tablet    triamcinolone 0.1 % cream                Primary Care Provider Office Phone # Fax #    [facility] [phone] [address] [address]        Equal Access to Services     TONEY GO : Hadii gucci acuñao Socassidy, waaxda luqadaha, qaybta kaalmada adeegyada, jared vasquez . So [facility] [phone].    ATENCIÓN: Si habla español, tiene a landa disposición servicios gratuitos de asistencia lingüística. Llame al [phone].    We comply with applicable federal civil rights laws and [place] laws. We do not discriminate on the basis of race, color, national origin, age, disability, sex, sexual orientation, or gender identity.            Thank you!     Thank you for choosing [facility]  for your care. Our goal is always to provide you with excellent care. Hearing back from our patients is one way we can continue to improve our services. Please take a few minutes to complete the written survey that you may receive in the mail after your visit with us. Thank you!             Your Updated Medication List - Protect others around you: Learn how to safely use, store and throw away your medicines at www.disposemymeds.org.          This list is accurate as of 11/23/18  4:08 PM.  Always use your most recent med list.                   Brand Name  Dispense Instructions for use Diagnosis    ascorbic acid 500 MG tablet    VITAMIN C     Take by mouth daily 9820-7936 mg daily        BIFIDOBACTERIUM BIFIDUS      300 mg 2 in am and 1 in pm        BORAGE OIL-GLA-LINOLEIC ACID PO      200 mg GLA, 3000 mg Borage oil, 600 mg Linolenic acid, 30 mg Vitamin E        fish oil-omega-3 fatty acids 1000 MG capsule      Take 1,000 mg by mouth daily 3 tablets daily        MAGNESIUM GLYCINATE      100 mg qd        NEW MED      Ortho Digestyme: blend of pancreatin, pepsin, bromelain, papain, betaine, taken as directed    Dyspepsia and other specified disorders of function of stomach       predniSONE 20 MG tablet    DELTASONE    20 tablet    Take 3 tabs (60 mg) by mouth daily x 3 days, 2 tabs (40 mg) daily x 3 days, 1 tab (20 mg) daily x 3 days, then 1/2 tab (10 mg) x 3 days.    Acute dermatitis       triamcinolone 0.1 % cream    KENALOG    80 g    Apply topically 2 times daily    Acute dermatitis       Vitamin D-3 5000 units Tabs

## 2020-11-16 ENCOUNTER — HEALTH MAINTENANCE LETTER (OUTPATIENT)
Age: 55
End: 2020-11-16

## 2020-11-20 ENCOUNTER — NURSE TRIAGE (OUTPATIENT)
Dept: FAMILY MEDICINE | Facility: CLINIC | Age: 55
End: 2020-11-20

## 2020-11-20 NOTE — TELEPHONE ENCOUNTER
Reason for call:  Patient reporting a symptom    Symptom or request: upper abdominal pain into the spleen and intestines    Duration (how long have symptoms been present): few weeks off and on    Have you been treated for this before? Yes a number of years ago    Additional comments:     Phone Number patient can be reached at:  Home number on file [phone] (home)    Best Time:  any    Can we leave a detailed message on this number:  YES    Call taken on 11/20/2020 at 9:35 AM by Flavia Morrissey

## 2020-11-24 ENCOUNTER — OFFICE VISIT (OUTPATIENT)
Dept: FAMILY MEDICINE | Facility: CLINIC | Age: 55
End: 2020-11-24
Payer: COMMERCIAL

## 2020-11-24 VITALS
OXYGEN SATURATION: 98 % | WEIGHT: 171 LBS | DIASTOLIC BLOOD PRESSURE: 68 MMHG | SYSTOLIC BLOOD PRESSURE: 130 MMHG | HEART RATE: 98 BPM | TEMPERATURE: 99 F | BODY MASS INDEX: 30.29 KG/M2 | RESPIRATION RATE: 16 BRPM

## 2020-11-24 DIAGNOSIS — R10.12 LUQ ABDOMINAL PAIN: Primary | ICD-10-CM

## 2020-11-24 PROCEDURE — 99214 OFFICE O/P EST MOD 30 MIN: CPT | Performed by: INTERNAL MEDICINE

## 2020-11-24 RX ORDER — FAMOTIDINE 40 MG/1
40 TABLET, FILM COATED ORAL DAILY
Qty: 14 TABLET | Refills: 0 | Status: SHIPPED | OUTPATIENT
Start: 2020-11-24 | End: 2023-03-06

## 2020-11-24 RX ORDER — GLUTAMINE 100 %
POWDER (GRAM) ORAL
COMMUNITY
End: 2021-03-16

## 2020-11-24 NOTE — PROGRESS NOTES
Subjective     Sangita Montano is a 55 year old female who presents to clinic today for the following health issues:    HPI     Flu shot: Declined  Shingles: Check insurance  Td: Declined    Today pt verbalize the pain got better since schedule     Triaged : 11/2020: reported: She noticed pain when she ate bread and brownies with almond flour - she is trying a new diet  --did have a rash across upper abdomen, was mildly itchy.  Did not look like shingles which she has had in the past, was not a blistering rash.         Abdominal/Flank Pain  Onset/Duration: early November  Description:   Character: Burning  Location: left upper quadrant  Radiation: left pleural site  Intensity: Current 3/10 - worst 10/10  Progression of Symptoms:  worsening and intermittent  Accompanying Signs & Symptoms:  Fever/Chills: no  Gas/Bloating: YES- bloating  Nausea: no  Vomitting: no  Diarrhea: yes, slightly looser than normal, not more frequent  Constipation: no  Dysuria or Hematuria: no  History:   Trauma: no  Previous similar pain: YES- 2013   Previous tests done: none  Precipitating factors:   Does the pain change with:     Food: YES- gets better    Bowel Movement: YES- usually goes 2 to 3 times a day, Bm color was off and now is back to be okay.  Recently having 1 bowel movement day    Urination: no   Other factors:  YES- see above detail about diet   Therapies tried and outcome: Spectrazyme Metagest, Biotic 7, and Ortho Digestyme:   No LMP recorded. Feb 2020  --no GERD symptoms  --had episode of abdominal pain in 2013, studies as below  --was on GERD med but it caused constipation  --works with a nutritionist for supplements.  --sometimes gets GERD just before period        2013 -IMPRESSION:  Normal nuclear hepatobiliary scan without evidence for acute  cholecystitis. Normal gallbladder ejection fraction  .      Review of Systems   Constitutional, HEENT, cardiovascular, pulmonary, GI, , musculoskeletal, neuro, skin, endocrine  and psych systems are negative, except as otherwise noted.      Objective    /68   Pulse 98   Temp 99  F (37.2  C) (Tympanic)   Resp 16   Wt 77.6 kg (171 lb)   SpO2 98%   Breastfeeding No   BMI 30.29 kg/m    Body mass index is 30.29 kg/m .  Physical Exam   GENERAL APPEARANCE: healthy, alert and no distress  RESP: lungs clear to auscultation - no rales, rhonchi or wheezes  CV: regular rates and rhythm, normal S1 S2, no S3 or S4 and no murmur, click or rub  ABDOMEN: Soft, normal bowel sounds, mild epigastric pain, no organomegaly          Assessment & Plan     LUQ abdominal pain -probable gastritis.  Intolerance to PPI.  Start H2 blocker.  If no improvement in 1 to 2 weeks, would get CMP, CBC, lipase, consider ultrasound versus CT.  - Helicobacter pylori Antigen Stool; Future  - famotidine (PEPCID) 40 MG tablet; Take 1 tablet (40 mg) by mouth daily            Patient Instructions   1. Order for H pylori stool test  2. Start pepcid 40 mg daily x 2 weeks      No follow-ups on file.    Alma Delia Burns, [facility]

## 2020-11-27 DIAGNOSIS — R10.12 LUQ ABDOMINAL PAIN: ICD-10-CM

## 2020-11-27 PROCEDURE — 87338 HPYLORI STOOL AG IA: CPT | Performed by: INTERNAL MEDICINE

## 2020-11-30 LAB — H PYLORI AG STL QL IA: NEGATIVE

## 2020-12-10 ENCOUNTER — MYC MEDICAL ADVICE (OUTPATIENT)
Dept: FAMILY MEDICINE | Facility: CLINIC | Age: 55
End: 2020-12-10

## 2020-12-10 DIAGNOSIS — R10.84 ABDOMINAL PAIN, GENERALIZED: Primary | ICD-10-CM

## 2020-12-10 NOTE — TELEPHONE ENCOUNTER
"S: Patient sends Your.MD message about LUQ pain controlled with medication, but \"full feeling\" persistent and problem not alleviated.     B: Per dictation from 11/24/20 Office visit :     Assessment & Plan  LUQ abdominal pain -probable gastritis.  Intolerance to PPI.  Start H2 blocker.  If no improvement in 1 to 2 weeks, would get CMP, CBC, lipase, consider ultrasound versus CT.  - Helicobacter pylori Antigen Stool; Future  - famotidine (PEPCID) 40 MG tablet; Take 1 tablet (40 mg) by mouth daily    A: per Your.MD message:  Yesterday, I started to have some pain again that persisted throughout the day. I took my last pill last night and the pain has eased  but the \"full feeling\" is still there.  I think perhaps the medication has eased the pain but has not alleviated the problem? Please advise on what I should do next? I think you had mentioned making an appointment for a scan.     R: Provider please review and advise. Thank you.    "

## 2020-12-14 NOTE — TELEPHONE ENCOUNTER
"Pt scheduled for CT on 12/15/20 and labs scheduled for 12/23/20.  Pt is taking OTC Pepcid 40 mg 1 tab/day to help with the \"burning\".  She finished the 2 weeks that were ordered.  Is it OK for her to continue this med?  Advise.  KPavelRN  "

## 2020-12-15 ENCOUNTER — HOSPITAL ENCOUNTER (OUTPATIENT)
Dept: CT IMAGING | Facility: CLINIC | Age: 55
Discharge: HOME | End: 2020-12-15
Attending: INTERNAL MEDICINE | Admitting: INTERNAL MEDICINE
Payer: COMMERCIAL

## 2020-12-15 DIAGNOSIS — R10.84 ABDOMINAL PAIN, GENERALIZED: ICD-10-CM

## 2020-12-15 PROCEDURE — 74177 CT ABD & PELVIS W/CONTRAST: CPT

## 2020-12-15 PROCEDURE — 250N000009 HC RX 250: Performed by: RADIOLOGY

## 2020-12-15 PROCEDURE — 250N000011 HC RX IP 250 OP 636: Performed by: RADIOLOGY

## 2020-12-15 RX ORDER — IOPAMIDOL 755 MG/ML
83 INJECTION, SOLUTION INTRAVASCULAR ONCE
Status: COMPLETED | OUTPATIENT
Start: 2020-12-15 | End: 2020-12-15

## 2020-12-15 RX ADMIN — SODIUM CHLORIDE 61 ML: 9 INJECTION, SOLUTION INTRAVENOUS at 15:58

## 2020-12-15 RX ADMIN — IOPAMIDOL 83 ML: 755 INJECTION, SOLUTION INTRAVENOUS at 15:58

## 2020-12-15 NOTE — RESULT ENCOUNTER NOTE
The CT does not show any clear cause for the abdominal pain.  There is a uncertain mass/cyst near the rectum.  It has been seen since 2015 and is not significantly changed in size.  It is likely it is a benign cyst, given it has been virtually the same size for 5+ years.  However given the history of bladder cancer, I will route to Dr. Wright, your Urologist.

## 2020-12-18 ENCOUNTER — MYC MEDICAL ADVICE (OUTPATIENT)
Dept: FAMILY MEDICINE | Facility: CLINIC | Age: 55
End: 2020-12-18

## 2020-12-18 DIAGNOSIS — R10.84 ABDOMINAL PAIN, GENERALIZED: Primary | ICD-10-CM

## 2020-12-18 NOTE — TELEPHONE ENCOUNTER
Please see [employer] message. The labs are pended for review.    Thank you    Carlie DENNEY RN

## 2020-12-23 DIAGNOSIS — R10.84 ABDOMINAL PAIN, GENERALIZED: ICD-10-CM

## 2020-12-23 LAB
ALBUMIN SERPL-MCNC: 3.8 G/DL (ref 3.4–5)
ALP SERPL-CCNC: 69 U/L (ref 40–150)
ALT SERPL W P-5'-P-CCNC: 31 U/L (ref 0–50)
ANION GAP SERPL CALCULATED.3IONS-SCNC: 5 MMOL/L (ref 3–14)
AST SERPL W P-5'-P-CCNC: 18 U/L (ref 0–45)
BILIRUB SERPL-MCNC: 0.5 MG/DL (ref 0.2–1.3)
BUN SERPL-MCNC: 16 MG/DL (ref 7–30)
CALCIUM SERPL-MCNC: 9.8 MG/DL (ref 8.5–10.1)
CHLORIDE SERPL-SCNC: 102 MMOL/L (ref 94–109)
CO2 SERPL-SCNC: 29 MMOL/L (ref 20–32)
CREAT SERPL-MCNC: 0.86 MG/DL (ref 0.52–1.04)
ERYTHROCYTE [DISTWIDTH] IN BLOOD BY AUTOMATED COUNT: 13.9 % (ref 10–15)
FERRITIN SERPL-MCNC: 120 NG/ML (ref 8–252)
GFR SERPL CREATININE-BSD FRML MDRD: 75 ML/MIN/{1.73_M2}
GLUCOSE SERPL-MCNC: 101 MG/DL (ref 70–99)
HCT VFR BLD AUTO: 42.6 % (ref 35–47)
HGB BLD-MCNC: 13.7 G/DL (ref 11.7–15.7)
LIPASE SERPL-CCNC: 158 U/L (ref 73–393)
MCH RBC QN AUTO: 28.7 PG (ref 26.5–33)
MCHC RBC AUTO-ENTMCNC: 32.2 G/DL (ref 31.5–36.5)
MCV RBC AUTO: 89 FL (ref 78–100)
PLATELET # BLD AUTO: 245 10E9/L (ref 150–450)
POTASSIUM SERPL-SCNC: 3.9 MMOL/L (ref 3.4–5.3)
PROT SERPL-MCNC: 7.5 G/DL (ref 6.8–8.8)
RBC # BLD AUTO: 4.78 10E12/L (ref 3.8–5.2)
SODIUM SERPL-SCNC: 136 MMOL/L (ref 133–144)
TSH SERPL DL<=0.005 MIU/L-ACNC: 1 MU/L (ref 0.4–4)
VIT B12 SERPL-MCNC: 549 PG/ML (ref 193–986)
WBC # BLD AUTO: 6 10E9/L (ref 4–11)

## 2020-12-23 PROCEDURE — 85027 COMPLETE CBC AUTOMATED: CPT | Performed by: INTERNAL MEDICINE

## 2020-12-23 PROCEDURE — 83690 ASSAY OF LIPASE: CPT | Performed by: INTERNAL MEDICINE

## 2020-12-23 PROCEDURE — 82728 ASSAY OF FERRITIN: CPT | Performed by: INTERNAL MEDICINE

## 2020-12-23 PROCEDURE — 84443 ASSAY THYROID STIM HORMONE: CPT | Performed by: INTERNAL MEDICINE

## 2020-12-23 PROCEDURE — 82306 VITAMIN D 25 HYDROXY: CPT | Performed by: INTERNAL MEDICINE

## 2020-12-23 PROCEDURE — 36415 COLL VENOUS BLD VENIPUNCTURE: CPT | Performed by: INTERNAL MEDICINE

## 2020-12-23 PROCEDURE — 82607 VITAMIN B-12: CPT | Performed by: INTERNAL MEDICINE

## 2020-12-23 PROCEDURE — 80053 COMPREHEN METABOLIC PANEL: CPT | Performed by: INTERNAL MEDICINE

## 2020-12-24 LAB — DEPRECATED CALCIDIOL+CALCIFEROL SERPL-MC: 51 UG/L (ref 20–75)

## 2021-01-06 ENCOUNTER — MYC MEDICAL ADVICE (OUTPATIENT)
Dept: FAMILY MEDICINE | Facility: CLINIC | Age: 56
End: 2021-01-06

## 2021-01-08 ENCOUNTER — OFFICE VISIT (OUTPATIENT)
Dept: FAMILY MEDICINE | Facility: CLINIC | Age: 56
End: 2021-01-08
Payer: COMMERCIAL

## 2021-01-08 VITALS
DIASTOLIC BLOOD PRESSURE: 68 MMHG | RESPIRATION RATE: 16 BRPM | SYSTOLIC BLOOD PRESSURE: 126 MMHG | WEIGHT: 162 LBS | OXYGEN SATURATION: 98 % | TEMPERATURE: 97.8 F | HEART RATE: 93 BPM | BODY MASS INDEX: 28.7 KG/M2

## 2021-01-08 DIAGNOSIS — R10.12 LUQ ABDOMINAL PAIN: Primary | ICD-10-CM

## 2021-01-08 DIAGNOSIS — N64.4 BREAST PAIN, RIGHT: ICD-10-CM

## 2021-01-08 DIAGNOSIS — T78.1XXA FOOD SENSITIVITY WITH GASTROINTESTINAL SYMPTOMS: ICD-10-CM

## 2021-01-08 PROCEDURE — 99214 OFFICE O/P EST MOD 30 MIN: CPT | Performed by: INTERNAL MEDICINE

## 2021-01-08 NOTE — PATIENT INSTRUCTIONS
?Cyst:   1. Seen on CT scan.  You did discuss with Dr. Wright    Abdominal Pain:  1. Improving, continue to allow healing

## 2021-01-08 NOTE — PROGRESS NOTES
Assessment & Plan     LUQ abdominal pain - appears benign, residual gastritis.  Patient prefers to stop pepcid.      Food sensitivity with gastrointestinal symptoms - continue with nutritionist.  Advised to use caution with excessive supplements; warned of pseudoscience    Breast pain, right - fibrocystic changes.  No specific follow-up needed                             No follow-ups on file.    Alma Delia Burns DO  [facility]    Jeni Faye is a 55 year old who presents to clinic today for the following health issues     HPI     All Vaccines declined last visit.    Feels better but still there      Abdominal/Flank Pain  Onset/Duration: Follow up  Description:   Character: Dull ache, Burning, Fullness and Cramping  Location: left upper quadrant  Radiation: None  Intensity: moderate  Progression of Symptoms:  improving and intermittent  Accompanying Signs & Symptoms:  Fever/Chills: no  Gas/Bloating: YES  Nausea: no  Vomitting: no  Diarrhea: no  Constipation: no  Dysuria or Hematuria: no  History:   Trauma: no  Previous similar pain: YES  Previous tests done: CT  Precipitating factors:   Does the pain change with:     Food: YES- last meal     Bowel Movement: no    Urination: no   Other factors:  no  Therapies tried and outcome: None  No LMP recorded.   --Last visit I suspected gastritis.  Intolerance to PPI, started Pepcid 40 mg daily.  CMP, thyroid, B12, vitamin D, lipase, blood count, blood sugar, H pylori was all negative.  CT scan was negative for acute intra-abdominal pathology, but there was a cyst near the rectum.  --symptoms have improved a lot - pain is not as severe.  No longer having looser and more frequent stools  --the pain is not as sharp, is now just dull.  Pain is very minimal throughout the day, worse end of the day.  Takes pepcid at night.  --following a 'low histamine diet' with a nutritionist and is feeling better w/this - taste and smell are better,  "getting better sleep, less 'sinus congestion'.  --the nutrionist is recommending an alternative enzyme \"DNO\".  It is all natural.    CT    FINDINGS:     Abdomen: Slight fatty infiltration all the liver is present.     Pelvis:  There is a well-defined oval collection at the posterior  margin of the rectum and inferior to the coccyx. This is seen on axial  image 76 and coronal image 77. It s suspected to have a neck likely  extension to the right posterolateral rectal margin. There is no gas  within this so it does not likely communicate with the rectum. There  is no significant inflammation around this. It measures 3.3 x 2.7 x 3  cm in cranial-caudal dimension. Comparable measurements on the prior  study are 2.6 x 1.4 x 2.3 cm. It measures slightly higher than simple  water Hounsfield units suggesting that it is mildly complex.                                                                      IMPRESSION:    1. No acute finding nor specific reason for generalized abdominal pain is demonstrated.  2. There is a chronic well-defined collection of the right posterolateral margin all the rectum which is modestly larger compared to 2015.  It is of uncertain etiology, as described above.    Dr. Wright did call patient and reassured her about the cyst. She is not worried about this.         Musculoskeletal problem/pain  Onset/Duration: 3 to 4 weeks   Description  Location: arm - right (armpit ) feels like there is a lump there - the pain radiates to the right breats - the pain sometimes radiates to the upper right arm - when not wear a bra hurts more  Joint Swelling: no  Redness: no  Pain: YES - off and on itches - nipple has no problem  Warmth: YES- somewhat  Intensity:  moderate  Progression of Symptoms:  constant  Accompanying signs and symptoms:   Fevers: no  Numbness/tingling/weakness: YES  History  Trauma to the area: no  Recent illness:  no  Previous similar problem: no  Previous evaluation:  no  Precipitating or " alleviating factors:  Aggravating factors include: none  Therapies tried and outcome: nothing  When she has more LUQ pain, the right axilla/breast pain seem to worsen.  Pain improves with wearing a bra.  Had a cyst in breast years ago.  'it was sitting on my nerve'.  Reviewed outside records from 5/2008      Review of Systems   Constitutional, HEENT, cardiovascular, pulmonary, gi and gu systems are negative, except as otherwise noted.      Objective    /68   Pulse 93   Temp 97.8  F (36.6  C) (Tympanic)   Resp 16   Wt 73.5 kg (162 lb)   SpO2 98%   Breastfeeding No   BMI 28.70 kg/m    Body mass index is 28.7 kg/m .  Physical Exam   GENERAL APPEARANCE: healthy, alert and no distress  BREAST: normal without masses, tenderness or nipple discharge, no palpable axillary masses or adenopathy and dense breast tissue with fibrocystic changes inferior portion of breast  ABDOMEN: soft, mild LUQ pain, without hepatosplenomegaly or masses and bowel sounds normal  SKIN: no suspicious lesions or rashes

## 2021-01-15 ENCOUNTER — HEALTH MAINTENANCE LETTER (OUTPATIENT)
Age: 56
End: 2021-01-15

## 2021-03-16 ENCOUNTER — OFFICE VISIT (OUTPATIENT)
Dept: UROLOGY | Facility: CLINIC | Age: 56
End: 2021-03-16
Payer: COMMERCIAL

## 2021-03-16 VITALS — DIASTOLIC BLOOD PRESSURE: 79 MMHG | SYSTOLIC BLOOD PRESSURE: 127 MMHG | HEART RATE: 97 BPM | TEMPERATURE: 98.2 F

## 2021-03-16 DIAGNOSIS — C67.2 MALIGNANT NEOPLASM OF LATERAL WALL OF URINARY BLADDER (H): Primary | ICD-10-CM

## 2021-03-16 PROCEDURE — 88112 CYTOPATH CELL ENHANCE TECH: CPT | Performed by: PATHOLOGY

## 2021-03-16 PROCEDURE — 52000 CYSTOURETHROSCOPY: CPT | Performed by: UROLOGY

## 2021-03-16 NOTE — PROGRESS NOTES
Appointment source: Established Patient  Patient name: Sangita Montano  Urology Staff: Regis Wright MD    Subjective: This is a 56 year old year old female returning for follow up of 2013 low-grade bladder cancer.  No history of recurrence    Objective: Cystoscopy today was without evidence of neoplasm.    Plan: Repeat cystoscopy in 6 months

## 2021-03-16 NOTE — NURSING NOTE
"Initial /79 (BP Location: Right arm, Patient Position: Sitting, Cuff Size: Adult Regular)   Pulse 97   Temp 98.2  F (36.8  C) (Tympanic)  Estimated body mass index is 28.7 kg/m  as calculated from the following:    Height as of 9/15/20: 1.6 m (5' 3\").    Weight as of 1/8/21: 73.5 kg (162 lb). .    Neyda Campa MA on 3/16/2021 at 10:18 AM    "

## 2021-03-16 NOTE — NURSING NOTE
Pt brought back to the procedure room for a cystoscopy per Dr. Wright Informed consent obtained, pt prepped in a sterile manner and a uro jet was used.      Scope serial number: Ambu [id number] Exp. 4/9/2023      Neyda Campa MA on 3/16/2021 at 11:17 AM

## 2021-03-17 LAB — COPATH REPORT: NORMAL

## 2021-04-14 ENCOUNTER — MYC MEDICAL ADVICE (OUTPATIENT)
Dept: FAMILY MEDICINE | Facility: CLINIC | Age: 56
End: 2021-04-14

## 2021-04-14 NOTE — TELEPHONE ENCOUNTER
Dr. Burns,    Patient has developed a rash after 1st covid vaccine.  Please see my chart message with pictures.  Radha NEGRETE RN

## 2021-04-14 NOTE — TELEPHONE ENCOUNTER
Visit is best to determine if the rash is truly at side effects of the vaccine or something else- if due to vaccine, would report to physician database.  Would need to determine what to do if another COVID vaccine needs to be given in the future.

## 2021-04-14 NOTE — PATIENT INSTRUCTIONS
Patient Education     Medicine Reaction: Allergic  You are having an allergic reaction to a medicine you have taken. This may cause an itchy rash and sometimes swelling of various parts of the body. It could also cause trouble swallowing or breathing. The rash may take a few hours or up to 2 weeks to go away. In the future, remember to tell your healthcare provider about your allergy to this medicine so that medicines of this type won't be used again.   Any medicine can cause an allergic reaction. But most allergic reactions are caused by:     Penicillin and related medicines    Antibiotics containing sulfonamides (sulfa ,medicines)    Aspirin    Ibuprofen or other nonsteroidal anti-inflammatory drugs (NSAIDs)    Seizure medicines  Vaccines may also trigger allergies. People whose parents or siblings have allergies are at a higher risk of developing a medicine allergy. Allergy testing may sometimes be needed to figure out the cause.   Symptoms may occur within minutes, hours, or even weeks after exposure to the medicine. It can be a mild or severe reaction, or potentially life threatening. Most of us think of allergic reactions when we have a rash or itchy skin. Symptoms can include:     Rash, hives, redness, welts, blisters    Itching, burning, stinging, pain    Dry, flaky, cracking, scaly skin    Belly (abdominal) cramps or nausea or stomach pain    Fever. Sometimes fever is the only symptom of a medicine reaction. In older adults, the risk of fever increases with the number of medicines the person takes.  More severe symptoms include:    Swelling of the face or lips, or drooling    Trouble swallowing, feeling like your throat is closing    Trouble breathing, wheezing    Hoarse voice or trouble speaking    Severe nausea or vomiting or diarrhea      Feeling faint or lightheaded, rapid heart rate    Blistering of the skin or ulcers in the mouth or on the genitals  Home care    The goal of treatment is to help  relieve the symptoms and get you feeling better. Mild to medium medicine reactions usually respond quickly to taking antihistamines or steroids and stopping the medicine. The rash will usually fade over several days. But it can sometimes last a couple of weeks. Over the next couple of days, there may be times when it gets a little worse and then better again. Here are some things to do:     Dispose of the medicine safely and don t take it again. The next reaction could be the same or worse.    Call your healthcare provider to discuss adding this medicine's allergy reaction to your electronic medical record.    When getting a new medicine, always tell the healthcare provider that you are allergic to this medicine. Make certain the provider writes it down in your medical record.    Don't wear tight clothing and stay way from anything that heats up your skin (hot showers or baths, direct sunlight). Heat will make itching worse.    An ice pack will relieve local areas of intense itching and redness. To make an ice pack, put ice cubes in a plastic bag that seals at the top. Wrap the bag in a clean, thin towel or cloth. Don t put ice directly on the skin.    To help prevent an infection, don't scratch the affected area. Scratching may worsen the reaction. It can damage your skin and lead to an infection. Always check the affected site for signs of an infection.    Your provider may give you a prescription antihistamine.    If you are not given a prescription antihistamine, oral diphenhydramine is an over-the-counter antihistamine available at pharmacies and grocery stores. This may be used to reduce itching if large areas of the skin are involved. This antihistamine may make you sleepy, so be careful using it in the daytime or when going to school, working, or driving. Note: Don t use diphenhydramine if you have glaucoma or if you are a man with trouble urinating due to an enlarged prostate. There are other antihistamines  that cause less drowsiness and are a good choice for daytime use. Ask your pharmacist or healthcare provider for suggestions.    Don't use diphenhydramine cream on your skin. It can cause a worse skin reaction for some people.    Contact your healthcare provider and ask what can be used on the affected area to help decrease the itching.  Follow-up care  Follow up with your healthcare provider, or as advised, if your symptoms do not continue to improve or they get worse.   Call 911  Call 911 if any of these occur:     Shortness of breath    Cool, moist, pale skin    Swelling in the face, eyelids, mouth, tongue, or lips    Drooling    Trouble breathing or swallowing, wheezing    New or worsening swelling in the mouth, throat, or tongue    Hoarse voice or trouble speaking     Fainting or loss of consciousness    Rapid heart rate    Feeling of dizziness or weakness or a sudden drop in blood pressure    Feeling of doom    Feeling lightheaded    Severe nausea, vomiting, or diarrhea  When to seek medical advice  Call your healthcare provider or get medical care right away if any of these occur:     Continuing or recurring symptoms    Nausea, abdominal cramps, or stomach pain    Spreading areas of itching, redness, or swelling    Blistering of the skin, or sores or ulcers in the mouth or on the genitals    Signs of infection:  ? Spreading redness  ? Increased pain or swelling  ? Fever of 100.4 F (38 C) or above lasting for 24 to 48 hours, or as directed by your provider  ? Fluid or colored drainage from the affected area  Mata last reviewed this educational content on 11/1/2019 2000-2021 [employer]. All rights reserved. This information is not intended as a substitute for professional medical care. Always follow your healthcare professional's instructions.

## 2021-04-15 ENCOUNTER — OFFICE VISIT (OUTPATIENT)
Dept: FAMILY MEDICINE | Facility: CLINIC | Age: 56
End: 2021-04-15
Payer: COMMERCIAL

## 2021-04-15 VITALS
BODY MASS INDEX: 28.99 KG/M2 | TEMPERATURE: 98.5 F | WEIGHT: 163.6 LBS | HEART RATE: 89 BPM | HEIGHT: 63 IN | DIASTOLIC BLOOD PRESSURE: 70 MMHG | SYSTOLIC BLOOD PRESSURE: 128 MMHG | RESPIRATION RATE: 16 BRPM | OXYGEN SATURATION: 98 %

## 2021-04-15 DIAGNOSIS — R21 RASH AND NONSPECIFIC SKIN ERUPTION: ICD-10-CM

## 2021-04-15 DIAGNOSIS — T50.905A ADVERSE EFFECT OF DRUG, INITIAL ENCOUNTER: Primary | ICD-10-CM

## 2021-04-15 DIAGNOSIS — Z82.62 FAMILY HISTORY OF OSTEOPOROSIS: ICD-10-CM

## 2021-04-15 DIAGNOSIS — Z13.820 SCREENING FOR OSTEOPOROSIS: ICD-10-CM

## 2021-04-15 PROCEDURE — 99214 OFFICE O/P EST MOD 30 MIN: CPT | Performed by: NURSE PRACTITIONER

## 2021-04-15 ASSESSMENT — MIFFLIN-ST. JEOR: SCORE: 1296.21

## 2021-04-15 NOTE — PATIENT INSTRUCTIONS
Continue with symptomatic management of rash on chest.  If no improvement recommend following up for further evaluation.  Additional histamine blockers such as daily allergy medication like Claritin, Allegra, or Zyrtec may be helpful.    Follow-up with dermatology if lesion on top of scalp/forehead does not start to improve.    DEXA scan orders placed.  Today.

## 2021-04-15 NOTE — PROGRESS NOTES
"    Assessment & Plan     1. Adverse effect of drug, initial encounter  Presumed adverse drug reaction to COVID-19 second vaccine.  She received the Hodge now.  Discussed patient to continue with oral Benadryl.  She may also choose to utilize topical Benadryl.  Discussed H2 blockers as well such as Claritin, Allegra, or Zyrtec.  Encouraged her to continue with increased water intake.  Plan following up if symptoms worsen.    2. Rash and nonspecific skin eruption  Lesion on forehead/scalp unclear etiology recommend continue to monitor follow-up with dermatology for biopsy if no improvement.    3. Family history of osteoporosis  Referral placed for DEXA scan due to family history of osteoporosis.  - DX Hip/Pelvis/Spine; Future    4. Screening for osteoporosis  - DX Hip/Pelvis/Spine; Future        I spent a total of 31 minutes on the day of the visit.   Time spent doing chart review, history and exam, documentation and further activities per the note       BMI:   Estimated body mass index is 28.98 kg/m  as calculated from the following:    Height as of this encounter: 1.6 m (5' 3\").    Weight as of this encounter: 74.2 kg (163 lb 9.6 oz).       Patient Instructions   Continue with symptomatic management of rash on chest.  If no improvement recommend following up for further evaluation.  Additional histamine blockers such as daily allergy medication like Claritin, Allegra, or Zyrtec may be helpful.    Follow-up with dermatology if lesion on top of scalp/forehead does not start to improve.    DEXA scan orders placed.  Today.      Return in about 1 week (around 4/22/2021) for ongoing symptoms if not improving.    [facility]    Jeni Faye is a 56 year old who presents for the following health issues     HPI     Rash  Onset/Duration: Got 2nd Moderna vaccine on 4/11. Rash started under breast X 2 days ago. Then has seemed to spread under the armpits. "   Description  Location: stomach,back and underarms bilateral. And itchy at injection site-- left deltoid  Character: hot , welts and itchy  Itching: moderate today, severe yesterday  Intensity:  moderate  Progression of Symptoms:  same  Accompanying signs and symptoms:   Fever: no  Body aches or joint pain: no  Sore throat symptoms: no  Recent cold symptoms: no  History:           Previous episodes of similar rash: None  New exposures:  None  Recent travel: no  Exposure to similar rash: no  Precipitating or alleviating factors: benadryl  Therapies tried and outcome: Benadryl/diphenhydramine -  Effective. She has not used any tylenol or ibuprofen. She has been drinking lots of water.   She got her immunization in [place].     She also mentions a small spot on her forehead that she would like looked at. It has been a small red spot for a couple weeks. No other symptoms are present.     She would also like an order for a dexa scan. Mentions a family history of osteoporosis and would like to be proactive in screening. She is taking a vitamin d supplement and calcium She also assures regular activity.     Patient Active Problem List   Diagnosis     Neoplasm of uncertain behavior of skin     Bladder cancer (H)     CARDIOVASCULAR SCREENING; LDL GOAL LESS THAN 160     Hyperlipidemia with target LDL less than 130     Current Outpatient Medications   Medication Instructions     ascorbic acid (VITAMIN C) 500 MG tablet DAILY     BORAGE OIL-GLA-LINOLEIC ACID  mg GLA, 3000 mg Borage oil, 600 mg Linolenic acid, 30 mg Vitamin E     Cholecalciferol (VITAMIN D-3) 5000 UNITS TABS No dose, route, or frequency recorded.     famotidine (PEPCID) 40 mg, Oral, DAILY     fish oil-omega-3 fatty acids 1,000 mg, DAILY     MAGNESIUM GLYCINATE 100 mg     multivitamin, therapeutic (THERA-VIT) TABS tablet 1 tablet, Oral, DAILY     NEW MED Ortho Digestyme: blend of pancreatin, pepsin, bromelain, papain, betaine, taken as directed     UNABLE TO  "FIND MEDICATION NAME: Biotic 7          Review of Systems   Constitutional, HEENT, cardiovascular, pulmonary, gi and gu systems are negative, except as otherwise noted.      Objective    /70   Pulse 89   Temp 98.5  F (36.9  C) (Tympanic)   Resp 16   Ht 1.6 m (5' 3\")   Wt 74.2 kg (163 lb 9.6 oz)   SpO2 98%   BMI 28.98 kg/m    Body mass index is 28.98 kg/m .     Physical Exam   GENERAL: healthy, alert and no distress  SKIN: Rash across torso involving chest below with breasts bilaterally.  Sandpaperlike rash present with erythremia and warmth present. Small red maculopapular spot on left upper forehead without scaling.   LYMPH: no cervical, supraclavicular, axillary, or inguinal adenopathy          "

## 2021-05-20 ENCOUNTER — HOSPITAL ENCOUNTER (OUTPATIENT)
Dept: MAMMOGRAPHY | Facility: CLINIC | Age: 56
Discharge: HOME | End: 2021-05-20
Attending: OBSTETRICS & GYNECOLOGY | Admitting: OBSTETRICS & GYNECOLOGY
Payer: COMMERCIAL

## 2021-05-20 DIAGNOSIS — Z12.31 VISIT FOR SCREENING MAMMOGRAM: ICD-10-CM

## 2021-05-20 PROCEDURE — 77063 BREAST TOMOSYNTHESIS BI: CPT

## 2021-05-21 ENCOUNTER — TELEPHONE (OUTPATIENT)
Dept: OBGYN | Facility: CLINIC | Age: 56
End: 2021-05-21

## 2021-05-21 NOTE — TELEPHONE ENCOUNTER
Reason for Call:  Request for results:    Name of test or procedure: Mammogram    Date of test of procedure: 05/20/2021    Location of the test or procedure: [facility]    Pt reviewed results of mammogram on [facility] stating it is recommended she have an ultrasound. She would like to discuss these results - Contact pt     Phone number Patient can be reached at:  Home number on file [phone] (home) - ok to leave message    Additional comments: NA    Call taken on 5/21/2021 at 10:54 AM by Denise Behrendt

## 2021-05-21 NOTE — TELEPHONE ENCOUNTER
Patient is requesting to discuss results with provider. Viewed results via [employer] and has further imaging scheduled but would like to talk to provider regarding them.     BREAST DENSITY: Heterogeneously dense.     COMMENTS: There is a 1.5 cm focal asymmetry laterally in the left  breast ad 3-4:00 and at the level of the posterior breast depth. This  is approximately 5 cm off the nipple line. Further assessment with  ultrasound is recommended at this time. The right mammogram is stable                                                                   IMPRESSION: BI-RADS CATEGORY: 0 - Need Additional Imaging Evaluation  and/or Prior Mammograms for Comparison.    Thanks,    Jennifer Tai RN

## 2021-05-21 NOTE — TELEPHONE ENCOUNTER
Spoke with pt by phone and assured her that needing additional information does NOT mean she has cancer.  She is scheduled next week at [facility] for [facility]  Annetta Angel MD  [facility]

## 2021-05-25 ENCOUNTER — ANCILLARY PROCEDURE (OUTPATIENT)
Dept: MAMMOGRAPHY | Facility: CLINIC | Age: 56
End: 2021-05-25
Attending: OBSTETRICS & GYNECOLOGY
Payer: COMMERCIAL

## 2021-05-25 DIAGNOSIS — R92.8 ABNORMAL MAMMOGRAM: ICD-10-CM

## 2021-05-25 PROCEDURE — 76642 ULTRASOUND BREAST LIMITED: CPT | Mod: LT | Performed by: RADIOLOGY

## 2021-05-25 PROCEDURE — G0279 TOMOSYNTHESIS, MAMMO: HCPCS | Mod: GC | Performed by: RADIOLOGY

## 2021-05-25 PROCEDURE — 77065 DX MAMMO INCL CAD UNI: CPT | Mod: GC | Performed by: RADIOLOGY

## 2021-05-26 ENCOUNTER — RECORDS - HEALTHEAST (OUTPATIENT)
Dept: ADMINISTRATIVE | Facility: CLINIC | Age: 56
End: 2021-05-26

## 2021-05-27 ENCOUNTER — RECORDS - HEALTHEAST (OUTPATIENT)
Dept: ADMINISTRATIVE | Facility: CLINIC | Age: 56
End: 2021-05-27

## 2021-06-07 ENCOUNTER — HOSPITAL ENCOUNTER (OUTPATIENT)
Dept: BONE DENSITY | Facility: CLINIC | Age: 56
Discharge: HOME | End: 2021-06-07
Attending: NURSE PRACTITIONER | Admitting: NURSE PRACTITIONER
Payer: COMMERCIAL

## 2021-06-07 DIAGNOSIS — Z82.62 FAMILY HISTORY OF OSTEOPOROSIS: ICD-10-CM

## 2021-06-07 DIAGNOSIS — Z13.820 SCREENING FOR OSTEOPOROSIS: ICD-10-CM

## 2021-06-07 PROCEDURE — 77080 DXA BONE DENSITY AXIAL: CPT

## 2021-09-14 ENCOUNTER — OFFICE VISIT (OUTPATIENT)
Dept: UROLOGY | Facility: CLINIC | Age: 56
End: 2021-09-14
Payer: COMMERCIAL

## 2021-09-14 VITALS — HEART RATE: 90 BPM | SYSTOLIC BLOOD PRESSURE: 146 MMHG | RESPIRATION RATE: 18 BRPM | DIASTOLIC BLOOD PRESSURE: 82 MMHG

## 2021-09-14 DIAGNOSIS — C67.2 MALIGNANT NEOPLASM OF LATERAL WALL OF URINARY BLADDER (H): Primary | ICD-10-CM

## 2021-09-14 PROCEDURE — 88112 CYTOPATH CELL ENHANCE TECH: CPT | Performed by: PATHOLOGY

## 2021-09-14 PROCEDURE — 52000 CYSTOURETHROSCOPY: CPT | Performed by: UROLOGY

## 2021-09-14 NOTE — NURSING NOTE
"Chief Complaint   Patient presents with     Cystoscopy       Initial BP (!) 146/82 (BP Location: Right arm, Patient Position: Chair, Cuff Size: Adult Regular)   Pulse 90   Resp 18  Estimated body mass index is 28.98 kg/m  as calculated from the following:    Height as of 4/15/21: 1.6 m (5' 3\").    Weight as of 4/15/21: 74.2 kg (163 lb 9.6 oz).  BP completed using cuff size: regular  Medications and allergies reviewed.    Pt brought back to the procedure room for a cystoscopy per Dr. Wright. Informed consent obtained, pt prepped in a sterile manner and a uro jet was used.        Shahana WILLIAM CMA     "

## 2021-09-14 NOTE — PROGRESS NOTES
Appointment source: Established Patient  Patient name: Sangita Montano  Urology Staff: Regis Wright MD    Subjective: This is a 56 year old year old female returning for follow up of low-grade bladder cancer diagnosed in 2013.  No history of recurrence    Objective: Cystoscopy today was without evidence of neoplasm    Assessment: No evidence of recurrence of bladder cancer    Plan: Turn in 6 months for routine cystoscopy    Cytology sent.

## 2021-09-14 NOTE — PATIENT INSTRUCTIONS
Per physician instructions.    If you have questions or concerns on any instructions given to you by your provider today or if you need to schedule an appointment, you can reach us at [phone].  Listen to the menu for the Specialty Clinic option.      Thank you!

## 2021-09-15 LAB
PATH REPORT.COMMENTS IMP SPEC: NORMAL
PATH REPORT.FINAL DX SPEC: NORMAL
PATH REPORT.GROSS SPEC: NORMAL
PATH REPORT.RELEVANT HX SPEC: NORMAL

## 2021-09-18 ENCOUNTER — HEALTH MAINTENANCE LETTER (OUTPATIENT)
Age: 56
End: 2021-09-18

## 2021-10-08 ENCOUNTER — OFFICE VISIT (OUTPATIENT)
Dept: OBGYN | Facility: CLINIC | Age: 56
End: 2021-10-08
Payer: COMMERCIAL

## 2021-10-08 VITALS
TEMPERATURE: 98.7 F | DIASTOLIC BLOOD PRESSURE: 81 MMHG | WEIGHT: 172.7 LBS | BODY MASS INDEX: 30.6 KG/M2 | HEIGHT: 63 IN | HEART RATE: 98 BPM | RESPIRATION RATE: 12 BRPM | SYSTOLIC BLOOD PRESSURE: 142 MMHG

## 2021-10-08 DIAGNOSIS — N89.8 VAGINAL DISCHARGE: Primary | ICD-10-CM

## 2021-10-08 DIAGNOSIS — N95.0 POST-MENOPAUSAL BLEEDING: ICD-10-CM

## 2021-10-08 LAB
CLUE CELLS: NORMAL
TRICHOMONAS, WET PREP: NORMAL
WBC'S/HIGH POWER FIELD, WET PREP: NORMAL
YEAST, WET PREP: NORMAL

## 2021-10-08 PROCEDURE — 87210 SMEAR WET MOUNT SALINE/INK: CPT | Performed by: OBSTETRICS & GYNECOLOGY

## 2021-10-08 PROCEDURE — 99213 OFFICE O/P EST LOW 20 MIN: CPT | Performed by: OBSTETRICS & GYNECOLOGY

## 2021-10-08 RX ORDER — CETIRIZINE HYDROCHLORIDE 10 MG/1
10 TABLET ORAL DAILY
COMMUNITY
End: 2023-03-06

## 2021-10-08 ASSESSMENT — MIFFLIN-ST. JEOR: SCORE: 1337.49

## 2021-10-08 NOTE — NURSING NOTE
"Initial BP (!) 142/81 (BP Location: Left arm, Patient Position: Sitting, Cuff Size: Adult Regular)   Pulse 98   Temp 98.7  F (37.1  C) (Tympanic)   Resp 12   Ht 1.6 m (5' 3\")   Wt 78.3 kg (172 lb 11.2 oz)   LMP 10/28/2019   BMI 30.59 kg/m   Estimated body mass index is 30.59 kg/m  as calculated from the following:    Height as of this encounter: 1.6 m (5' 3\").    Weight as of this encounter: 78.3 kg (172 lb 11.2 oz). .      "

## 2021-10-08 NOTE — PROGRESS NOTES
"[facility]    Gynecology Office Note    CC:   Chief Complaint   Patient presents with     Consult        HPI: Sangita Montano is a 56 year old who presents for vaginal discharge. Patient reports 3 month history of intermittent vaginal discharge. Reports discharge is mucousy in nature. No associated vaginal burning, itching, or irritation. When this started in August, it felt like she was going to get a menses, even though her last menses was February 2020. Had associated cramping, breast tenderness, and PMS symptoms. Did note a streak of blood in the mucous discharge this week. Discharge stopped after about 3 weeks. Then the discharge returned a month later and has now again persistent since the end of September. No bleeding or other associated symptoms this time. She has tried a natural antibiotic vaginally with some intermittent improvement of symptoms. Patient does note that the onset of discharge in August corresponds to being treated with a \"natural antibiotic\" from her nutritionist, which she was given while being evaluated for small bowel bacterial overgrowth.     GYN Hx:     Patient is menopausal: yes  Menopause since February 2020 last menses.     Patient's last menstrual period was 10/28/2019.  Menarche: 15 years old  Last Pap Smear:   Lab Results   Component Value Date    PAP NIL 11/21/2019   HPV negative    Sexual Activity: currently not sexually active       ROS: A 10 pt ROS was completed and found to be otherwise negative unless mentioned in the HPI.     PMH:   Past Medical History:   Diagnosis Date     Bladder cancer (H)     7/2013       PSHx:   Past Surgical History:   Procedure Laterality Date     COLONOSCOPY N/A 3/2/2016    Procedure: COLONOSCOPY;  Surgeon: Bala Pineda MD;  Location: [facility]     CYSTOSCOPY, TRANSURETHRAL RESECTION (TUR) PROSTATE, COMBINED  7/3/2013    Procedure: COMBINED CYSTOSCOPY, TRANSURETHRAL RESECTION (TUR) PROSTATE;  Transurethral " Resection of Bladder Cancer;  Surgeon: DONG Wright MD;  Location: [facility]     EYE SURGERY         OBHx:   OB History    Para Term  AB Living   0 0 0 0 0 0   SAB TAB Ectopic Multiple Live Births   0 0 0 0 0       Medications:   BORAGE OIL-GLA-LINOLEIC ACID PO, 200 mg GLA, 3000 mg Borage oil, 600 mg Linolenic acid, 30 mg Vitamin E  cetirizine (ZYRTEC) 10 MG tablet, Take 10 mg by mouth daily  Cholecalciferol (VITAMIN D-3) 5000 UNITS TABS,   fish oil-omega-3 fatty acids (OMEGA 3) 1000 MG capsule, Take 1,000 mg by mouth daily 3 tablets daily  MAGNESIUM GLYCINATE, 100 mg qd  multivitamin, therapeutic (THERA-VIT) TABS tablet, Take 1 tablet by mouth daily  NEW MED, Ortho Digestyme: blend of pancreatin, pepsin, bromelain, papain, betaine, taken as directed  UNABLE TO FIND, MEDICATION NAME: Biotic 7  ascorbic acid (VITAMIN C) 500 MG tablet, Take by mouth daily 3941-3453 mg daily (Patient not taking: Reported on 10/8/2021)  famotidine (PEPCID) 40 MG tablet, Take 1 tablet (40 mg) by mouth daily (Patient not taking: Reported on 10/8/2021)    No current facility-administered medications on file prior to visit.      Allergies:      Allergies   Allergen Reactions     Penicillins Hives     Sulfa Drugs Hives       Social History:   Social History     Socioeconomic History     Marital status: Single     Spouse name: Not on file     Number of children: Not on file     Years of education: Not on file     Highest education level: Not on file   Occupational History     Not on file   Tobacco Use     Smoking status: Never Smoker     Smokeless tobacco: Never Used   Substance and Sexual Activity     Alcohol use: No     Drug use: No     Sexual activity: Never   Other Topics Concern     Parent/sibling w/ CABG, MI or angioplasty before 65F 55M? Yes     Comment: Brother 46 and Father 37   Social History Narrative     Not on file     Social Determinants of Health     Financial Resource Strain:      Difficulty of Paying Living  "Expenses:    Food Insecurity:      Worried About Running Out of Food in the Last Year:      Ran Out of Food in the Last Year:    Transportation Needs:      Lack of Transportation (Medical):      Lack of Transportation (Non-Medical):    Physical Activity:      Days of Exercise per Week:      Minutes of Exercise per Session:    Stress:      Feeling of Stress :    Social Connections:      Frequency of Communication with Friends and Family:      Frequency of Social Gatherings with Friends and Family:      Attends [employer] Services:      Active Member of Clubs or Organizations:      Attends Club or Organization Meetings:      Marital Status:    Intimate Partner Violence:      Fear of Current or Ex-Partner:      Emotionally Abused:      Physically Abused:      Sexually Abused:          Family History:   Family History   Problem Relation Age of Onset     Cerebrovascular Disease Mother      Hypertension Father      Cancer Father         bladder cancer,  liver cancer     Heart Disease Father      Heart Disease Brother 46        MI     Breast Cancer No family hx of        Physical Exam:   Vitals:    10/08/21 1035 10/08/21 1037   BP: (!) 149/81 (!) 142/81   BP Location: Left arm Left arm   Patient Position: Sitting Sitting   Cuff Size: Adult Regular Adult Regular   Pulse: 98    Resp: 12    Temp: 98.7  F (37.1  C)    TempSrc: Tympanic    Weight: 78.3 kg (172 lb 11.2 oz)    Height: 1.6 m (5' 3\")       Estimated body mass index is 30.59 kg/m  as calculated from the following:    Height as of this encounter: 1.6 m (5' 3\").    Weight as of this encounter: 78.3 kg (172 lb 11.2 oz).    General appearance: well-hydrated, A&O x 3, no apparent distress  Lungs: Equal expansion bilaterally, no accessory muscle use  Heart: No heaves or thrills. No peripheral varicosities  Constitutional: See vitals  Abdomen: Soft, non-tender, non-distended.   Extremities: no edema  Neuro: CN II-XII grossly intact  Genitourinary:  External genitalia: no " erythema, no lesions. Enlarged labia minora.  Urethral meatus appropriate location without lesions or prolapse  Urethra: No masses, tenderness, or scarring  Bladder no fullness, masses, or tenderness.  Anus and Perineum: Unremarkable, no visible lesions  Vagina: Normal, healthy pink mucosa without any lesions. Clear mucousy vaginal discharge.   Cervix: normal appearance, no cervical motion tenderness.   Uterus: normal size, shape and consistency.   Adnexa: no masses or tenderness bilaterally.    Assessment and Plan:     Encounter Diagnoses   Name Primary?     Vaginal discharge Yes     Post-menopausal bleeding      Wet prep collected for vaginal discharge today. Also discussed episode of post menopausal bleeding in August. Discussed that any bleeding after menopause is not normal and warrants evaluation of the endometrium. Also there is a chance the abnormal discharge could be due to uterine pathology as well. Offered endometrial biopsy today. Patient has had these before and they have been normal, she patient declines biopsy today. Recommended pelvic ultrasound for evaluation of endometrial stripe if she doesn't want biopsy today. Patient is agreeable and order given. Plan for biopsy if thickened endometrium.     Health maintenance: pap smear up to date.    Return to clinic as needed based on results.       Yu Strong,

## 2021-10-11 ENCOUNTER — HOSPITAL ENCOUNTER (OUTPATIENT)
Dept: ULTRASOUND IMAGING | Facility: CLINIC | Age: 56
Discharge: HOME | End: 2021-10-11
Attending: OBSTETRICS & GYNECOLOGY | Admitting: OBSTETRICS & GYNECOLOGY
Payer: COMMERCIAL

## 2021-10-11 DIAGNOSIS — N95.0 POST-MENOPAUSAL BLEEDING: ICD-10-CM

## 2021-10-11 PROCEDURE — 76830 TRANSVAGINAL US NON-OB: CPT

## 2021-12-21 ENCOUNTER — VIRTUAL VISIT (OUTPATIENT)
Dept: FAMILY MEDICINE | Facility: CLINIC | Age: 56
End: 2021-12-21
Payer: COMMERCIAL

## 2021-12-21 ENCOUNTER — OFFICE VISIT (OUTPATIENT)
Dept: OBGYN | Facility: CLINIC | Age: 56
End: 2021-12-21
Payer: COMMERCIAL

## 2021-12-21 VITALS
WEIGHT: 172.4 LBS | SYSTOLIC BLOOD PRESSURE: 136 MMHG | TEMPERATURE: 99.4 F | BODY MASS INDEX: 30.55 KG/M2 | HEIGHT: 63 IN | RESPIRATION RATE: 12 BRPM | HEART RATE: 94 BPM | DIASTOLIC BLOOD PRESSURE: 79 MMHG

## 2021-12-21 DIAGNOSIS — Z78.0 MENOPAUSE: ICD-10-CM

## 2021-12-21 DIAGNOSIS — Z87.2 HISTORY OF DRUG RASH: Primary | ICD-10-CM

## 2021-12-21 DIAGNOSIS — N83.202 LEFT OVARIAN CYST: Primary | ICD-10-CM

## 2021-12-21 PROCEDURE — 99213 OFFICE O/P EST LOW 20 MIN: CPT | Mod: 95 | Performed by: NURSE PRACTITIONER

## 2021-12-21 PROCEDURE — 99214 OFFICE O/P EST MOD 30 MIN: CPT | Performed by: OBSTETRICS & GYNECOLOGY

## 2021-12-21 ASSESSMENT — MIFFLIN-ST. JEOR: SCORE: 1336.13

## 2021-12-21 NOTE — NURSING NOTE
".Initial /79 (BP Location: Right arm, Patient Position: Sitting, Cuff Size: Adult Regular)   Pulse 94   Temp 99.4  F (37.4  C) (Tympanic)   Resp 12   Ht 1.6 m (5' 3\")   Wt 78.2 kg (172 lb 6.4 oz)   LMP 10/28/2019   BMI 30.54 kg/m   Estimated body mass index is 30.54 kg/m  as calculated from the following:    Height as of this encounter: 1.6 m (5' 3\").    Weight as of this encounter: 78.2 kg (172 lb 6.4 oz). .        "

## 2021-12-21 NOTE — PROGRESS NOTES
"Neyda is a 56 year old who is being evaluated via a billable video visit.      How would you like to obtain your AVS? MyChart  If the video visit is dropped, the invitation should be resent by: Send to e-mail at: [email or web address]  Will anyone else be joining your video visit? No      Video Start Time: 3:32 PM    Assessment & Plan     History of drug rash  Questionable drug rash versus immune response rash.  Patient has concerns regarding receiving booster immunization for Covid.  Referral placed to asthma and allergy for further discussion on if she should receive a booster dose or not based on previous reaction.  - Adult Allergy/Asthma Referral; Future    BMI:   Estimated body mass index is 30.54 kg/m  as calculated from the following:    Height as of an earlier encounter on 12/21/21: 1.6 m (5' 3\").    Weight as of an earlier encounter on 12/21/21: 78.2 kg (172 lb 6.4 oz).       Return if symptoms worsen or fail to improve.    [facility]    Subjective   Neyda is a 56 year old who presents for the following health issues     HPI   Chief Complaint   Patient presents with     RECHECK     regaurding covid vaccine booster pt had a reaction wants discuss this      Has questions if she should receive a booster or not for moderna. 2nd vaccine she had a rash from her chin to her hips. She has a history of hyperreaction/ strong immune responses to immunizations.  She is concerned about receiving her booster shot and potential reaction she may experience.  She is trying to be proactive to determine if she should get a medical exemption for the immunization.  She works for the [employer] as of right now the booster dose has not been required but she is not sure if this is something that may be required in the future.  She currently is otherwise feeling well.      Review of Systems   Constitutional, HEENT, cardiovascular, pulmonary, gi and gu systems are negative, " except as otherwise noted.      Objective           Vitals:  No vitals were obtained today due to virtual visit.    Physical Exam   GENERAL: Healthy, alert and no distress  EYES: Eyes grossly normal to inspection.  No discharge or erythema, or obvious scleral/conjunctival abnormalities.  RESP: No audible wheeze, cough, or visible cyanosis.  No visible retractions or increased work of breathing.    SKIN: Visible skin clear. No significant rash, abnormal pigmentation or lesions.  NEURO: Cranial nerves grossly intact.  Mentation and speech appropriate for age.  PSYCH: Mentation appears normal, affect normal/bright, judgement and insight intact, normal speech and appearance well-groomed.                Video-Visit Details    Type of service:  Video Visit    Video End Time:3:52 PM    Originating Location (pt. Location): Home    Distant Location (provider location):  [facility]     Platform used for Video Visit: Launch[employer]

## 2021-12-21 NOTE — PROGRESS NOTES
[facility]    Gynecology Office Note    CC:   Chief Complaint   Patient presents with     Follow Up        HPI: Sangita Montano is a 56 year old  who presents for follow up. Patient was seen 2 months ago for vaginal discharge and episode of post menopausal bleeding. Patient deferred EMB at that time. Instead had ultrasound performed. Here to discuss results and follow up. Reports her symptoms have resolved. No abnormal vaginal discharge or further episodes of vaginal bleeding. Also discussed menopausal symptoms. Hot flashes are improving. Is starting to notice some vaginal dryness but not too bothersome.     GYN Hx:     Patient is menopausal: yes  Menopause since . Denies HRT use.    Patient's last menstrual period was 10/28/2019.    Last Pap Smear:   Lab Results   Component Value Date    PAP NIL 2019     Not currently sexually active.     ROS: A 10 pt ROS was completed and found to be otherwise negative unless mentioned in the HPI.     PMH:   Past Medical History:   Diagnosis Date     Bladder cancer (H)     2013       PSHx:   Past Surgical History:   Procedure Laterality Date     COLONOSCOPY N/A 3/2/2016    Procedure: COLONOSCOPY;  Surgeon: Bala Pineda MD;  Location: [facility]     CYSTOSCOPY, TRANSURETHRAL RESECTION (TUR) PROSTATE, COMBINED  7/3/2013    Procedure: COMBINED CYSTOSCOPY, TRANSURETHRAL RESECTION (TUR) PROSTATE;  Transurethral Resection of Bladder Cancer;  Surgeon: DONG Wright MD;  Location: [facility]     EYE SURGERY         OBHx:   OB History    Para Term  AB Living   0 0 0 0 0 0   SAB IAB Ectopic Multiple Live Births   0 0 0 0 0       Medications:   ascorbic acid (VITAMIN C) 500 MG tablet, Take by mouth daily 6815-0595 mg daily  BORAGE OIL-GLA-LINOLEIC ACID PO, 200 mg GLA, 3000 mg Borage oil, 600 mg Linolenic acid, 30 mg Vitamin E  cetirizine (ZYRTEC) 10 MG tablet, Take 10 mg by mouth daily  Cholecalciferol (VITAMIN D-3) 5000  UNITS TABS,   fish oil-omega-3 fatty acids (OMEGA 3) 1000 MG capsule, Take 1,000 mg by mouth daily 3 tablets daily  MAGNESIUM GLYCINATE, 100 mg qd  multivitamin, therapeutic (THERA-VIT) TABS tablet, Take 1 tablet by mouth daily  NEW MED, Ortho Digestyme: blend of pancreatin, pepsin, bromelain, papain, betaine, taken as directed  UNABLE TO FIND, MEDICATION NAME: Biotic 7  famotidine (PEPCID) 40 MG tablet, Take 1 tablet (40 mg) by mouth daily (Patient not taking: Reported on 10/8/2021)    No current facility-administered medications on file prior to visit.      Allergies:      Allergies   Allergen Reactions     Other Environmental Allergy Other (See Comments)     Airborne pollen: Runny nose, sneezing, and fatigue.  Processed meat (only tolerates fresh meat): Rash and migraine  Fermented foods: Rash and migraine  Vinegar: Rash and migraine     Penicillins Hives     Pollen Extract-Tree Extract [Pollen Extract] Other (See Comments)     Sneezing, fatigue, and runny nose     Sulfa Drugs Hives     Other Food Allergy Rash     migraine     Solanum Lycopersicum [Tomato] Rash     Migraine       Social History:   Social History     Socioeconomic History     Marital status: Single     Spouse name: Not on file     Number of children: Not on file     Years of education: Not on file     Highest education level: Not on file   Occupational History     Not on file   Tobacco Use     Smoking status: Never Smoker     Smokeless tobacco: Never Used   Substance and Sexual Activity     Alcohol use: No     Drug use: No     Sexual activity: Never   Other Topics Concern     Parent/sibling w/ CABG, MI or angioplasty before 65F 55M? Yes     Comment: Brother 46 and Father 37   Social History Narrative     Not on file     Social Determinants of Health     Financial Resource Strain: Not on file   Food Insecurity: Not on file   Transportation Needs: Not on file   Physical Activity: Not on file   Stress: Not on file   Social Connections: Not on file  "  Intimate Partner Violence: Not on file   Housing Stability: Not on file         Family History:   Family History   Problem Relation Age of Onset     Cerebrovascular Disease Mother      Hypertension Father      Cancer Father         bladder cancer,  liver cancer     Heart Disease Father      Heart Disease Brother 46        MI     Breast Cancer No family hx of        Physical Exam:   Vitals:    12/21/21 1254   BP: 136/79   BP Location: Right arm   Patient Position: Sitting   Cuff Size: Adult Regular   Pulse: 94   Resp: 12   Temp: 99.4  F (37.4  C)   TempSrc: Tympanic   Weight: 78.2 kg (172 lb 6.4 oz)   Height: 1.6 m (5' 3\")      Estimated body mass index is 30.54 kg/m  as calculated from the following:    Height as of this encounter: 1.6 m (5' 3\").    Weight as of this encounter: 78.2 kg (172 lb 6.4 oz).    General appearance: well-hydrated, A&O x 3, no apparent distress  Lungs: Equal expansion bilaterally, no accessory muscle use  Heart: No heaves or thrills.   Constitutional: See vitals  Neuro: CN II-XII grossly intact    Labs/Imaging: Reviewed pelvic US from 10/2021. Uterus normal, endometrial stripe thin 3mm and smooth. Simple appearing cyst in the left ovary, 1.8cm, no septations. Reviewed pelvic imaging back to 2015 with mention of 2cm cyst on the left ovary.     Assessment and Plan:     Encounter Diagnoses   Name Primary?     Left ovarian cyst Yes     Menopause      Patient here for follow up on vaginal discharge and post menopausal bleeding. Pelvic US reassuring with thin endometrium, will continue to defer EMB for now without recurrence of symptoms. Discussed that this would be recommended if PMB or continuous discharge were to return.     Incidental finding of left ovarian cyst on pelvic US. Cyst is simple appearing and <2cm. Seems to be stable in appearance from finding on CT scan in 2015. Plan to continue to montior for now. Would consider repeat pelvic US at next annual to ensure stability of ovarian " cyst. Discussed symptoms to monitor for earlier return and reevaluation.     Also discussed menopausal symptoms. Patient having more vaginal dryness. Discussed treatment options including vaginal moisturizers or vaginal estrogen. Patient reports symptoms are not too bothersome at this point and will just continue to monitor.       Return to clinic in 1 year or sooner if needed.    Yu Strong DO    35 minutes spent on the date of the encounter doing chart review, review of test results, interpretation of tests, patient visit and documentation.

## 2022-02-21 ENCOUNTER — TELEPHONE (OUTPATIENT)
Dept: UROLOGY | Facility: CLINIC | Age: 57
End: 2022-02-21
Payer: COMMERCIAL

## 2022-02-21 NOTE — TELEPHONE ENCOUNTER
Spoke to pt and reviewed his next avail appt with enough time for Cysto.    Did let pt know that because she is a survelliance cysto- and no Bx or any other additional things to make for a longer appt-  She could be more easily fit in for acysto sooner than schedule reflects next avail.  Advised to wait and if needs to reschedule to leave a message for care team / RN and mention the fact that she is a quicker Cysto... we can look for somewhere to fit her in.    Saba NAPOLES   Specialty Clinic RN

## 2022-02-21 NOTE — TELEPHONE ENCOUNTER
Reason for Call:  Other appointment    Detailed comments: Patient would like a call back to discuss possibly rescheduling her appointment for tomorrow's procedure due to the weather. Please call.     Phone Number Patient can be reached at: Home number on file [phone] (home)    Best Time: any    Can we leave a detailed message on this number? YES    Call taken on 2/21/2022 at 9:37 AM by Alexa Turcios

## 2022-02-22 ENCOUNTER — OFFICE VISIT (OUTPATIENT)
Dept: UROLOGY | Facility: CLINIC | Age: 57
End: 2022-02-22
Payer: COMMERCIAL

## 2022-02-22 VITALS — HEART RATE: 99 BPM | DIASTOLIC BLOOD PRESSURE: 87 MMHG | SYSTOLIC BLOOD PRESSURE: 147 MMHG | OXYGEN SATURATION: 96 %

## 2022-02-22 DIAGNOSIS — Z85.51 PERSONAL HISTORY OF MALIGNANT NEOPLASM OF BLADDER: Primary | ICD-10-CM

## 2022-02-22 PROCEDURE — 88112 CYTOPATH CELL ENHANCE TECH: CPT | Performed by: PATHOLOGY

## 2022-02-22 PROCEDURE — 52000 CYSTOURETHROSCOPY: CPT | Performed by: UROLOGY

## 2022-02-22 NOTE — NURSING NOTE
Pt brought back to the procedure room for a cystoscopy per Dr. Wright. Informed consent obtained, pt prepped in a sterile manner and a uro jet was used.      Shahana WILLIAM CMA

## 2022-02-22 NOTE — PROGRESS NOTES
Appointment source: Established Patient  Patient name: Sangita Montano  Urology Staff: Regis Wright MD    Subjective: This is a 57 year old year old female returning for follow up of low-grade bladder cancer.    Diagnosed in 2013 as low-grade noninvasive bladder cancer.    Underwent 1 course of BCG therapy the same year.    No recurrences.    Objective: Cystoscopy today revealed no evidence of recurrent bladder cancer    Assessment: History of low-grade noninvasive bladder cancer and 1 course of BCG therapy without recurrence.    Plan: Return visit in 6 months for cystoscopy

## 2022-02-22 NOTE — NURSING NOTE
"Chief Complaint   Patient presents with     Cystoscopy       Initial BP (!) 147/87 (BP Location: Left arm, Patient Position: Chair, Cuff Size: Adult Regular)   Pulse 99   LMP 10/28/2019   SpO2 96%  Estimated body mass index is 30.54 kg/m  as calculated from the following:    Height as of 12/21/21: 1.6 m (5' 3\").    Weight as of 12/21/21: 78.2 kg (172 lb 6.4 oz).  BP completed using cuff size: regular   Medications and allergies reviewed.      Shahana WILLIAM CMA     "

## 2022-02-22 NOTE — PATIENT INSTRUCTIONS
Per physician instructions.    If you have questions or concerns on any instructions given to you by your provider today or if you need to schedule an appointment, you can reach us at [phone].  Listen to the menu for the Specialty Clinic option.      Thank you!

## 2022-02-23 LAB
PATH REPORT.COMMENTS IMP SPEC: NORMAL
PATH REPORT.FINAL DX SPEC: NORMAL
PATH REPORT.GROSS SPEC: NORMAL
PATH REPORT.MICROSCOPIC SPEC OTHER STN: NORMAL
PATH REPORT.RELEVANT HX SPEC: NORMAL

## 2022-02-27 ENCOUNTER — HEALTH MAINTENANCE LETTER (OUTPATIENT)
Age: 57
End: 2022-02-27

## 2022-04-07 ENCOUNTER — LAB (OUTPATIENT)
Dept: LAB | Facility: CLINIC | Age: 57
End: 2022-04-07
Payer: COMMERCIAL

## 2022-04-07 DIAGNOSIS — R76.8 THYROID ANTIBODY POSITIVE: ICD-10-CM

## 2022-04-07 DIAGNOSIS — R76.8 THYROID ANTIBODY POSITIVE: Primary | ICD-10-CM

## 2022-04-07 LAB
T4 FREE SERPL-MCNC: 0.92 NG/DL (ref 0.76–1.46)
TSH SERPL DL<=0.005 MIU/L-ACNC: 0.95 MU/L (ref 0.4–4)

## 2022-04-07 PROCEDURE — 36415 COLL VENOUS BLD VENIPUNCTURE: CPT

## 2022-04-07 PROCEDURE — 84443 ASSAY THYROID STIM HORMONE: CPT

## 2022-04-07 PROCEDURE — 84439 ASSAY OF FREE THYROXINE: CPT

## 2022-04-07 PROCEDURE — 86800 THYROGLOBULIN ANTIBODY: CPT

## 2022-04-07 PROCEDURE — 84481 FREE ASSAY (FT-3): CPT

## 2022-04-07 PROCEDURE — 86376 MICROSOMAL ANTIBODY EACH: CPT

## 2022-04-08 LAB
T3FREE SERPL-MCNC: 2.8 PG/ML (ref 2.3–4.2)
THYROGLOB AB SERPL IA-ACNC: <20 IU/ML
THYROPEROXIDASE AB SERPL-ACNC: 66 IU/ML

## 2022-05-04 ENCOUNTER — NURSE TRIAGE (OUTPATIENT)
Dept: FAMILY MEDICINE | Facility: CLINIC | Age: 57
End: 2022-05-04
Payer: COMMERCIAL

## 2022-05-04 NOTE — TELEPHONE ENCOUNTER
Patient has has dizziness.lightheadedness ongoing for about 4 weeks now, she has expereinced this 1 other time in her 20s. She is nauseas until she straightens herself out. She feels a little unsteady during the initial position change then is okay and able to walk normally. She has been trying the epley maneuver but no luck in feeling better. RN was able to get her an appointment to see her Provider tomorrow at 1:20pm to have this addressed. Her right ear does feel a little full and possibly some fluid in there as well. Unsure of what is causing this. The room is spinning is how she has described it.     Maribell Oliver RN BSN PHN      Reason for Disposition    [1] MILD dizziness (e.g., vertigo; walking normally) AND [2] has NOT been evaluated by physician for this    Additional Information    Negative: [1] MODERATE dizziness (e.g., vertigo; feels very unsteady, interferes with normal activities) AND [2] has NOT been evaluated by physician for this    Negative: Earache    Negative: Vomiting occurs with dizziness    Negative: [1] MODERATE dizziness (e.g., vertigo; feels very unsteady, interferes with normal activities) AND [2] has been evaluated by physician for this    Negative: Taking a medicine that could cause dizziness (e.g., phenytoin [Dilantin], carbamazepine [Tegretol], primidone [Mysoline])    Negative: SEVERE dizziness (vertigo) (e.g., unable to walk without assistance)    Negative: [1] Dizziness (vertigo) present now AND [2] one or more stroke risk factors (i.e., hypertension, diabetes, prior stroke/TIA, heart attack)  (Exception: prior physician evaluation for this AND no different/worse than usual)    Negative: [1] Dizziness (vertigo) present now AND [2] age > 60  (Exception: prior physician evaluation for this AND no different/worse than usual)    Negative: Severe headache (e.g., excruciating)  (Exception: similar to previous migraines)    Negative: Patient sounds very sick or weak to the triager     Negative: Followed a head injury    Negative: Followed an ear injury    Negative: Localized weakness or numbness is main symptom    Negative: Dizziness relates to riding in a car, going to an amusement park, etc.    Negative: [1] Dizziness is main symptom AND [2] NO spinning sensation (i.e., vertigo)    Negative: [1] Weakness (i.e., paralysis, loss of muscle strength) of the face, arm or leg on one side of the body AND [2] sudden onset AND [3] present now    Negative: [1] Numbness (i.e., loss of sensation) of the face, arm or leg on one side of the body AND [2] sudden onset AND [3] present now    Negative: [1] Loss of speech or garbled speech AND [2] sudden onset AND [3] present now    Negative: Difficult to awaken or acting confused (e.g., disoriented, slurred speech)    Negative: Sounds like a life-threatening emergency to the triager    Protocols used: DIZZINESS - VERTIGO-A-

## 2022-05-05 ENCOUNTER — OFFICE VISIT (OUTPATIENT)
Dept: FAMILY MEDICINE | Facility: CLINIC | Age: 57
End: 2022-05-05
Payer: COMMERCIAL

## 2022-05-05 VITALS
SYSTOLIC BLOOD PRESSURE: 138 MMHG | HEART RATE: 91 BPM | WEIGHT: 179 LBS | DIASTOLIC BLOOD PRESSURE: 72 MMHG | HEIGHT: 63 IN | RESPIRATION RATE: 14 BRPM | TEMPERATURE: 98.4 F | OXYGEN SATURATION: 98 % | BODY MASS INDEX: 31.71 KG/M2

## 2022-05-05 DIAGNOSIS — Z11.4 SCREENING FOR HIV (HUMAN IMMUNODEFICIENCY VIRUS): ICD-10-CM

## 2022-05-05 DIAGNOSIS — H81.13 BENIGN PAROXYSMAL POSITIONAL VERTIGO DUE TO BILATERAL VESTIBULAR DISORDER: Primary | ICD-10-CM

## 2022-05-05 DIAGNOSIS — F45.8 GRINDING TEETH: ICD-10-CM

## 2022-05-05 DIAGNOSIS — C67.2 MALIGNANT NEOPLASM OF LATERAL WALL OF URINARY BLADDER (H): ICD-10-CM

## 2022-05-05 DIAGNOSIS — Z12.31 VISIT FOR SCREENING MAMMOGRAM: ICD-10-CM

## 2022-05-05 DIAGNOSIS — R53.83 FATIGUE, UNSPECIFIED TYPE: ICD-10-CM

## 2022-05-05 DIAGNOSIS — Z11.59 NEED FOR HEPATITIS C SCREENING TEST: ICD-10-CM

## 2022-05-05 PROCEDURE — 99214 OFFICE O/P EST MOD 30 MIN: CPT | Performed by: NURSE PRACTITIONER

## 2022-05-05 RX ORDER — MECLIZINE HYDROCHLORIDE 25 MG/1
25 TABLET ORAL 3 TIMES DAILY PRN
Qty: 30 TABLET | Refills: 0 | Status: SHIPPED | OUTPATIENT
Start: 2022-05-05 | End: 2023-03-06

## 2022-05-05 ASSESSMENT — PAIN SCALES - GENERAL: PAINLEVEL: NO PAIN (0)

## 2022-05-05 NOTE — PROGRESS NOTES
"  Assessment & Plan     Benign paroxysmal positional vertigo due to bilateral vestibular disorder  BPPV.  Occupational therapy referral placed for Epley maneuver.  Meclizine prescribed today.  Discussed Epley maneuver with patient provided education.  Also discussed option of trying a half somersault maneuver but discussed with patient this is limited by ability to be down on the ground.  She expresses understanding.  Follow-up if symptoms are worsening or changing.  - REVIEW OF HEALTH MAINTENANCE PROTOCOL ORDERS  - meclizine (ANTIVERT) 25 MG tablet; Take 1 tablet (25 mg) by mouth 3 times daily as needed for dizziness  - Occupational Therapy Referral; Future    Grinding teeth  Fatigue, unspecified type  Continue to follow-up with dentist for mouthguard.  Referral placed to sleep apnea for concerns of possible NELLIE as this was expressed by dentist.   - Adult Sleep Eval & Management  Referral; Future    Malignant neoplasm of lateral wall of urinary bladder (H)  Following with Dr. Wright.     Screening for HIV (human immunodeficiency virus)  - HIV Antigen Antibody Combo; Future    Need for hepatitis C screening test  - Hepatitis C Screen Reflex to HCV RNA Quant and Genotype; Future    Visit for screening mammogram  Order placed.   - MA SCREENING DIGITAL BILAT - Future  (s+30); Future             BMI:   Estimated body mass index is 31.71 kg/m  as calculated from the following:    Height as of this encounter: 1.6 m (5' 3\").    Weight as of this encounter: 81.2 kg (179 lb).           Return in about 1 week (around 5/12/2022) for ongoing symptoms if not improving.    THA Varma CNP  [facility]    Jeni Faye is a 57 year old who presents for the following health issues     History of Present Illness       Reason for visit:  Vertigo  Symptom onset:  3-4 weeks ago  Symptom intensity:  Moderate  Symptom progression:  Staying the same  Had these symptoms before:  Yes  Has " "tried/received treatment for these symptoms:  Yes  Previous treatment was successful:  Yes  Prior treatment description:  They put me on a table and moved my head  What makes it worse:  Lying down bending over    She eats 4 or more servings of fruits and vegetables daily.She consumes 0 sweetened beverage(s) daily.She exercises with enough effort to increase her heart rate 10 to 19 minutes per day.  She exercises with enough effort to increase her heart rate 3 or less days per week.   She is taking medications regularly.       Dizziness present for the last couple weeks. Sensation of room spinning.  Movement of eyes makes symptoms worse. She thought that the right side is worse than the left. She has symptoms bilaterally. She has had BPPV in the past. She has tried doing the epley at home without significant improvement. Tinnitus present but not pulsating. He denies any chest pain, sob, or difficulty breathing.     She is also mentioning having her dentist mention possible sleep apnea. She grids her teeth as well.     Continues to follow with Dr. Wright for the bladder cancer.     Review of Systems   Constitutional, HEENT, cardiovascular, pulmonary, gi and gu systems are negative, except as otherwise noted.      Objective    /72 (BP Location: Right arm, Patient Position: Chair, Cuff Size: Adult Large)   Pulse 91   Temp 98.4  F (36.9  C) (Tympanic)   Resp 14   Ht 1.6 m (5' 3\")   Wt 81.2 kg (179 lb)   LMP 10/28/2019   SpO2 98%   Breastfeeding No   BMI 31.71 kg/m    Body mass index is 31.71 kg/m .  Physical Exam   GENERAL: healthy, alert and no distress  NECK: no adenopathy, no asymmetry, masses, or scars and thyroid normal to palpation  RESP: lungs clear to auscultation - no rales, rhonchi or wheezes  CV: regular rate and rhythm, normal S1 S2, no S3 or S4, no murmur, click or rub, no peripheral edema and peripheral pulses strong  ABDOMEN: soft, nontender, no hepatosplenomegaly, no masses and bowel sounds " normal  MS: no gross musculoskeletal defects noted, no edema  NEURO: Normal strength and tone, mentation intact and speech normal  PSYCH: mentation appears normal, affect normal/bright  LYMPH: no cervical, supraclavicular, axillary, or inguinal adenopathy

## 2022-05-05 NOTE — PATIENT INSTRUCTIONS
Dr Anna [facility] half somersault   Look up on [email or web address]      Follow these steps if the problem is with your right ear:    Start by sitting on a bed.  Turn your head 45 degrees to the right.  Quickly lie back, keeping your head turned. Your shoulders should now be on the pillow, and your head should be reclined. Wait 30 seconds.  Turn your head 90 degrees to the left, without raising it. Your head will now be looking 45 degrees to the left. Wait another 30 seconds.  Turn your head and body another 90 degrees to the left, into the bed. Wait another 30 seconds.  Sit up on the left side.      Follow these steps if the problem is with your left ear:    Start by sitting on a bed.  Turn your head 45 degrees to the left.  Quickly lie back, keeping your head turned. Your shoulders should now be on the pillow, and your head should be reclined. Wait 30 seconds.  Turn your head 90 degrees to the right, without raising it. Your head will now be looking 45 degrees to the right. Wait another 30 seconds.  Turn your head and body another 90 degrees to the right, into the bed. Wait another 30 seconds.  Sit up on the right side.

## 2022-05-09 DIAGNOSIS — H81.13 BENIGN PAROXYSMAL POSITIONAL VERTIGO DUE TO BILATERAL VESTIBULAR DISORDER: Primary | ICD-10-CM

## 2022-05-11 ENCOUNTER — HOSPITAL ENCOUNTER (OUTPATIENT)
Dept: PHYSICAL THERAPY | Facility: REHABILITATION | Age: 57
Discharge: HOME | End: 2022-05-11
Attending: NURSE PRACTITIONER
Payer: COMMERCIAL

## 2022-05-11 DIAGNOSIS — H81.13 BENIGN PAROXYSMAL POSITIONAL VERTIGO DUE TO BILATERAL VESTIBULAR DISORDER: ICD-10-CM

## 2022-05-11 DIAGNOSIS — R42 DIZZINESS: Primary | ICD-10-CM

## 2022-05-11 PROCEDURE — 95992 CANALITH REPOSITIONING PROC: CPT | Mod: GP | Performed by: PHYSICAL THERAPIST

## 2022-05-11 PROCEDURE — 97161 PT EVAL LOW COMPLEX 20 MIN: CPT | Mod: GP | Performed by: PHYSICAL THERAPIST

## 2022-05-11 NOTE — PROGRESS NOTES
05/11/22 1600   Quick Adds   Quick Adds Vestibular Eval   Type of Visit Initial OP PT Evaluation       Present No   General Information   Start of Care Date 05/11/22   Referring Physician Amy Mendosa   Orders Evaluate and Treat as Indicated   Order Date 05/09/22   Medical Diagnosis Benign paroxysmal positional vertigo due to bilateral vestibular disorder   Onset of illness/injury or Date of Surgery 04/05/22   Pertinent history of current problem (include personal factors and/or comorbidities that impact the POC) Pt reports sudden dizziness upon sleeping. When it occurs it feels like the room is spinning; dizziness lasting for a few seconds. Has a feeling of fullness in the right ear. Gets out of bed on the left side. She tried the epley at home - tried it on the right first and had spinning in all the movements; then this got better and she tried it on the left side (the next day) with less symptoms.   Prior level of function comment Indep   Patient role/Employment history Employed   Patient/Family Goals Statement Get rid of dizziness   Fall Risk Screen   Fall screen completed by PT   Have you fallen 2 or more times in the past year? No   Have you fallen and had an injury in the past year? No   Is patient a fall risk? No   Abuse Screen (yes response referral indicated)   Feels Unsafe at Home or Work/School no   Feels Threatened by Someone no   Does Anyone Try to Keep You From Having Contact with Others or Doing Things Outside Your Home? no   Physical Signs of Abuse Present no   Patient needs abuse support services and resources No   System Outcome Measures   Outcome Measures BPPV   Dizziness Handicap Inventory (score out of 100) A decrease in score by 17.18 or greater indicates a clinically significant change in symptoms. 24   Pain   Patient currently in pain No   Transfer Skills   Transfer Comments Indep   Gait   Gait Comments Indep   Cervicogenic Screen   Neck ROM flx WNL with dizziness,  ext WNL, R rotation WNL, L rotation WNL, R side bending WNL, L side bending WNL   Oculomotor Exam   Smooth Pursuit Normal   Smooth Pursuit Comment felt unstable to pt; hard to follow   Saccades Abnormal   Saccades Comments catch-up saccades with horizontal testing in each direction, felt challenging to pt with vertical testing   VOR Normal   Rapid Head Thrust Corrective Saccade L head thrust   Infrared Goggle Exam or Frenzel Lense Exam   Vestibular Suppressant in Last 24 Hours? No   Exam completed with Room Light   Spontaneous Nystagmus Negative   Gaze Evoked Nystagmus Negative   Head Shake Horizontal Nystagmus Negative   Head Shake Horizontal Nystagmus comments Pt felt dizzy briefly   [place]-Hallpike (right) Negative   Gadiel-Hallpike (Left) Upbeating;Upbeating L torsional   [place]-Hallpike (left) comments duration of 10 sec   HSCC Supine Roll Test (Right) Negative   HSCC Supine Roll Test (Left) Negative   HSCC Supine Roll Test (Left) Comments  felt brief dizziness   BPPV Canal(s) L Posterior   BPPV Type Canalithasis   Planned Therapy Interventions   Planned Therapy Interventions Comment CRM   Clinical Impression   Criteria for Skilled Therapeutic Interventions Met yes, treatment indicated   PT Diagnosis Dizziness   Influenced by the following impairments Dizziness   Functional limitations due to impairments bed mobility limited d/t dizziness   Clinical Presentation Stable/Uncomplicated   Clinical Decision Making (Complexity) Low complexity   Therapy Frequency other (see comments)   Predicted Duration of Therapy Intervention (days/wks) 1 visit   Risk & Benefits of therapy have been explained Yes   Patient, Family & other staff in agreement with plan of care Yes   Clinical Impression Comments Pt is a 57 year-old woman with chief complaint dizziness. She demonstrates symptoms consistent with left posterior canalithiasis. She did well with initial CRM today and had only mild symptoms (without nystagmus) in initial position  only with second performance of CRM. Asked pt to try Epley the next few days if symptoms persist. She is appropriate for eval only.   Total Evaluation Time   PT Eval, Low Complexity Minutes (24838) 25     Mary Huang, PT, DPT, CLT  5/11/2022

## 2022-06-21 ENCOUNTER — HOSPITAL ENCOUNTER (OUTPATIENT)
Dept: MAMMOGRAPHY | Facility: CLINIC | Age: 57
Discharge: HOME | End: 2022-06-21
Attending: NURSE PRACTITIONER | Admitting: NURSE PRACTITIONER
Payer: COMMERCIAL

## 2022-06-21 ENCOUNTER — TELEPHONE (OUTPATIENT)
Dept: OBGYN | Facility: CLINIC | Age: 57
End: 2022-06-21
Payer: COMMERCIAL

## 2022-06-21 DIAGNOSIS — Z12.31 VISIT FOR SCREENING MAMMOGRAM: ICD-10-CM

## 2022-06-21 PROCEDURE — 77067 SCR MAMMO BI INCL CAD: CPT

## 2022-06-21 NOTE — TELEPHONE ENCOUNTER
Panel Management Review        Health Maintenance List    Health Maintenance   Topic Date Due     HIV SCREENING  Never done     HEPATITIS C SCREENING  Never done     ZOSTER IMMUNIZATION (1 of 2) Never done     DTAP/TDAP/TD IMMUNIZATION (2 - Td or Tdap) 04/23/2018     PREVENTIVE CARE VISIT  11/21/2020     COVID-19 Vaccine (4 - Booster for Moderna series) 05/06/2022     MAMMO SCREENING  05/25/2022     HPV TEST  11/21/2022     PAP  11/21/2022     INFLUENZA VACCINE (Season Ended) 09/01/2022     ADVANCE CARE PLANNING  11/21/2024     LIPID  12/13/2024     COLORECTAL CANCER SCREENING  03/02/2026     PHQ-2 (once per calendar year)  Completed     Pneumococcal Vaccine: Pediatrics (0 to 5 Years) and At-Risk Patients (6 to 64 Years)  Aged Out     IPV IMMUNIZATION  Aged Out     MENINGITIS IMMUNIZATION  Aged Out     HEPATITIS B IMMUNIZATION  Aged Out       Composite cancer screening  Chart review shows that this patient is due/due soon for the following Mammogram  Lab Results   Component Value Date    PAP NIL 11/21/2019     Past Surgical History:   Procedure Laterality Date     COLONOSCOPY N/A 3/2/2016    Procedure: COLONOSCOPY;  Surgeon: Bala Pineda MD;  Location: [facility]     CYSTOSCOPY, TRANSURETHRAL RESECTION (TUR) PROSTATE, COMBINED  7/3/2013    Procedure: COMBINED CYSTOSCOPY, TRANSURETHRAL RESECTION (TUR) PROSTATE;  Transurethral Resection of Bladder Cancer;  Surgeon: DONG Wright MD;  Location: [facility]     EYE SURGERY         Is hysterectomy listed in surgical history? No   Is mastectomy listed in surgical history? No     Summary:    Patient is due/failing the following:   Mammogram    Action needed: Patient needs office visit for Mammogram.    Type of outreach:  Sent [employer] message.      Staff Signature:  Howard Rubin CMA

## 2022-06-21 NOTE — LETTER
June 21, 2022      Sangita Montano  [address]  [place] [facility] [address]        Dear Sangita,     To ensure we are providing the best quality care, we have reviewed your chart and see that you are due for:    Breast Cancer Screening:    Please call [phone] to schedule a Mammogram.    You may call Dept: [phone] if you have any questions. If you have completed the mammogram outside of [facility], please have the results forwarded to our office Fax # [phone]. We will update the chart for your primary Physician to review before your next annual physical.              Sincerely,        Yu Strong, DO

## 2022-08-23 ENCOUNTER — OFFICE VISIT (OUTPATIENT)
Dept: UROLOGY | Facility: CLINIC | Age: 57
End: 2022-08-23
Payer: COMMERCIAL

## 2022-08-23 VITALS — HEART RATE: 92 BPM | DIASTOLIC BLOOD PRESSURE: 85 MMHG | OXYGEN SATURATION: 99 % | SYSTOLIC BLOOD PRESSURE: 152 MMHG

## 2022-08-23 DIAGNOSIS — Z85.51 PERSONAL HISTORY OF MALIGNANT NEOPLASM OF BLADDER: Primary | ICD-10-CM

## 2022-08-23 DIAGNOSIS — C67.2 MALIGNANT NEOPLASM OF LATERAL WALL OF URINARY BLADDER (H): ICD-10-CM

## 2022-08-23 PROCEDURE — 52000 CYSTOURETHROSCOPY: CPT | Performed by: UROLOGY

## 2022-08-23 PROCEDURE — 51798 US URINE CAPACITY MEASURE: CPT | Performed by: UROLOGY

## 2022-08-23 ASSESSMENT — PAIN SCALES - GENERAL: PAINLEVEL: NO PAIN (0)

## 2022-08-23 NOTE — PROGRESS NOTES
Active order to obtain bladder scan? Yes   Name of ordering provider:  Adriana  Bladder scan preformed post void Yes:   Bladder scan reveled 123ML  Provider notified?  Yes      Pt brought back to the procedure room for a cystoscopy per Dr. Wright Informed consent obtained, pt prepped in a sterile manner and a uro jet was used.      Neyda Campa LPN

## 2022-08-23 NOTE — PROGRESS NOTES
Appointment source: Established Patient  Patient name: Sangita Montano  Urology Staff: Regis Wright MD    Subjective: This is a 57 year old year old female returning for follow up of a diagnosis of low-grade bladder cancer resected on 7/3/2013.    1 course of BCG therapy in 2013.    No recurrences.    Objective: Cystoscopy today was without evidence of neoplasm.    Assessment: History of low-grade noninvasive bladder cancer with 1 treatment of BCG.    Plan: We will now move to every 6 month cystoscopy.    Total time 10 minutes

## 2022-08-23 NOTE — NURSING NOTE
"Initial BP (!) 152/85   Pulse 92   LMP 10/28/2019   SpO2 99%  Estimated body mass index is 31.71 kg/m  as calculated from the following:    Height as of 5/5/22: 1.6 m (5' 3\").    Weight as of 5/5/22: 81.2 kg (179 lb). .    Neyda Campa LPN on 8/23/2022 at 10:49 AM    "

## 2022-08-26 PROBLEM — R10.13 DYSPEPSIA: Status: ACTIVE | Noted: 2022-08-26

## 2022-08-29 ENCOUNTER — VIRTUAL VISIT (OUTPATIENT)
Dept: SLEEP MEDICINE | Facility: CLINIC | Age: 57
End: 2022-08-29
Attending: NURSE PRACTITIONER
Payer: COMMERCIAL

## 2022-08-29 VITALS — HEIGHT: 63 IN | WEIGHT: 175 LBS | BODY MASS INDEX: 31.01 KG/M2

## 2022-08-29 DIAGNOSIS — Z72.820 LACK OF ADEQUATE SLEEP: ICD-10-CM

## 2022-08-29 DIAGNOSIS — R53.83 FATIGUE, UNSPECIFIED TYPE: ICD-10-CM

## 2022-08-29 DIAGNOSIS — F45.8 GRINDING TEETH: ICD-10-CM

## 2022-08-29 DIAGNOSIS — R53.83 MALAISE AND FATIGUE: ICD-10-CM

## 2022-08-29 DIAGNOSIS — R53.81 MALAISE AND FATIGUE: ICD-10-CM

## 2022-08-29 DIAGNOSIS — E66.811 CLASS 1 OBESITY DUE TO EXCESS CALORIES WITH BODY MASS INDEX (BMI) OF 31.0 TO 31.9 IN ADULT, UNSPECIFIED WHETHER SERIOUS COMORBIDITY PRESENT: ICD-10-CM

## 2022-08-29 DIAGNOSIS — E66.09 CLASS 1 OBESITY DUE TO EXCESS CALORIES WITH BODY MASS INDEX (BMI) OF 31.0 TO 31.9 IN ADULT, UNSPECIFIED WHETHER SERIOUS COMORBIDITY PRESENT: ICD-10-CM

## 2022-08-29 PROBLEM — R10.13 DYSPEPSIA: Status: RESOLVED | Noted: 2022-08-26 | Resolved: 2022-08-29

## 2022-08-29 PROCEDURE — 99204 OFFICE O/P NEW MOD 45 MIN: CPT | Mod: 95 | Performed by: PHYSICIAN ASSISTANT

## 2022-08-29 RX ORDER — ZOLPIDEM TARTRATE 5 MG/1
TABLET ORAL
Qty: 1 TABLET | Refills: 0 | Status: SHIPPED | OUTPATIENT
Start: 2022-08-29 | End: 2023-03-06

## 2022-08-29 ASSESSMENT — SLEEP AND FATIGUE QUESTIONNAIRES
HOW LIKELY ARE YOU TO NOD OFF OR FALL ASLEEP WHILE WATCHING TV: MODERATE CHANCE OF DOZING
HOW LIKELY ARE YOU TO NOD OFF OR FALL ASLEEP WHEN YOU ARE A PASSENGER IN A CAR FOR AN HOUR WITHOUT A BREAK: WOULD NEVER DOZE
HOW LIKELY ARE YOU TO NOD OFF OR FALL ASLEEP IN A CAR, WHILE STOPPED FOR A FEW MINUTES IN TRAFFIC: WOULD NEVER DOZE
HOW LIKELY ARE YOU TO NOD OFF OR FALL ASLEEP WHILE SITTING AND READING: WOULD NEVER DOZE
HOW LIKELY ARE YOU TO NOD OFF OR FALL ASLEEP WHILE SITTING INACTIVE IN A PUBLIC PLACE: WOULD NEVER DOZE
HOW LIKELY ARE YOU TO NOD OFF OR FALL ASLEEP WHILE SITTING QUIETLY AFTER LUNCH WITHOUT ALCOHOL: WOULD NEVER DOZE
HOW LIKELY ARE YOU TO NOD OFF OR FALL ASLEEP WHILE LYING DOWN TO REST IN THE AFTERNOON WHEN CIRCUMSTANCES PERMIT: HIGH CHANCE OF DOZING
HOW LIKELY ARE YOU TO NOD OFF OR FALL ASLEEP WHILE SITTING AND TALKING TO SOMEONE: WOULD NEVER DOZE

## 2022-08-29 ASSESSMENT — PAIN SCALES - GENERAL: PAINLEVEL: NO PAIN (0)

## 2022-08-29 NOTE — PATIENT INSTRUCTIONS
MY CONTACT NUMBERS ARE: [phone]  DOCTOR : ERNESTO Morris  SLEEP CENTER :   CPAP EQUIPMENT :    IF I HAVE SLEEP APNEA.....  WHERE CAN I FIND MORE INFORMATION?    American [employer] Patient information on sleep disorders:  http://yoursleep.aasmnet.org    THINGS TO REMEMBER  In most situations, sleep apnea is a lifelong disease that must be managed with daily therapy. Untreated disease, when severe, may result in an increased risk for an array of problems from heart disease to mood changes, car accidents and shorter lifespan.    CPAP -  WHY AND HOW?  Continuous positive airway pressure, or CPAP, is the most effective treatment for obstructive sleep apnea. A decision to use CPAP is a major step forward in the pursuit of a healthier life. The successful use of CPAP will help you breathe easier, sleep better and live healthier. Using CPAP can be a positive experience if you keep these mayer points in mind:  Commitment  CPAP is not a quick fix for your problem. It involves a long-term commitment to improve your sleep and your health.    Communication  Stay in close communication with both your sleep doctor and your CPAP supplier. Ask lots of questions and seek help when you need it.    Consistency  Use CPAP all night, every night and for every nap. You will receive the maximum health benefits from CPAP when you use it every time that you sleep. This will also make it easier for your body to adjust to the treatment.    Correction  The first machine and mask that you try may not be the best ones for you. Work with your sleep doctor and your CPAP supplier to make corrections to your equipment selection. Ask about trying a different type of machine or mask if you have ongoing problems. Make sure that your mask is a good fit and learn to use your equipment properly.    Challenge  Tell a family member or close friend to ask you each morning if you used your CPAP the previous night. Have someone to  "challenge you to give it your best effort.    Connection   Your adjustment to CPAP will be easier if you are able to connect with others who use the same treatment. Ask your sleep doctor if there is a support group in your area for people who have sleep apnea, or look for one on the Internet.    Comfort   Increase your level of comfort by using a saline spray, decongestant or heated humidifier if CPAP irritates your nose, mouth or throat. Use your unit's \"ramp\" setting to slowly get used to the air pressure level. There may be soft pads you can buy that will fit over your mask straps. Look on www.CPAP.com for accessories such as these straps, a pillow contoured for side-sleeping with CPAP, longer hoses, hose covers to reduce condensation, or stands to keep the hose out of your way.                                 Cleaning   Clean your mask, tubing and headgear on a regular basis. Put this time in your schedule so that you don't forget to do it. Check and replace the filters for your CPAP unit and humidifier.    Completion   Although you are never finished with CPAP therapy, you should reward yourself by celebrating the completion of your first month of treatment. Expect this first month to be your hardest period of adjustment. It will involve some trial and error as you find the machine, mask and pressure settings that are right for you.    Continuation  After your first month of treatment, continue to make a daily commitment to use your CPAP all night, every night and for every nap.    CPAP-Tips to starting with success:  Begin using your CPAP for short periods of time during the day while you watch TV or read.    Use CPAP every night and for every nap. Using it less often reduces the health benefits and makes it harder for your body to get used to it.    Newer CPAP models are virtually silent; however, if you find the sound of your CPAP machine to be bothersome, place the unit under your bed to dampen the sound. "     Make small adjustments to your mask, tubing, straps and headgear until you get the right fit. Tightening the mask may actually worsen the leak.  If it leaves significant marks on your face or irritates the bridge of your nose, it may not be the best mask for you.  Speak with the person who supplied the mask and consider trying other masks.    Use a saline nasal spray to ease mild nasal congestion. Neti-Pot or saline nasal rinses may also help. Nasal gel sprays can help reduce nasal dryness.  Biotene mouthwash can be helpful to protect your teeth if you experience frequent dry mouth.  Dry mouth may be a sign of air escaping out of your mouth or out of the mask in the case of a full face mask.  Speak with your provider if you expect that is the case.     Take a nasal decongestant to relieve more severe nasal or sinus congestion.  Do not use Afrin (oxymetazoline) nasal spray more than 3 days in a row.  Speak with your sleep doctor if your nasal congestion is chronic.    Use a heated humidifier that fits your CPAP model to enhance your breathing comfort. Adjust the heat setting up if you get a dry nose or throat, down if you get condensation in the hose or mask.  Position the CPAP lower than you so that any condensation in the hose drains back into the machine rather than towards the mask.    Try a system that uses nasal pillows if traditional masks give you problems.    Clean your mask, tubing and headgear once a week. Make sure the equipment dries fully.    Regularly check and replace the filters for your CPAP unit and humidifier.    Work closely with your sleep doctor and your CPAP supplier to make sure that you have the machine, mask and air pressure setting that works best for you.    BESIDES CPAP, WHAT OTHER THERAPIES ARE THERE?  Postioning devices if you only have the problem on your back  Dental devices if your condition is mild  Nasal valves may be effective though experience is limited  Tongue Retaining  Device if missing teeth precludes the use of a dental device  Weight loss if you are overweight  Surgery in limited cases where devices are not acceptable or there are problems with structures in the nose and throat  If treated with one of these alternative options, further evaluation is necessary to ensure that the therapy is effective. This may require some form of testing.     Healthy Lifestyle:  Healthy diet, exercise and Limit alcohol: Not only will excessive alcohol increase your weight over time, but it irritates the throat tissues and make them swell, shrinking the airway and causing snoring. Drinking alcohol should be limited and stopped within 3-4 hours before going to bed.   Stop smoking: (Red swollen throat, heat, nicotine), also irritates and swells the airway, among numerous other negative health consequences.    Positioning Device  This example shows a pillow that straps around the waist. It may be appropriate for those whose sleep study shows milder sleep apnea that occurs primarily when lying flat on one's back. Preliminary studies have shown benefit but effectiveness at home should be verified.    Nasal Valves              Nasal valves may not be effective if you have frequent nasal congestion or have difficulty breathing through your nose. They may be an option for mild apnea if other options are not well tolerated. The efficacy of these devices is generally less than CPAP or oral appliances.  Oral Appliance  These are examples of two of many custom-made devices that are more likely to work in mild sleep apnea  Oral appliances are dental mouth pieces that fit very much like a sports mouth guards or removable orthodontic retainers. They are used to treat snoring  And obstructive sleep apnea . The device prevents the airway from collapsing by either holding the tongue or supporting the jaw in a forward position. Since oral appliances are non-invasive and easy to use, they may be considered as an  early treatment option. Oral appliance therapy (OAT) involves the customization, selection, fabrication, fitting, adjustments and long-term follow-up care of specially designed oral devices, worn during sleep, which reposition the lower jaw and tongue base forward to maintain an open airway.  Custom made oral appliances are proven to be more effective than over-the-counter devices. Therefore, the over-the-counter devices are recommended not to be used as a screening tool nor as a therapeutic option.  Who gets a dental device?  Oral appliance therapy can be used as an alternative to  CPAP therapy for the treatment of mild to moderate sleep apnea and for those patients who prefer OAT to CPAP. Oral appliance therapy is a first line therapy for the treatment of primary snoring. Additionally, OAT is an option for those that cannot tolerate CPAP as therapy or who have experienced insufficient surgical results.    Possible side effects?  Frequent but minor side effects include: excessive salivation, dry mouth, discomfort of teeth and jaw and temporary changes in the patient s bite.  Potential complications include: jaw pain, permanent occlusal changes and TMJ symptoms.  The above mentioned side effects and complications can be recognized and managed by dentists trained in dental sleep medicine.    Finding a dentist that practices dental sleep medicine  Specific training is available through the American Academy of Dental Sleep Medicine for dentists interested in working in the field of sleep. To find a dentist who is educated in the field of sleep and the use of oral appliances, near you, visit the Web site of the American Academy of Dental Sleep Medicine; also see http://www.accpstorage.org/newOrganization/patients/oralAppliances.pdf   To search for a dentist certified in these practices:  Http://aadsm.org/FindADentist.[email or web address]  [email or web address]    Tongue Retaining  Device               Tongue Retaining Devices are devices that generally  suction cup  onto the tongue preventing it from falling back into the back of the throat during sleep.  They may be an option for people missing teeth, but can be uncomfortable. This particular device can be purchased online, but similar devices made by dentists fit more precisely and may be tolerated better. In general, they are rarely effective and often not very well tolerated.    Weight Loss:  Some patients may experience reduction or elimination of sleep apnea with weight loss.  Though there are significant health benefits from weight loss, long-term weight loss is very difficult to achieve- studies show success with dietary management in less that 10% of people.     If you are interested in dietary weight loss, you should review the options discussed at the National Institutes of Health patient information site:     Http:/www.health.nih.gov/topic/WeightLossDieting    Bariatric programs offer counseling in all methods of weight loss:    Http:/www.[facility].[email or web address]/WeightLossSurgeryandMedicalMgmt/htm    Surgery:  There are a number of surgeries that have been attempted to treat apnea. In general, surgical options are usually reserved for cases in which there is a physical abnormality contributing to obstruction or other treatment options are ineffective or not tolerated. Most surgical options are either unreliable or quite invasive. One of the more common procedures is:  Uvulopalatopharyngoplasty: In this procedure, the uvula (the finger-like tissue that hangs in the back of the throat), part of the soft palate (the tissue that the uvula is attached to), and sometimes the tonsils or adenoids are removed. The efficacy of this surgery is around 30-50% .  After surgery, complications may include:  Sleepiness and sleep apnea related to post-surgery medication   Swelling, infection and bleeding   A sore throat and/or difficulty  "swallowing   Drainage of secretions into the nose and a nasal quality to the voice. English language speech does not seem to be affected by this surgery.   Narrowing of the airway in the nose and throat (hence constricting breathing) snoring and even iatrogenically caused sleep apnea. By cutting the tissues, excess scar tissue can \"tighten\" the airway and make it even smaller than it was before UPPP.  Patients who have had the uvula removed will become unable to correctly speak [place] or any other language that has a uvular 'r' phoneme.    Surgeries to help resolve nasal congestion may help reduce the severity of apnea slightly. Nasal congestion does not cause apnea on its own, so these surgeries are usually not performed just for NELLIE.  They may be worth considering if the nasal congestion is significantly bothersome independent of apnea.   "

## 2022-08-29 NOTE — PROGRESS NOTES
Neyda is a 57 year old who is being evaluated via a billable video visit.      How would you like to obtain your AVS? MyChart  If the video visit is dropped, the invitation should be resent by: Send to e-mail at: [email or web address]  Will anyone else be joining your video visit? Kia Damicojoel Montalvo      Video-Visit Details    Video Start Time: 3:11 PM    Type of service:  Video Visit    Video End Time:3:46 PM    Originating Location (pt. Location): Home    Distant Location (provider location):  [facility]     Platform used for Video Visit: [facility]       Outpatient Sleep Medicine Consultation:      Name: Sangita Montano MRN# [id number]   Age: 57 year old YOB: 1965     Date of Consultation: August 29, 2022  Consultation is requested by: Amy Mendosa, THA CNP  [id number] [place] [address] Amy Mendosa  Primary care provider: Alma Delia Burns       Reason for Sleep Consult:     Sangita Montano is sent by Amy Mendosa for a sleep consultation regarding fatigue and bruxism.    Patient s Reason for visit  Sangita LEHMAN Charmaine main reason for visit: My dentist is noticing some issues with my jaw that could indicate sleep apnea.  Patient states problem(s) started: I'm not sure  Sangita DOMINIK Charmaine's goals for this visit: To determine if I actually do have sleep apnea           Assessment and Plan:     Summary Sleep Diagnoses & Recommendations:    Patient has features and risk factors for possible obstructive sleep apnea including: non-refreshing sleep, bruxism, morning dry mouth, daytime fatigue, difficulty maintaining sleep and crowded oropharynx. Snoring status is unknown as there is no bed partner.  The STOP-BANG score is 2/8.  The pathophysiology, diagnosis and treatment of NELLIE was discussed. Recommend polysomnogram (using 4% desaturation/Medicare/ AASM 1B scoring rules) to evaluate for obstructive sleep apnea. Ambien if  needed. Patient is a poor candidate for Home Sleep Testing due to symptoms of NELLIE but low pre-test probability of NELLIE  (STOPBANG 2).    Recommend weight management. We discussed the link between obesity, sleep apnea, and health outcomes.     Discussed sleep hygiene, stimulus control and sleep restriction.    Summary Recommendations:    Orders Placed This Encounter   Procedures     Comprehensive Sleep Study       Summary Counseling:    Sleep Testing Reviewed  Obstructive Sleep Apnea Reviewed  Complications of Untreated Sleep Apnea Reviewed      Medical Decision-making:   Educational materials provided in instructions    Total time spent reviewing medical records, history and physical examination, review of previous testing and interpretation as well as documentation on this date:55 minutes    CC: Amy Mendosa          History of Present Illness:     Past Sleep Evaluations:    SLEEP-WAKE SCHEDULE:     Work/School Days: Patient goes to school/work: Yes   Usually gets into bed at 11 PM  Takes patient about It can take up to 2 hours some nights to fall asleep. 2 hours for the past 2 months because of some stressors. Usually takes a few minutes to fall asleep.   Has trouble falling asleep Approximately 3 nights a week.    Wakes up in the middle of the night 0-1 times.  Wakes up due to Use the bathroom  She has trouble falling back asleep None times a week.   It usually takes Less than 5 minutes to get back to sleep  Patient is usually up at 7-7:30  Uses alarm: No    Weekends/Non-work Days/All Other Days:  Usually gets into bed at 11 pm   Takes patient about Same as during the week to fall asleep  Patient is usually up at 7-8 AM  Uses alarm: No    Sleep Need  Patient gets  6-7 hours sleep on average   Patient thinks she needs about probably 8 sleep    Sangita Montano prefers to sleep in this position(s): Back   Patient states they do the following activities in bed:      Naps  Patient takes a purposeful nap When  I'm tired I will take a 30 minute nap maybe once per week. times a week and naps are usually 20-30 minutes in duration  She feels better after a nap: Yes  She dozes off unintentionally None days per week  Patient has had a driving accident or near-miss due to sleepiness/drowsiness: No      SLEEP DISRUPTIONS:    Breathing/Snoring  Patient snores: No. No bed partner for 10 years.   Other people complain about her snoring: No  Patient has been told she stops breathing in her sleep:No  She has issues with the following: Morning mouth dryness;Stuffy nose when you wake up    Movement:  Patient gets pain, discomfort, with an urge to move:  No  It happens when she is resting:  No  It happens more at night:  No  Patient has been told she kicks her legs at night:  No     Behaviors in Sleep:  Sangita Montano has experienced the following behaviors while sleeping: Sleep-talking;Teeth grinding  She has experienced sudden muscle weakness during the day: No      Is there anything else you would like your sleep provider to know: I do have some fatigue upon waking.      CAFFEINE AND OTHER SUBSTANCES:    Patient consumes caffeinated beverages per day:  none  Last caffeine use is usually: none  List of any prescribed or over the counter stimulants that patient takes: none  List of any prescribed or over the counter sleep medication patient takes: none  List of previous sleep medications that patient has tried: none  Patient drinks alcohol to help them sleep: No  Patient drinks alcohol near bedtime: No    Family History:  Patient has a family member been diagnosed with a sleep disorder: No            SCALES:    EPWORTH SLEEPINESS SCALE      [place] Sleepiness Scale ( SAMMY Chavez  1990-1997[facility]/English - Final version - 21 Nov 07 - [facility] [place].) 8/29/2022   Sitting and reading Would never doze   Watching TV Moderate chance of dozing   Sitting, inactive in a public place (e.g. a theatre or a meeting) Would never  doze   As a passenger in a car for an hour without a break Would never doze   Lying down to rest in the afternoon when circumstances permit High chance of dozing   Sitting and talking to someone Would never doze   Sitting quietly after a lunch without alcohol Would never doze   In a car, while stopped for a few minutes in traffic Would never doze   Ruby Score (MC) 5   Ruby Score (Sleep) 5         INSOMNIA SEVERITY INDEX (DANNIE)      Insomnia Severity Index (DANNIE) 8/29/2022   Difficulty falling asleep 2   Difficulty staying asleep 0   Problems waking up too early 0   How SATISFIED/DISSATISFIED are you with your CURRENT sleep pattern? 2   How NOTICEABLE to others do you think your sleep problem is in terms of impairing the quality of your life? 0   How WORRIED/DISTRESSED are you about your current sleep problem? 2   To what extent do you consider your sleep problem to INTERFERE with your daily functioning (e.g. daytime fatigue, mood, ability to function at work/daily chores, concentration, memory, mood, etc.) CURRENTLY? 2   DANNIE Total Score 8       Guidelines for Scoring/Interpretation:  Total score categories:  0-7 = No clinically significant insomnia   8-14 = Subthreshold insomnia   15-21 = Clinical insomnia (moderate severity)  22-28 = Clinical insomnia (severe)  Used via courtesy of www.Enjectth.va.gov with permission from Bin Abda PhD., [facility]      STOP BANG     STOP BANG Questionnaire (  2008, the American Society of Anesthesiologists, Inc. [employer] 8/29/2022   1. Snoring - Do you snore loudly (louder than talking or loud enough to be heard through closed doors)? No   2. Tired - Do you often feel tired, fatigued, or sleepy during daytime? No   3. Observed - Has anyone observed you stop breathing during your sleep? No   4. Blood pressure - Do you have or are you being treated for high blood pressure? No   5. BMI - BMI more than 35 kg/m2? Yes   6. Age - Age over 50  yr old? Yes   7. Neck circumference - Neck circumference greater than 40 cm? No   8. Gender - Gender male? No   STOP BANG Score (MC): 3 (High risk of NELLIE)   B/P Clinic: -   BMI Clinic: 31           Allergies:    Allergies   Allergen Reactions     Other Environmental Allergy Other (See Comments)     Airborne pollen: Runny nose, sneezing, and fatigue.  Processed meat (only tolerates fresh meat): Rash and migraine  Fermented foods: Rash and migraine  Vinegar: Rash and migraine     Penicillins Hives     Pollen Extract-Tree Extract [Pollen Extract] Other (See Comments)     Sneezing, fatigue, and runny nose     Sulfa Drugs Hives     Other Food Allergy Rash     migraine     Solanum Lycopersicum [Tomato] Rash     Migraine       Medications:    Current Outpatient Medications   Medication Sig Dispense Refill     ascorbic acid (VITAMIN C) 500 MG tablet Take by mouth daily 9980-2151 mg daily       BORAGE OIL-GLA-LINOLEIC ACID  mg GLA, 3000 mg Borage oil, 600 mg Linolenic acid, 30 mg Vitamin E       cetirizine (ZYRTEC) 10 MG tablet Take 10 mg by mouth daily       Cholecalciferol (VITAMIN D-3) 5000 UNITS TABS        famotidine (PEPCID) 40 MG tablet Take 1 tablet (40 mg) by mouth daily 14 tablet 0     fish oil-omega-3 fatty acids 1000 MG capsule Take 1,000 mg by mouth daily 3 tablets daily       MAGNESIUM GLYCINATE 100 mg qd       meclizine (ANTIVERT) 25 MG tablet Take 1 tablet (25 mg) by mouth 3 times daily as needed for dizziness 30 tablet 0     multivitamin, therapeutic (THERA-VIT) TABS tablet Take 1 tablet by mouth daily       NEW MED Ortho Digestyme: blend of pancreatin, pepsin, bromelain, papain, betaine, taken as directed       UNABLE TO FIND MEDICATION NAME: Biotic 7       zolpidem (AMBIEN) 5 MG tablet Take tablet by mouth 15 minutes prior to sleep, for Sleep Study 1 tablet 0       Problem List:  Patient Active Problem List    Diagnosis Date Noted     Hyperlipidemia with target LDL less than 130 12/13/2019      Priority: Medium     CARDIOVASCULAR SCREENING; LDL GOAL LESS THAN 160 01/27/2015     Priority: Medium     Bladder cancer (H) 07/01/2013     Priority: Medium     2013. In remission.        Neoplasm of uncertain behavior of skin 03/29/2012     Priority: Medium     Mechanical strabismus, unspecified 04/23/2008     Priority: Medium     Convergence insufficiency or palsy in binocular eye movement 04/27/2007     Priority: Medium        Past Medical/Surgical History:  Past Medical History:   Diagnosis Date     Bladder cancer (H)     7/2013     Dyspepsia 8/26/2022     Excessive or frequent menstruation 3/25/2006     Past Surgical History:   Procedure Laterality Date     COLONOSCOPY N/A 3/2/2016    Procedure: COLONOSCOPY;  Surgeon: Bala Pineda MD;  Location: [facility]     CYSTOSCOPY, TRANSURETHRAL RESECTION (TUR) PROSTATE, COMBINED  7/3/2013    Procedure: COMBINED CYSTOSCOPY, TRANSURETHRAL RESECTION (TUR) PROSTATE;  Transurethral Resection of Bladder Cancer;  Surgeon: DONG Wright MD;  Location: [facility]     EYE SURGERY         Social History:  Social History     Socioeconomic History     Marital status: Single     Spouse name: Not on file     Number of children: Not on file     Years of education: Not on file     Highest education level: Not on file   Occupational History     Occupation: Investigator     Employer: [employer] [place]   Tobacco Use     Smoking status: Never Smoker     Smokeless tobacco: Never Used   Vaping Use     Vaping Use: Never used   Substance and Sexual Activity     Alcohol use: No     Drug use: No     Sexual activity: Never   Other Topics Concern     Parent/sibling w/ CABG, MI or angioplasty before 65F 55M? Yes     Comment: Brother 46 and Father 37   Social History Narrative     Not on file     Social Determinants of Health     Financial Resource Strain: Not on file   Food Insecurity: Not on file   Transportation Needs: Not on file   Physical Activity: Not on file   Stress: Not on file   Social Connections:  "Not on file   Intimate Partner Violence: Not on file   Housing Stability: Not on file       Family History:  Family History   Problem Relation Age of Onset     Cerebrovascular Disease Mother      Hypertension Father      Cancer Father         bladder cancer,  liver cancer     Heart Disease Father      Heart Disease Brother 46        MI     Breast Cancer No family hx of        Review of Systems:  A complete review of systems reviewed by me is negative with the exeption of what has been mentioned in the history of present illness.  In the last TWO WEEKS have you experienced any of the following symptoms?  Night Sweats: Yes  Sore Throat in Morning: No  Dry Mouth in the Morning: Yes  Shortness of Breath Lying Flat: No  Shortness of Breath With Activity: No  Awakening with Shortness of Breath: No  Increased Cough: No  Heart Racing at Night: No  Swelling in Feet or Legs: No  Diarrhea at Night: No  Heartburn at Night: No  Urinating More than Once at Night: No  Losing Control of Urine at Night: No  Joint Pains at Night: No  Headaches in Morning: No  Weakness in Arms or Legs: No  Depressed Mood: No  Anxiety: No     Physical Examination:  Vitals: Ht 1.6 m (5' 3\")   Wt 79.4 kg (175 lb)   LMP 10/28/2019   BMI 31.00 kg/m    BMI= Body mass index is 31 kg/m .           GENERAL APPEARANCE: alert and no distress  EYES: PERRL and conjunctivae and sclerae normal  HENT: oropharynx crowded and tongue base enlarged  NECK: no asymmetry, masses, or scars  RESP: breathing is non-labored  NEURO: mentation intact and speech normal  PSYCH: affect normal/bright  Mallampati Class: IV.  Tonsillar Stage:        Data: All pertinent previous laboratory data reviewed     Recent Labs   Lab Test 12/23/20  1024 12/13/19  0749 07/29/14  1700     --  139   POTASSIUM 3.9  --  5.3   CHLORIDE 102  --  106   CO2 29  --  24   ANIONGAP 5  --  9   * 100* 100*   BUN 16  --  29   CR 0.86  --  0.95   RICK 9.8  --  9.1       Recent Labs   Lab Test " 12/23/20  1024   WBC 6.0   RBC 4.78   HGB 13.7   HCT 42.6   MCV 89   MCH 28.7   MCHC 32.2   RDW 13.9          Recent Labs   Lab Test 12/23/20  1024   PROTTOTAL 7.5   ALBUMIN 3.8   BILITOTAL 0.5   ALKPHOS 69   AST 18   ALT 31       TSH (mU/L)   Date Value   04/07/2022 0.95   12/23/2020 1.00   12/13/2019 1.34         Ferritin   Date/Time Value Ref Range Status   12/23/2020 10:24  8 - 252 ng/mL Final         Rober Valencia PA-C 8/29/2022

## 2022-11-16 DIAGNOSIS — I10 BENIGN ESSENTIAL HYPERTENSION: ICD-10-CM

## 2022-11-16 DIAGNOSIS — Z83.3 FAMILY HISTORY OF DIABETES MELLITUS: Primary | ICD-10-CM

## 2022-11-19 ENCOUNTER — HEALTH MAINTENANCE LETTER (OUTPATIENT)
Age: 57
End: 2022-11-19

## 2022-11-25 ENCOUNTER — LAB (OUTPATIENT)
Dept: LAB | Facility: CLINIC | Age: 57
End: 2022-11-25
Payer: COMMERCIAL

## 2022-11-25 DIAGNOSIS — Z11.59 NEED FOR HEPATITIS C SCREENING TEST: ICD-10-CM

## 2022-11-25 DIAGNOSIS — Z11.4 SCREENING FOR HIV (HUMAN IMMUNODEFICIENCY VIRUS): ICD-10-CM

## 2022-11-25 DIAGNOSIS — I10 BENIGN ESSENTIAL HYPERTENSION: ICD-10-CM

## 2022-11-25 DIAGNOSIS — Z83.3 FAMILY HISTORY OF DIABETES MELLITUS: ICD-10-CM

## 2022-11-25 LAB — CORTIS SERPL-MCNC: 10.1 UG/DL

## 2022-11-25 PROCEDURE — 82627 DEHYDROEPIANDROSTERONE: CPT

## 2022-11-25 PROCEDURE — 36415 COLL VENOUS BLD VENIPUNCTURE: CPT

## 2022-11-25 PROCEDURE — 82533 TOTAL CORTISOL: CPT

## 2022-11-28 LAB — DHEA-S SERPL-MCNC: 59 UG/DL (ref 35–430)

## 2022-12-05 ENCOUNTER — THERAPY VISIT (OUTPATIENT)
Dept: SLEEP MEDICINE | Facility: CLINIC | Age: 57
End: 2022-12-05
Payer: COMMERCIAL

## 2022-12-05 DIAGNOSIS — R53.83 MALAISE AND FATIGUE: ICD-10-CM

## 2022-12-05 DIAGNOSIS — E66.811 CLASS 1 OBESITY DUE TO EXCESS CALORIES WITH BODY MASS INDEX (BMI) OF 31.0 TO 31.9 IN ADULT, UNSPECIFIED WHETHER SERIOUS COMORBIDITY PRESENT: ICD-10-CM

## 2022-12-05 DIAGNOSIS — E66.09 CLASS 1 OBESITY DUE TO EXCESS CALORIES WITH BODY MASS INDEX (BMI) OF 31.0 TO 31.9 IN ADULT, UNSPECIFIED WHETHER SERIOUS COMORBIDITY PRESENT: ICD-10-CM

## 2022-12-05 DIAGNOSIS — R53.83 FATIGUE, UNSPECIFIED TYPE: ICD-10-CM

## 2022-12-05 DIAGNOSIS — F45.8 GRINDING TEETH: ICD-10-CM

## 2022-12-05 DIAGNOSIS — G47.33 OSA (OBSTRUCTIVE SLEEP APNEA): Chronic | ICD-10-CM

## 2022-12-05 DIAGNOSIS — R53.81 MALAISE AND FATIGUE: ICD-10-CM

## 2022-12-05 DIAGNOSIS — Z72.820 LACK OF ADEQUATE SLEEP: ICD-10-CM

## 2022-12-05 DIAGNOSIS — F51.04 CHRONIC INSOMNIA: ICD-10-CM

## 2022-12-05 PROCEDURE — 95810 POLYSOM 6/> YRS 4/> PARAM: CPT | Performed by: INTERNAL MEDICINE

## 2022-12-06 NOTE — PATIENT INSTRUCTIONS
[facility]    1. Your sleep study will be reviewed by a sleep physician within the next few days.     2. Please follow up in the sleep clinic as scheduled, or, make an appointment with your sleep provider to be seen within two weeks to discuss the results of the sleep study.    3. If you have any questions or problems with your treatment plan, please contact your sleep clinic provider at [phone] to further manage your condition.    4. Please review your attached medication list, and, at your follow-up appointment advise your sleep clinic provider about any changes.    5. Go to [email or web address] for more information about your sleep problems.    Al Delgado, RPSGT, RRT  December 6, 2022

## 2022-12-11 PROBLEM — R10.13 DYSPEPSIA: Status: ACTIVE | Noted: 2022-08-26

## 2022-12-11 PROBLEM — F51.04 CHRONIC INSOMNIA: Status: ACTIVE | Noted: 2022-12-11

## 2022-12-11 PROBLEM — E66.09 CLASS 1 OBESITY DUE TO EXCESS CALORIES WITH SERIOUS COMORBIDITY AND BODY MASS INDEX (BMI) OF 31.0 TO 31.9 IN ADULT: Chronic | Status: ACTIVE | Noted: 2022-12-11

## 2022-12-11 PROBLEM — G47.33 OSA (OBSTRUCTIVE SLEEP APNEA): Chronic | Status: ACTIVE | Noted: 2022-12-11

## 2022-12-11 PROBLEM — E66.811 CLASS 1 OBESITY DUE TO EXCESS CALORIES WITH SERIOUS COMORBIDITY AND BODY MASS INDEX (BMI) OF 31.0 TO 31.9 IN ADULT: Chronic | Status: ACTIVE | Noted: 2022-12-11

## 2022-12-11 NOTE — PROCEDURES
" SLEEP STUDY INTERPRETATION  DIAGNOSTIC POLYSOMNOGRAPHY REPORT      Patient: CHEO NOWAK  YOB: 1965  Study Date: 12/5/2022  MRN: [id number]  Referring Provider: Amy Mendosa APRN, CNP  Ordering Provider: Rober Valencia PA-C    Indications for Polysomnography: The patient is a 57 year old Female who is 5' 3\" and weighs 175.0 lbs. Her BMI is 31.0, [place] sleepiness scale 5 and neck circumference is 34 cm. A diagnostic polysomnogram was performed to evaluate for  non-refreshing sleep, bruxism, morning dry mouth, daytime fatigue, difficulty maintaining sleep and crowded oropharynx. Snoring status is unknown as there is no bed partner.    Polysomnogram Data: A full night polysomnogram recorded the standard physiologic parameters including EEG, EOG, EMG, ECG, nasal and oral airflow. Respiratory parameters of chest and abdominal movements were recorded with respiratory inductance plethysmography. Oxygen saturation was recorded by pulse oximetry. Hypopnea scoring rule used: 1B 4%.    Sleep Architecture: A large percentage of N3 sleep was seen  The total recording time of the polysomnogram was 470.1 minutes. The total sleep time was 355.5 minutes. Sleep latency was increased at 34.3 minutes with the use of a sleep aid (zolpidem). REM latency was 105.5 minutes. Arousal index was increased at 34.8 arousals per hour. Sleep efficiency was decreased at 75.6%. Wake after sleep onset was 80.0 minutes. The patient spent 9.0% of total sleep time in Stage N1, 44.9% in Stage N2, 38.7% in Stage N3, and 7.5% in REM. Time in REM supine was 26.5 minutes.    Respiration:   Events ? The polysomnogram revealed a presence of 60 obstructive, 2 central, and 0 mixed apneas resulting in an apnea index of 10.5 events per hour. There were 29 obstructive hypopneas and 0 central hypopneas resulting in an obstructive hypopnea index of 4.9 and central hypopnea index of - events per hour. The combined apnea/hypopnea index was " 15.4 events per hour (central apnea/hypopnea index was 0.3 events per hour). The REM AHI was 67.9 events per hour. The supine AHI was 15.4 events per hour. The RERA index was 10.0 events per hour.  The RDI was 25.3 events per hour.    Snoring - was reported as loud.    Respiratory rate and pattern - was notable for normal respiratory rate and pattern.    Sustained Sleep Associated Hypoventilation - Transcutaneous carbon dioxide monitoring was not used, however significant hypoventilation was not suggested by oximetry    Sleep Associated Hypoxemia - (Greater than 5 minutes O2 sat at or below 88%) was present. Baseline oxygen saturation was 92.0%. Lowest oxygen saturation was 77.0%. Time spent less than or equal to 88% was 5.4 minutes. Time spent less than or equal to 89% was 10.7 minutes.    Movement Activity:     Periodic Limb Activity - There were 0 PLMs during the entire study.     REM EMG Activity - Excessive transient/sustained muscle activity was not present.    Nocturnal Behavior - Abnormal sleep related behaviors were not noted     Bruxism - None apparent.        Cardiac Summary:   The average pulse rate was 78.9 bpm. The minimum pulse rate was 51.0 bpm while the maximum pulse rate was 110.0 bpm.  Arrhythmias were not noted.          Assessment:     Moderate obstructive sleep apnea with hypoxemia in the supine position    Severity may be under-represented due to the paucity of REM sleep on this study    No lateral sleep was seen    Recommendations:    Based on the presence of moderate obstructive sleep apnea, treatment could be empirically initiated with Auto?titrating PAP therapy with a range of 5 to 15 cmH2O. Recommend clinical follow up with sleep management team.    Patient may be a candidate for dental appliance through referral to Sleep Dentistry for the treatment of obstructive sleep apnea and/or socially disruptive snoring.    If devices are not acceptable or effective, patient may benefit from  evaluation of possible surgical options. If she is interested, would recommend referral to specialized ENT-Sleep provider.    Advice regarding the risks of drowsy driving.    Suggest optimizing sleep schedule      Weight management (if BMI > 30).    Pharmacologic therapy should be used for management of restless legs syndrome only if present and clinically indicated and not based on the presence of periodic limb movements alone.    Diagnostic Codes:   Obstructive Sleep Apnea G47.33  Sleep Hypoxemia/Hypoventilation G47.36         _____________________________________   Electronically Signed By: Chaitanya Montoya MD 12/11/22           Range(%) Time in range (min)   0.0 - 89.0 10.7   0.0 - 88.0 5.4         Stage Min(mm Hg) Max(mm Hg)   Wake - -   NREM(1+2+3) - -   REM - -       Range(mmHg) Time in range (min)   55.0 - 100.0 -   Excluded data <20.0 & >65.0 470.5

## 2022-12-12 LAB — SLPCOMP: NORMAL

## 2022-12-15 ENCOUNTER — IMMUNIZATION (OUTPATIENT)
Dept: FAMILY MEDICINE | Facility: CLINIC | Age: 57
End: 2022-12-15
Payer: COMMERCIAL

## 2022-12-15 PROCEDURE — 90682 RIV4 VACC RECOMBINANT DNA IM: CPT

## 2022-12-15 PROCEDURE — 90471 IMMUNIZATION ADMIN: CPT

## 2022-12-19 NOTE — TELEPHONE ENCOUNTER
CONCERNS:  Congestion and cough lingering since the Monday after thanksgiving    DIET:  Milk - whole, 18  ounces/day  Solids - all  Water - city  Sippy cup-  yes       STOOLS: 1-2 / day    URINATION: 5-7 diapers / day    SLEEPING:      Naps - 1-2 hr / day      Night - 11 hr          Childcare plans:     Immunization Reactions: yes, high fever    GROWTH & DEVELOPMENT:  GROSS MOTOR:  Walks well:  yes  FINE MOTOR:  Charlotte 2 cubes: yes  LANGUAGE: 3 other words:  yes  PS.  Imitates activities:  yes       Attempts to use spoon:  Yes      Health Maintenance Due   Topic Date Due   • COVID-19 Vaccine (1) Never done   • Influenza Vaccine (1 of 2) Never done   • HIB Vaccine (3 of 3 - PRP-OMP Series) 09/18/2022   • Pneumococcal Vaccine 0-64 (4) 09/18/2022   • DTaP/Tdap/Td Vaccine (4 - DTaP) 12/18/2022   • Well Child Exam 15 Months  12/18/2022       Patient is due for topics as listed above but is not proceeding with Immunization(s) COVID-19, Dtap/Tdap/Td, HIB, Influenza and Pneumococcal at this time.          Patient calling again, wants to be called back today. Karey Curiel on 12/11/2020 at 3:31 PM

## 2023-01-02 ENCOUNTER — TELEPHONE (OUTPATIENT)
Dept: FAMILY MEDICINE | Facility: CLINIC | Age: 58
End: 2023-01-02

## 2023-01-02 NOTE — TELEPHONE ENCOUNTER
Patient Quality Outreach    Patient is due for the following:   Cervical Cancer Screening - PAP Needed  Physical Preventive Adult Physical      Topic Date Due     Hepatitis B Vaccine (1 of 3 - 3-dose series) Never done     Zoster (Shingles) Vaccine (1 of 2) Never done     Diptheria Tetanus Pertussis (DTAP/TDAP/TD) Vaccine (2 - Td or Tdap) 04/23/2018     COVID-19 Vaccine (4 - Booster for Moderna series) 03/03/2022       Next Steps:   Schedule a Adult Preventative    Type of outreach:    Sent MTPV message.    Next Steps:  Reach out within 90 days via MTPV.    Max number of attempts reached: No. Will try again in 90 days if patient still on fail list.    Questions for provider review:    None     Latoya Matias MA

## 2023-02-21 ASSESSMENT — SLEEP AND FATIGUE QUESTIONNAIRES
HOW LIKELY ARE YOU TO NOD OFF OR FALL ASLEEP WHILE SITTING AND TALKING TO SOMEONE: WOULD NEVER DOZE
HOW LIKELY ARE YOU TO NOD OFF OR FALL ASLEEP WHILE LYING DOWN TO REST IN THE AFTERNOON WHEN CIRCUMSTANCES PERMIT: MODERATE CHANCE OF DOZING
HOW LIKELY ARE YOU TO NOD OFF OR FALL ASLEEP WHILE SITTING QUIETLY AFTER LUNCH WITHOUT ALCOHOL: WOULD NEVER DOZE
HOW LIKELY ARE YOU TO NOD OFF OR FALL ASLEEP WHILE SITTING AND READING: WOULD NEVER DOZE
HOW LIKELY ARE YOU TO NOD OFF OR FALL ASLEEP WHEN YOU ARE A PASSENGER IN A CAR FOR AN HOUR WITHOUT A BREAK: WOULD NEVER DOZE
HOW LIKELY ARE YOU TO NOD OFF OR FALL ASLEEP IN A CAR, WHILE STOPPED FOR A FEW MINUTES IN TRAFFIC: WOULD NEVER DOZE
HOW LIKELY ARE YOU TO NOD OFF OR FALL ASLEEP WHILE SITTING INACTIVE IN A PUBLIC PLACE: WOULD NEVER DOZE
HOW LIKELY ARE YOU TO NOD OFF OR FALL ASLEEP WHILE WATCHING TV: SLIGHT CHANCE OF DOZING

## 2023-02-22 ENCOUNTER — VIRTUAL VISIT (OUTPATIENT)
Dept: SLEEP MEDICINE | Facility: CLINIC | Age: 58
End: 2023-02-22
Payer: COMMERCIAL

## 2023-02-22 VITALS — BODY MASS INDEX: 29.59 KG/M2 | HEIGHT: 63 IN | WEIGHT: 167 LBS

## 2023-02-22 DIAGNOSIS — G47.33 OBSTRUCTIVE SLEEP APNEA: Primary | ICD-10-CM

## 2023-02-22 PROCEDURE — 99213 OFFICE O/P EST LOW 20 MIN: CPT | Mod: VID | Performed by: PHYSICIAN ASSISTANT

## 2023-02-22 NOTE — NURSING NOTE
Is the patient currently in the state of [place]? YES    Visit mode:VIDEO    If the visit is dropped, the patient can be reconnected by: VIDEO VISIT: Send to e-mail at: [email or web address]    Will anyone else be joining the visit? NO      How would you like to obtain your AVS? MyChart    Are changes needed to the allergy or medication list? NO    Reason for visit: sleep study follow up

## 2023-02-22 NOTE — PATIENT INSTRUCTIONS
[facility]  Search engine: https://mms.aadsm.org/members/directory/search_bootstrap.php?org_id=ADSM&   Certified in Dental Sleep Medicine    Matthew Colorado  Degree: DDS  [address]  [address]  [place] [address]  Professional Phone: [phone]  Website: [email or web address]    Manuel Mendosa  Degree: DDS  [facility]  [address]  [address]  [place] [address]  Professional Phone: [phone]: [phone]    Pita Bernal  [facility]  [address]  [place] [address]  Professional Phone: [phone]  Website: [email or web address]      Rowdy Malhotra  Degree: DDS  [address]  [address]  [place] [address]  Professional Phone: [phone]  Fax: [phone]    Jesus Agrwaal  Degree: DDS  [facility]  [address]  [place] [address]  Professional Phone: [phone]  Website: [email or web address]      [facility]  [facility]  [address]  [address]  [place] [address]  Professional Phone: [phone]  Website: [email or web address]      Karey William  Degree: DDS  [facility]  [address]  [address]  [place] [address]  Professional Phone: [phone]     Noemi Siddiqui  Degree: DDS, PhD  [facility]  [address] [place] [address] [address] [place] [address]   [address] [address] [address],   [address]   [place] [address]   Appointments: [phone]   Fax: [phone]   [facility]   [address]   [address]   [place] [address]   Appointments: [phone]   Fax: [phone]                Akshat Kelley  Degree: DDS  [address] [place] [address]  Professional Phone: [phone]  Fax: [phone]  Website: [email or web address]      Jamieantwan Coon  Degree: TRISHA  [facility]  [address] [place] [address]    Miryamona Mulet Pradera  Degree: MS Jennifer RICO TMD, Oral Medicine, Dental Sleep [facility]  [address] [place] [address]  Professional Phone: [phone]      Rosio Matthews  Degree: MS TRISHA  [facility]  [address]  [address]  [place] [address]  Professional Phone: [phone]    Nitza Magana  Degree: TRISHA  [facility]  [address]  [address]  [place] [address]  [place] Office     Tera Benitez  Degree: TRISHA  [facility]  [address] [place] [address]  Professional Phone: [phone]  Website: [email or web address]      Wang Garcia  Degree: TRISHA  [facility]  [address] [place] [address]  Professional Phone: [phone]  Fax: [phone]      Ravinder Will  Degree: TRISHA Street Dental  [address]  [address]  [place] [address]                 ACCEPT MEDICARE  Gregg Negro DDS  [address] [facility] [address], [place] [address]  [phone]; [phone] (fax)  HomeJab    Raul Cook DDS, MS   [facility]   [address]   [address]   [place] [address]   Appointments: [phone]   Fax: [phone]     Linwood Kelley DDS   [address]   [place] [address]  Appointments [phone]      ADDITIONAL PROVIDERS    Meet Lackey DDS   [facility]   [address] [facility] [address] [facility],   [address]   [place] [address]   Appointments: [phone]   Fax: [phone]       Cj Keith DDS, MS [facility]  [address] [place] [address] [address]  [place] [address]   Appointments: [phone] Ext: [phone]  Fax: [phone]   [email or web address]

## 2023-02-22 NOTE — PROGRESS NOTES
Video-Visit Details    Type of service:  Video Visit    Video Start Time (time video started): 9:00 AM    Video End Time (time video stopped): 9:15 AM    Originating Location (pt. Location): Home        Distant Location (provider location):  On-site    Mode of Communication:  Video Conference via [facility]        Sleep Study Follow-Up Visit:    Date on this visit: 2/22/2023    Sangita Montano comes in today for follow-up of her sleep study done on 12/5/2022 at the [facility].  diagnostic polysomnogram was performed to evaluate for  non-refreshing sleep, bruxism, morning dry mouth, daytime fatigue, difficulty maintaining sleep and crowded oropharynx. Snoring status is unknown as there is no bed partner.     Polysomnogram as interpreted by Dr. Montoya:   Sleep Architecture: A large percentage of N3 sleep was seen  The total recording time of the polysomnogram was 470.1 minutes. The total sleep time was 355.5 minutes. Sleep latency was increased at 34.3 minutes with the use of a sleep aid (zolpidem). REM latency was 105.5 minutes. Arousal index was increased at 34.8 arousals per hour. Sleep efficiency was decreased at 75.6%. Wake after sleep onset was 80.0 minutes. The patient spent 9.0% of total sleep time in Stage N1, 44.9% in Stage N2, 38.7% in Stage N3, and 7.5% in REM. Time in REM supine was 26.5 minutes.     Respiration:   Events ? The polysomnogram revealed a presence of 60 obstructive, 2 central, and 0 mixed apneas resulting in an apnea index of 10.5 events per hour. There were 29 obstructive hypopneas and 0 central hypopneas resulting in an obstructive hypopnea index of 4.9 and central hypopnea index of - events per hour. The combined apnea/hypopnea index was 15.4 events per hour (central apnea/hypopnea index was 0.3 events per hour). The REM AHI was 67.9 events per hour. The supine AHI was 15.4 events per hour. The RERA index was 10.0 events per hour.  The RDI was 25.3  events per hour.    Snoring - was reported as loud.    Respiratory rate and pattern - was notable for normal respiratory rate and pattern.    Sustained Sleep Associated Hypoventilation - Transcutaneous carbon dioxide monitoring was not used, however significant hypoventilation was not suggested by oximetry    Sleep Associated Hypoxemia - (Greater than 5 minutes O2 sat at or below 88%) was present. Baseline oxygen saturation was 92.0%. Lowest oxygen saturation was 77.0%. Time spent less than or equal to 88% was 5.4 minutes. Time spent less than or equal to 89% was 10.7 minutes.     Movement Activity:     Periodic Limb Activity - There were 0 PLMs during the entire study.     REM EMG Activity - Excessive transient/sustained muscle activity was not present.    Nocturnal Behavior - Abnormal sleep related behaviors were not noted     Bruxism - None apparent.           Cardiac Summary:   The average pulse rate was 78.9 bpm. The minimum pulse rate was 51.0 bpm while the maximum pulse rate was 110.0 bpm.  Arrhythmias were not noted.    Past medical/surgical history, family history, social history, medications and allergies were reviewed.      Problem List:  Patient Active Problem List    Diagnosis Date Noted     NELLIE (obstructive sleep apnea)- moderate (AHI 15) 12/11/2022     Priority: Medium     12/5/2022 [facility] (175.0 lbs) - AHI 15.4, RDI 25.3, Supine AHI 15.4, REM AHI 67.9, Low O2 77.0%, Time Spent ?88% 5.4 minutes / Time Spent ?89% 10.7 minutes.       Dyspepsia 08/26/2022     Priority: Medium     CARDIOVASCULAR SCREENING; LDL GOAL LESS THAN 160 01/27/2015     Priority: Medium     Bladder cancer (H) 07/01/2013     Priority: Medium     2013. In remission.        Mechanical strabismus, unspecified 04/23/2008     Priority: Medium     Convergence insufficiency or palsy in binocular eye movement 04/27/2007     Priority: Medium     Class 1 obesity due to excess calories with serious comorbidity and body  mass index (BMI) of 31.0 to 31.9 in adult 12/11/2022     Priority: Low     Chronic insomnia 12/11/2022     Priority: Low        Impression/Plan:  Mild obstructive sleep apnea with sleep related hypoxemia in the supine position. Severity may be under-represented due to the paucity of REM sleep on this study. No lateral sleep was seen. Patient states that she sleeps exclusively in the supine position.      Polysomnogram was reviewed in detail today with the patient.  Treatment options discussed today including auto-CPAP, oral appliance therapy or surgical options.   Elected treatment with oral appliance therapy.  Sleep Dental referral placed.    She will follow up with me once the device is constructed.     24 minutes spent on day of encounter doing chart review,  history and exam, counseling, coordinating plan of care, documentation and further activities as noted above.      Rober Valencia PA-C  Sleep Medicine

## 2023-03-06 ENCOUNTER — OFFICE VISIT (OUTPATIENT)
Dept: OBGYN | Facility: CLINIC | Age: 58
End: 2023-03-06
Payer: COMMERCIAL

## 2023-03-06 DIAGNOSIS — N64.4 BREAST PAIN: Primary | ICD-10-CM

## 2023-03-06 DIAGNOSIS — M85.859 OSTEOPENIA OF NECK OF FEMUR, UNSPECIFIED LATERALITY: ICD-10-CM

## 2023-03-06 DIAGNOSIS — N63.11 MASS OF UPPER OUTER QUADRANT OF RIGHT BREAST: ICD-10-CM

## 2023-03-06 PROCEDURE — 99213 OFFICE O/P EST LOW 20 MIN: CPT | Mod: 25 | Performed by: OBSTETRICS & GYNECOLOGY

## 2023-03-06 PROCEDURE — 99396 PREV VISIT EST AGE 40-64: CPT | Performed by: OBSTETRICS & GYNECOLOGY

## 2023-03-06 NOTE — PROGRESS NOTES
SUBJECTIVE:   CC: Sangita Montano is an 58 year old woman who presents for preventive health visit.     She also has a concern today with some pain in her right breast. Reports 3 week history of pain in the upper outer quadrant. Denies any nipple discharge.     GYN History:  Not currently sexually active  Denies post menopausal bleeding       Patient has been advised of split billing requirements and indicates understanding: Yes  Healthy Habits:    Do you get at least three servings of calcium containing foods daily (dairy, green leafy vegetables, etc.)? yes    Amount of exercise or daily activities, outside of work: daily    Problems taking medications regularly No    Medication side effects: No      Today's PHQ-2 Score:   PHQ-2 ( 1999 Pfizer) 3/6/2023 5/5/2022   Q1: Little interest or pleasure in doing things 0 0   Q2: Feeling down, depressed or hopeless 0 0   PHQ-2 Score 0 0   PHQ-2 Total Score (12-17 Years)- Positive if 3 or more points; Administer PHQ-A if positive - -   Q1: Little interest or pleasure in doing things - Not at all   Q2: Feeling down, depressed or hopeless - Not at all   PHQ-2 Score - 0         Social History     Tobacco Use     Smoking status: Never     Smokeless tobacco: Never   Substance Use Topics     Alcohol use: No       FHS-7:   Breast CA Risk Assessment (FHS-7) 6/21/2022   Did any of your first-degree relatives have breast or ovarian cancer? No   Did any of your relatives have bilateral breast cancer? No   Did any man in your family have breast cancer? No   Did any woman in your family have breast and ovarian cancer? No   Did any woman in your family have breast cancer before age 50 y? No   Do you have 2 or more relatives with breast and/or ovarian cancer? No   Do you have 2 or more relatives with breast and/or bowel cancer? No       Pertinent mammograms are reviewed under the imaging tab.  Last mammogram 06/2022 BIRADS 1    History of abnormal Pap smear: NO - age 30-65 PAP every  5 years with negative HPV co-testing recommended  PAP / HPV Latest Ref Rng & Units 2019 2016 10/4/2013   PAP (Historical) - NIL NIL NIL   HPV16 NEG:Negative Negative - -   HPV18 NEG:Negative Negative - -   HRHPV NEG:Negative Negative - -     Reviewed and updated as needed this visit by clinical staff                  Reviewed and updated as needed this visit by Provider                 Past Medical History:   Diagnosis Date     Bladder cancer (H) 2013     Excessive or frequent menstruation 2006     Neoplasm of uncertain behavior of skin 2012      Past Surgical History:   Procedure Laterality Date     COLONOSCOPY N/A 3/2/2016    Procedure: COLONOSCOPY;  Surgeon: Bala Pineda MD;  Location: [facility]     CYSTOSCOPY, TRANSURETHRAL RESECTION (TUR) PROSTATE, COMBINED  7/3/2013    Procedure: COMBINED CYSTOSCOPY, TRANSURETHRAL RESECTION (TUR) PROSTATE;  Transurethral Resection of Bladder Cancer;  Surgeon: DONG Wright MD;  Location: [facility]     EYE SURGERY       OB History    Para Term  AB Living   0 0 0 0 0 0   SAB IAB Ectopic Multiple Live Births   0 0 0 0 0       ROS:  CONSTITUTIONAL: NEGATIVE for fever, chills, change in weight  INTEGUMENTARY/SKIN: NEGATIVE for worrisome rashes, moles or lesions  EYES: NEGATIVE for vision changes or irritation  ENT: NEGATIVE for ear, mouth and throat problems  RESP: NEGATIVE for significant cough or SOB  BREAST: see HPI  CV: NEGATIVE for chest pain, palpitations or peripheral edema  GI: NEGATIVE for nausea, abdominal pain, heartburn, or change in bowel habits  : NEGATIVE for unusual urinary or vaginal symptoms. No vaginal bleeding.  MUSCULOSKELETAL: NEGATIVE for significant arthralgias or myalgia  NEURO: NEGATIVE for weakness, dizziness or paresthesias  PSYCHIATRIC: NEGATIVE for changes in mood or affect     OBJECTIVE:   LMP 10/28/2019   EXAM:  General appearance: well-hydrated, A&O x 3, no apparent distress  Lungs: Equal expansion  "bilaterally, no accessory muscle use  Heart: No heaves or thrills.  Constitutional: See vitals  Extremities: no edema  Neuro: CN II-XII grossly intact  Genitourinary:  External genitalia: no erythema, no lesions.   Anus and Perineum: Unremarkable, no visible lesions  Vagina: Normal, healthy pink mucosa without any lesions. Physiologic vaginal discharge.   Cervix: normal appearance, no cervical motion tenderness.   Uterus: normal size, shape and consistency.   Adnexa: no masses or tenderness bilaterally.  Breast: breast lump palpated in right upper outer quadrant in area of pain, approximately 2-3cm      ASSESSMENT/PLAN:   Diagnoses and all orders for this visit:    Breast pain  -     MA Diagnostic Digital Bilateral; Future  -     US Breast Right Complete 4 Quadrants; Future    Osteopenia of neck of femur, unspecified laterality  -     DX Hip/Pelvis/Spine; Future    Mass of upper outer quadrant of right breast    Patient has a history of osteopenia. Has not required any treatment to date. She is due for repeat screening in 2023, this was ordered today. Discussed lifestyle modifications for treatment including weight bearing exercises. She is a non smoker.     Discussed early cervical cancer screening which she requested today, pap collected.     Breast imaging ordered as above due to complain of breast pain with associated finding of breast lump on exam. Will follow up as needed based on results.     COUNSELING:   Reviewed preventive health counseling, as reflected in patient instructions       Regular exercise       Healthy diet/nutrition       Osteoporosis prevention/bone health       Colorectal Cancer Screening    Estimated body mass index is 32.42 kg/m  as calculated from the following:    Height as of an earlier encounter on 3/6/23: 1.6 m (5' 3\").    Weight as of an earlier encounter on 3/6/23: 83 kg (183 lb).    Weight management plan: Discussed healthy diet and exercise guidelines. She sees a healthy lifestyle " clinic, endorses knowledge of proper diet.    She reports that she has never smoked. She has never used smokeless tobacco.        Yu Strong DO  [facility]

## 2023-03-10 ENCOUNTER — ANCILLARY PROCEDURE (OUTPATIENT)
Dept: MAMMOGRAPHY | Facility: CLINIC | Age: 58
End: 2023-03-10
Attending: OBSTETRICS & GYNECOLOGY
Payer: COMMERCIAL

## 2023-03-10 DIAGNOSIS — N64.4 BREAST PAIN: ICD-10-CM

## 2023-03-10 PROCEDURE — 77061 BREAST TOMOSYNTHESIS UNI: CPT | Mod: RT

## 2023-03-10 PROCEDURE — 76642 ULTRASOUND BREAST LIMITED: CPT | Mod: RT

## 2023-03-16 ENCOUNTER — OFFICE VISIT (OUTPATIENT)
Dept: UROLOGY | Facility: CLINIC | Age: 58
End: 2023-03-16
Payer: COMMERCIAL

## 2023-03-16 VITALS — DIASTOLIC BLOOD PRESSURE: 80 MMHG | RESPIRATION RATE: 16 BRPM | SYSTOLIC BLOOD PRESSURE: 152 MMHG | HEART RATE: 109 BPM

## 2023-03-16 DIAGNOSIS — Z85.51 PERSONAL HISTORY OF MALIGNANT NEOPLASM OF BLADDER: Primary | ICD-10-CM

## 2023-03-16 PROCEDURE — 52000 CYSTOURETHROSCOPY: CPT | Performed by: UROLOGY

## 2023-03-16 PROCEDURE — 88112 CYTOPATH CELL ENHANCE TECH: CPT | Performed by: PATHOLOGY

## 2023-03-16 NOTE — NURSING NOTE
"Initial BP (!) 152/80 (BP Location: Left arm, Patient Position: Chair, Cuff Size: Adult Regular)   Pulse 109   Resp 16   LMP 10/28/2019  Estimated body mass index is 32.42 kg/m  as calculated from the following:    Height as of 3/6/23: 1.6 m (5' 3\").    Weight as of 3/6/23: 83 kg (183 lb). .    Patient is here for a cysto.  janna lopez LPN    "

## 2023-03-16 NOTE — PROGRESS NOTES
CC: 59 yo female with LG Ta bladder cancer in 2013.  Had induction BCG and no recurrences since then.  Here for cystoscopy.    Cystoscopy: after informed consent was obtained, the patient was prepped and draped in standard sterile fashion. The flexible cystoscope was introduced into the patient's urethra without difficulty. There were no strictures in the urethra. Upon entering the bladder, the UOs were orthotopic and effluxing clear urine. There were no mucosal lesions, stones or foreign objects noted in the bladder. The remainder of the exam was normal.      ASSESSMENT AND PLAN: Over half of today's 25-minute visit was spent reviewing the chart, results and counseling the patient regarding her bladder cancer.  We are pleased to see no evidence of recurrence.  The patient was offered a cysto in 2 years, but she prefers to do it again in 1 year.  We will see her then.

## 2023-03-22 ENCOUNTER — TELEPHONE (OUTPATIENT)
Dept: UROLOGY | Facility: CLINIC | Age: 58
End: 2023-03-22
Payer: COMMERCIAL

## 2023-04-28 ENCOUNTER — OFFICE VISIT (OUTPATIENT)
Dept: OBGYN | Facility: CLINIC | Age: 58
End: 2023-04-28
Payer: COMMERCIAL

## 2023-04-28 VITALS
TEMPERATURE: 98.8 F | WEIGHT: 187.4 LBS | HEIGHT: 63 IN | SYSTOLIC BLOOD PRESSURE: 141 MMHG | BODY MASS INDEX: 33.2 KG/M2 | HEART RATE: 103 BPM | DIASTOLIC BLOOD PRESSURE: 82 MMHG | RESPIRATION RATE: 14 BRPM

## 2023-04-28 DIAGNOSIS — N95.0 POST-MENOPAUSAL BLEEDING: Primary | ICD-10-CM

## 2023-04-28 PROCEDURE — 58100 BIOPSY OF UTERUS LINING: CPT | Performed by: OBSTETRICS & GYNECOLOGY

## 2023-04-28 PROCEDURE — 88305 TISSUE EXAM BY PATHOLOGIST: CPT | Performed by: PATHOLOGY

## 2023-04-28 RX ORDER — CALCIUM CARBONATE 260MG(650)
TABLET,CHEWABLE ORAL
COMMUNITY
Start: 2022-04-04

## 2023-04-28 NOTE — NURSING NOTE
"Initial BP (!) 141/82 (BP Location: Right arm, Patient Position: Sitting, Cuff Size: Adult Regular)   Pulse 103   Temp 98.8  F (37.1  C) (Tympanic)   Resp 14   Ht 1.6 m (5' 3\")   Wt 85 kg (187 lb 6.4 oz)   LMP 10/28/2019   BMI 33.20 kg/m   Estimated body mass index is 33.2 kg/m  as calculated from the following:    Height as of this encounter: 1.6 m (5' 3\").    Weight as of this encounter: 85 kg (187 lb 6.4 oz). .      "

## 2023-04-28 NOTE — PROGRESS NOTES
[facility]    Gynecology Office Note    CC:   Chief Complaint   Patient presents with     Consult        HPI: Sangita Montano is a 58 year old  who presents for post menopausal bleeding. Reports having some more clear vaginal discharge for about two weeks, associated with breast tenderness. Then started bleeding like a period. Is on day #5 of bleeding and it has slowed down. Felt like a period.  Not the first time this has happened.    GYN Hx:     Patient is menopausal: yes    Patient's last menstrual period was 10/28/2019.    Last Pap Smear:   Lab Results   Component Value Date    PAP NIL 2019     ROS: A 10 pt ROS was completed and found to be otherwise negative unless mentioned in the HPI.     PMH:   Past Medical History:   Diagnosis Date     Bladder cancer (H) 2013     Excessive or frequent menstruation 2006     Neoplasm of uncertain behavior of skin 2012       PSHx:   Past Surgical History:   Procedure Laterality Date     COLONOSCOPY N/A 3/2/2016    Procedure: COLONOSCOPY;  Surgeon: Bala Pineda MD;  Location: [facility]     CYSTOSCOPY, TRANSURETHRAL RESECTION (TUR) PROSTATE, COMBINED  7/3/2013    Procedure: COMBINED CYSTOSCOPY, TRANSURETHRAL RESECTION (TUR) PROSTATE;  Transurethral Resection of Bladder Cancer;  Surgeon: DONG Wright MD;  Location: [facility]     EYE SURGERY         OBHx:   OB History    Para Term  AB Living   0 0 0 0 0 0   SAB IAB Ectopic Multiple Live Births   0 0 0 0 0       Medications:   ascorbic acid (VITAMIN C) 500 MG tablet, Take by mouth daily 9834-3467 mg daily  Cholecalciferol (VITAMIN D-3) 5000 UNITS TABS,   fish oil-omega-3 fatty acids 1000 MG capsule, Take 1,000 mg by mouth daily 3 tablets daily  MAGNESIUM GLYCINATE, 100 mg qd  multivitamin, therapeutic (THERA-VIT) TABS tablet, Take 1 tablet by mouth daily  NALTREXONE HCL PO, Take 4.5 mg by mouth daily  UNABLE TO FIND, MEDICATION NAME: Biotic 7  Zinc 10  MG LOZG,     No current facility-administered medications on file prior to visit.      Allergies:      Allergies   Allergen Reactions     Other Environmental Allergy Other (See Comments)     Airborne pollen: Runny nose, sneezing, and fatigue.  Processed meat (only tolerates fresh meat): Rash and migraine  Fermented foods: Rash and migraine  Vinegar: Rash and migraine     Penicillins Hives     Pollen Extract-Tree Extract [Pollen Extract] Other (See Comments)     Sneezing, fatigue, and runny nose     Sulfa Antibiotics Hives     Other Food Allergy Rash     migraine     Solanum Lycopersicum [Tomato] Rash     Migraine       Social History:   Social History     Socioeconomic History     Marital status: Single     Spouse name: Not on file     Number of children: Not on file     Years of education: Not on file     Highest education level: Not on file   Occupational History     Occupation: Investigator     Employer: [employer]   Tobacco Use     Smoking status: Never     Smokeless tobacco: Never   Vaping Use     Vaping status: Never Used     Passive vaping exposure: Yes   Substance and Sexual Activity     Alcohol use: No     Drug use: No     Sexual activity: Never   Other Topics Concern     Parent/sibling w/ CABG, MI or angioplasty before 65F 55M? Yes     Comment: Brother 46 and Father 37   Social History Narrative     Not on file     Social Determinants of Health     Financial Resource Strain: Not on file   Food Insecurity: Not on file   Transportation Needs: Not on file   Physical Activity: Not on file   Stress: Not on file   Social Connections: Not on file   Intimate Partner Violence: Not on file   Housing Stability: Not on file         Family History:   Family History   Problem Relation Age of Onset     Cerebrovascular Disease Mother      Hypertension Father      Cancer Father         bladder cancer,  liver cancer     Heart Disease Father      Heart Disease Brother 46        MI     Breast Cancer No family hx of        Physical  "Exam:   Vitals:    04/28/23 1345   BP: (!) 141/82   BP Location: Right arm   Patient Position: Sitting   Cuff Size: Adult Regular   Pulse: 103   Resp: 14   Temp: 98.8  F (37.1  C)   TempSrc: Tympanic   Weight: 85 kg (187 lb 6.4 oz)   Height: 1.6 m (5' 3\")      Estimated body mass index is 33.2 kg/m  as calculated from the following:    Height as of this encounter: 1.6 m (5' 3\").    Weight as of this encounter: 85 kg (187 lb 6.4 oz).    General appearance: well-hydrated, A&O x 3, no apparent distress  Lungs: Equal expansion bilaterally, no accessory muscle use  Heart: No heaves or thrills.   Constitutional: See vitals  Extremities: no edema  Neuro: CN II-XII grossly intact  Genitourinary:  External genitalia: no erythema, no lesions.   Anus and Perineum: Unremarkable, no visible lesions  Vagina: Normal, healthy pink mucosa without any lesions. Dark red vaginal discharge.   Cervix: normal appearance, no cervical motion tenderness.   Uterus: normal size      Endometrial Biopsy Procedure Note      Sangita Montano  1965  [id number]    Indications:    Sangita Montano is a 58 year old year old female, who is having an endometrial biopsy for post-menopausal bleeding    Procedure:  Is a pregnancy test required: No.    Prior to the start of the procedure, written and verbal consent was obtained from the patient. The patient was then placed in the dorsal lithotomy position.  A speculum was placed in the vagina and the cervix visualized. The cervix was cleaned with betadine swabs x3. A tenaculum was placed on the cervix. A small plastic 5 mm Pipelle syringe curette was passed through the cervix to the fundus with return of moderate amount of tissue. This was placed in specimen jar and sent for permanent pathology. All instruments were removed.      The patient tolerated the procedure fairly well.    Post Procedure:    PLAN : Await the results of the biopsy. There were no complications. Patient was discharged in " stable condition.    The patient was given post op instructions which included activity and pelvic restrictions.  She was advised to call the clinic if excessive bleeding, pelvic pain, or fever.     Follow-up based on results.       Assessment and Plan:     Encounter Diagnosis   Name Primary?     Post-menopausal bleeding Yes     EMB performed for PMB. Symptoms preceded by increase in vaginal discharge and breast tenderness, like a menses. Possible spontaneous ovulation. If EMB negative, anticipate no further evaluation at this time unless recurrent symptoms. Further procedures based on results.       Health maintenance: pap smear up to date    Return to clinic as needed and for annual exams.    Yu Strong DO

## 2023-05-02 LAB
PATH REPORT.COMMENTS IMP SPEC: NORMAL
PATH REPORT.COMMENTS IMP SPEC: NORMAL
PATH REPORT.FINAL DX SPEC: NORMAL
PATH REPORT.GROSS SPEC: NORMAL
PATH REPORT.MICROSCOPIC SPEC OTHER STN: NORMAL
PATH REPORT.RELEVANT HX SPEC: NORMAL
PHOTO IMAGE: NORMAL

## 2023-05-10 ENCOUNTER — PREP FOR PROCEDURE (OUTPATIENT)
Dept: OBGYN | Facility: CLINIC | Age: 58
End: 2023-05-10
Payer: COMMERCIAL

## 2023-05-10 ENCOUNTER — TELEPHONE (OUTPATIENT)
Dept: OBGYN | Facility: CLINIC | Age: 58
End: 2023-05-10
Payer: COMMERCIAL

## 2023-05-10 DIAGNOSIS — N84.0 ENDOMETRIAL POLYP: Primary | ICD-10-CM

## 2023-05-10 DIAGNOSIS — R10.2 PELVIC PAIN IN FEMALE: ICD-10-CM

## 2023-05-10 PROCEDURE — 99213 OFFICE O/P EST LOW 20 MIN: CPT | Mod: 95 | Performed by: OBSTETRICS & GYNECOLOGY

## 2023-05-10 RX ORDER — KETOROLAC TROMETHAMINE 30 MG/ML
30 INJECTION, SOLUTION INTRAMUSCULAR; INTRAVENOUS ONCE
Status: CANCELLED | OUTPATIENT
Start: 2023-05-10 | End: 2023-05-10

## 2023-05-10 RX ORDER — ACETAMINOPHEN 325 MG/1
975 TABLET ORAL ONCE
Status: CANCELLED | OUTPATIENT
Start: 2023-05-10 | End: 2023-05-10

## 2023-05-10 NOTE — TELEPHONE ENCOUNTER
OB/GYN Follow Up Telephone Visit:    Physician location: on site  Patient location: home    Called patient for results review and management planning.     Patient underwent EMB for post menopausal bleeding. EMB with benign results but showing endometrial polyp. Discussed results with patient. Option for expectant management and monitoring of symptoms vs. D&C with hysteroscopy. Discussed risks and benefits of both. Discussed risk of underlying malignancy and need for continued endometrial sampling with recurrent bleeding. Patient would like to move forward with the hysteroscopy with D&C. Discussed risks, benefits, and expected post operative course.     Patient does report new symptom of some pelvic pain, feels like pinching. Has had occasional nausea as well. Will plan to complete pelvic US prior to procedure.    Surgical considerations:  History of post operative nausea and vomiting. Did fine with her colonoscopy. Will notify anesthesia. Consider Zofran for discharge depending on how recovery is going.   Plan for surgery on Wednesday, rest of week off of work.    Phone call duration: 19 minutes      Yu Strong DO

## 2023-05-11 ENCOUNTER — TELEPHONE (OUTPATIENT)
Dept: OBGYN | Facility: CLINIC | Age: 58
End: 2023-05-11
Payer: COMMERCIAL

## 2023-05-11 NOTE — TELEPHONE ENCOUNTER
"[id number]  Sangita Montano    You are now scheduled for surgery at [facility].  Below are the details for your surgery.  Please read the \"Preparing for Your Surgery\" instructions and let us know if you have any questions.    Type of surgery: HYSTEROSCOPY, WITH DILATION AND CURETTAGE OF UTERUS     Surgeon:  Yu Strong DO  Location of surgery: [facility] OR    Date of surgery: 5-24-23    Time: 7:30am   Arrival Time: 6:30am    Time can change, to be confirmed a couple of days prior by pre-op surgery nurse.    Pre-Op Appt Date: Patient to schedule with a PCP or Family Practice Provider within 30 days to the surgery.  Post-Op Appt Date:    Time:     Packet sent out: Yes  Pre-cert/Authorization completed:  TBD by Financial Securing Office.   MA Sterilization/Hysterectomy Acknowledgment Consent signed: Not Applicable    [facility]  [phone]    Fax: [phone]  Same Day Surgery [phone]  Fax: [phone]  [facility] [phone]    "

## 2023-05-12 ENCOUNTER — HOSPITAL ENCOUNTER (OUTPATIENT)
Dept: ULTRASOUND IMAGING | Facility: CLINIC | Age: 58
Discharge: HOME | End: 2023-05-12
Attending: OBSTETRICS & GYNECOLOGY | Admitting: OBSTETRICS & GYNECOLOGY
Payer: COMMERCIAL

## 2023-05-12 DIAGNOSIS — R10.2 PELVIC PAIN IN FEMALE: ICD-10-CM

## 2023-05-12 DIAGNOSIS — N84.0 ENDOMETRIAL POLYP: ICD-10-CM

## 2023-05-12 PROCEDURE — 76856 US EXAM PELVIC COMPLETE: CPT

## 2023-05-18 ENCOUNTER — OFFICE VISIT (OUTPATIENT)
Dept: FAMILY MEDICINE | Facility: CLINIC | Age: 58
End: 2023-05-18
Payer: COMMERCIAL

## 2023-05-18 ENCOUNTER — LAB (OUTPATIENT)
Dept: LAB | Facility: CLINIC | Age: 58
End: 2023-05-18
Payer: COMMERCIAL

## 2023-05-18 VITALS
HEART RATE: 97 BPM | WEIGHT: 184 LBS | HEIGHT: 63 IN | DIASTOLIC BLOOD PRESSURE: 88 MMHG | SYSTOLIC BLOOD PRESSURE: 150 MMHG | TEMPERATURE: 98.1 F | OXYGEN SATURATION: 97 % | BODY MASS INDEX: 32.6 KG/M2 | RESPIRATION RATE: 14 BRPM

## 2023-05-18 DIAGNOSIS — R03.0 WHITE COAT SYNDROME WITHOUT DIAGNOSIS OF HYPERTENSION: ICD-10-CM

## 2023-05-18 DIAGNOSIS — R03.0 ELEVATED BLOOD PRESSURE READING WITHOUT DIAGNOSIS OF HYPERTENSION: ICD-10-CM

## 2023-05-18 DIAGNOSIS — Z01.818 PREOPERATIVE EXAMINATION: Primary | ICD-10-CM

## 2023-05-18 DIAGNOSIS — Z11.59 NEED FOR HEPATITIS C SCREENING TEST: ICD-10-CM

## 2023-05-18 DIAGNOSIS — Z11.4 SCREENING FOR HIV (HUMAN IMMUNODEFICIENCY VIRUS): ICD-10-CM

## 2023-05-18 DIAGNOSIS — C67.2 MALIGNANT NEOPLASM OF LATERAL WALL OF URINARY BLADDER (H): ICD-10-CM

## 2023-05-18 DIAGNOSIS — N95.0 ABNORMAL VAGINAL BLEEDING IN POSTMENOPAUSAL PATIENT: ICD-10-CM

## 2023-05-18 DIAGNOSIS — Z86.39 HISTORY OF HASHIMOTO THYROIDITIS: ICD-10-CM

## 2023-05-18 LAB
ALBUMIN SERPL BCG-MCNC: 4.5 G/DL (ref 3.5–5.2)
ALP SERPL-CCNC: 65 U/L (ref 35–104)
ALT SERPL W P-5'-P-CCNC: 33 U/L (ref 10–35)
ANION GAP SERPL CALCULATED.3IONS-SCNC: 11 MMOL/L (ref 7–15)
AST SERPL W P-5'-P-CCNC: 30 U/L (ref 10–35)
BILIRUB SERPL-MCNC: 0.4 MG/DL
BUN SERPL-MCNC: 16.6 MG/DL (ref 8–23)
CALCIUM SERPL-MCNC: 10.4 MG/DL (ref 8.6–10)
CHLORIDE SERPL-SCNC: 104 MMOL/L (ref 98–107)
CREAT SERPL-MCNC: 0.79 MG/DL (ref 0.51–0.95)
DEPRECATED HCO3 PLAS-SCNC: 26 MMOL/L (ref 22–29)
ERYTHROCYTE [DISTWIDTH] IN BLOOD BY AUTOMATED COUNT: 13.7 % (ref 10–15)
GFR SERPL CREATININE-BSD FRML MDRD: 86 ML/MIN/1.73M2
GLUCOSE SERPL-MCNC: 108 MG/DL (ref 70–99)
HCT VFR BLD AUTO: 44.3 % (ref 35–47)
HGB BLD-MCNC: 14.2 G/DL (ref 11.7–15.7)
MCH RBC QN AUTO: 28.6 PG (ref 26.5–33)
MCHC RBC AUTO-ENTMCNC: 32.1 G/DL (ref 31.5–36.5)
MCV RBC AUTO: 89 FL (ref 78–100)
PLATELET # BLD AUTO: 279 10E3/UL (ref 150–450)
POTASSIUM SERPL-SCNC: 4.3 MMOL/L (ref 3.4–5.3)
PROT SERPL-MCNC: 7.3 G/DL (ref 6.4–8.3)
RBC # BLD AUTO: 4.97 10E6/UL (ref 3.8–5.2)
SODIUM SERPL-SCNC: 141 MMOL/L (ref 136–145)
TSH SERPL DL<=0.005 MIU/L-ACNC: 1.04 UIU/ML (ref 0.3–4.2)
WBC # BLD AUTO: 7.8 10E3/UL (ref 4–11)

## 2023-05-18 PROCEDURE — 99214 OFFICE O/P EST MOD 30 MIN: CPT | Performed by: NURSE PRACTITIONER

## 2023-05-18 PROCEDURE — 80053 COMPREHEN METABOLIC PANEL: CPT

## 2023-05-18 PROCEDURE — 87389 HIV-1 AG W/HIV-1&-2 AB AG IA: CPT

## 2023-05-18 PROCEDURE — 85027 COMPLETE CBC AUTOMATED: CPT

## 2023-05-18 PROCEDURE — 84443 ASSAY THYROID STIM HORMONE: CPT

## 2023-05-18 PROCEDURE — 36415 COLL VENOUS BLD VENIPUNCTURE: CPT

## 2023-05-18 PROCEDURE — 86803 HEPATITIS C AB TEST: CPT

## 2023-05-18 RX ORDER — LISINOPRIL 10 MG/1
10 TABLET ORAL DAILY
Qty: 90 TABLET | Refills: 0 | Status: SHIPPED | OUTPATIENT
Start: 2023-05-18 | End: 2023-12-01

## 2023-05-18 RX ORDER — DIPHENHYDRAMINE HCL 25 MG
25 CAPSULE ORAL EVERY 4 HOURS PRN
COMMUNITY
End: 2023-12-01

## 2023-05-18 ASSESSMENT — PAIN SCALES - GENERAL: PAINLEVEL: NO PAIN (0)

## 2023-05-18 NOTE — H&P (VIEW-ONLY)
[facility]  [id number]  [facility] [address]  Phone: [phone]  Primary Provider: Alma Delia Burns  Pre-op Performing Provider: TRIXIE BLACKMAN      PREOPERATIVE EVALUATION:  Today's date: 5/18/2023    Sangita Montano is a 58 year old female who presents for a preoperative evaluation.      5/18/2023    10:00 AM   Additional Questions   Roomed by Latoya BOLTON CMA   Accompanied by self     Surgical Information:  Surgery/Procedure: HYSTEROSCOPY, WITH DILATION AND CURETTAGE OF UTERUS  Surgery Location: [facility] [place]  Surgeon: Yu Strong DO  Surgery Date: 5/24/23  Time of Surgery: 7:30am  Where patient plans to recover: At home with family  Fax number for surgical facility: Note does not need to be faxed, will be available electronically in Epic.    Assessment & Plan     The proposed surgical procedure is considered INTERMEDIATE risk.    Preoperative examination  Abnormal vaginal bleeding in postmenopausal patient  Preoperative exam for hysteroscopy due to abnormal vaginal bleeding in a postmenopausal patient.  Low risk for complications regarding the surgery.  No significant cardiac history.  Plan proceeding with procedure without further clinical clarification.    White coat syndrome without diagnosis of hypertension  Elevated blood pressure reading without diagnosis of hypertension  In review of patient's history of hypertension when seen in clinic I did recommend that we start a low-dose blood pressure medication and complete some blood work.  She is in agreement this plan.  I will start patient on lisinopril 10 mg daily.  Reviewed risks and benefits of medication as well as potential side effects.  She will follow-up with me for reevaluation in a few weeks.  - Comprehensive metabolic panel (BMP + Alb, Alk Phos, ALT, AST, Total. Bili, TP); Future  - TSH with free T4 reflex; Future  - CBC with platelets; Future  - lisinopril (ZESTRIL) 10 MG tablet;  Take 1 tablet (10 mg) by mouth daily    History of Hashimoto thyroiditis  We will recheck TSH today.  - TSH with free T4 reflex; Future    Malignant neoplasm of lateral wall of urinary bladder (H)  Has been cancer free since 2013.  Follows with urology on a yearly basis.    Screening for HIV (human immunodeficiency virus)  - HIV Antigen Antibody Combo; Future    Need for hepatitis C screening test  - Hepatitis C Screen Reflex to HCV RNA Quant and Genotype; Future       - No identified additional risk factors other than previously addressed    Antiplatelet or Anticoagulation Medication Instructions:   - Patient is on no antiplatelet or anticoagulation medications.    Additional Medication Instructions:  Advised to hold: Lisinopril and Naltrexone day of procedure/ 11 hours.     RECOMMENDATION:  APPROVAL GIVEN to proceed with proposed procedure, without further diagnostic evaluation.            Subjective       HPI related to upcoming procedure: had unexpected post menopausal vaginal bleeding. She had some associated PMS symptoms in addition to the bleeding. Biopsy completed and showed polyp tissue, Ultrasound did not show any changes. Recommended further evaluation for cause of bleeding.         5/18/2023     9:00 AM   Preop Questions   1. Have you ever had a heart attack or stroke? No   2. Have you ever had surgery on your heart or blood vessels, such as a stent placement, a coronary artery bypass, or surgery on an artery in your head, neck, heart, or legs? No   3. Do you have chest pain with activity? No   4. Do you have a history of  heart failure? No   5. Do you currently have a cold, bronchitis or symptoms of other infection? No   6. Do you have a cough, shortness of breath, or wheezing? No   7. Do you or anyone in your family have previous history of blood clots? No   8. Do you or does anyone in your family have a serious bleeding problem such as prolonged bleeding following surgeries or cuts? No   9. Have you  ever had problems with anemia or been told to take iron pills? No   10. Have you had any abnormal blood loss such as black, tarry or bloody stools, or abnormal vaginal bleeding? YES - abnormal vaginal bleeding   11. Have you ever had a blood transfusion? No   12. Are you willing to have a blood transfusion if it is medically needed before, during, or after your surgery? Yes   13. Have you or any of your relatives ever had problems with anesthesia? YES - self- hard time waking up   14. Do you have sleep apnea, excessive snoring or daytime drowsiness? YES - sleep apnea   14a. Do you have a CPAP machine? No   15. Do you have any artifical heart valves or other implanted medical devices like a pacemaker, defibrillator, or continuous glucose monitor? No   16. Do you have artificial joints? No   17. Are you allergic to latex? No   18. Is there any chance that you may be pregnant? No       Health Care Directive:  Patient does not have a Health Care Directive or Living Will: Patient states has Advance Directive and will bring in a copy to clinic.    Preoperative Review of :   reviewed - no record of controlled substances prescribed.      Status of Chronic Conditions:  See problem list for active medical problems.  Problems all longstanding and stable, except as noted/documented.  See ROS for pertinent symptoms related to these conditions.    Has white coat hypertension. She has not been prescribed BP medications in the past.     History of stage 0 bladder cancer. 10 years post. Follows yearly with urology.     Review of Systems  CONSTITUTIONAL: NEGATIVE for fever, chills, change in weight  INTEGUMENTARY/SKIN: NEGATIVE for worrisome rashes, moles or lesions  EYES: NEGATIVE for vision changes or irritation  ENT/MOUTH: NEGATIVE for ear, mouth and throat problems  RESP: NEGATIVE for significant cough or SOB  CV: NEGATIVE for chest pain, palpitations or peripheral edema  GI: NEGATIVE for nausea, abdominal pain, heartburn,  or change in bowel habits  : NEGATIVE for frequency, dysuria, or hematuria  MUSCULOSKELETAL: NEGATIVE for significant arthralgias or myalgia  NEURO: NEGATIVE for weakness, dizziness or paresthesias  ENDOCRINE: NEGATIVE for temperature intolerance, skin/hair changes  HEME: NEGATIVE for bleeding problems  PSYCHIATRIC: NEGATIVE for changes in mood or affect    Patient Active Problem List    Diagnosis Date Noted     White coat syndrome without diagnosis of hypertension 05/18/2023     Priority: Medium     NELLIE (obstructive sleep apnea)- moderate (AHI 15) 12/11/2022     Priority: Medium     12/5/2022 [place] [facility] Study (175.0 lbs) - AHI 15.4, RDI 25.3, Supine AHI 15.4, REM AHI 67.9, Low O2 77.0%, Time Spent ?88% 5.4 minutes / Time Spent ?89% 10.7 minutes.       Dyspepsia 08/26/2022     Priority: Medium     CARDIOVASCULAR SCREENING; LDL GOAL LESS THAN 160 01/27/2015     Priority: Medium     Bladder cancer (H) 07/01/2013     Priority: Medium     2013. In remission.        Mechanical strabismus, unspecified 04/23/2008     Priority: Medium     Convergence insufficiency or palsy in binocular eye movement 04/27/2007     Priority: Medium     Class 1 obesity due to excess calories with serious comorbidity and body mass index (BMI) of 31.0 to 31.9 in adult 12/11/2022     Priority: Low     Chronic insomnia 12/11/2022     Priority: Low      Past Medical History:   Diagnosis Date     Bladder cancer (H) 07/2013     Excessive or frequent menstruation 03/25/2006     Neoplasm of uncertain behavior of skin 03/29/2012     Past Surgical History:   Procedure Laterality Date     COLONOSCOPY N/A 3/2/2016    Procedure: COLONOSCOPY;  Surgeon: Bala Pineda MD;  Location: [facility]     CYSTOSCOPY, TRANSURETHRAL RESECTION (TUR) PROSTATE, COMBINED  7/3/2013    Procedure: COMBINED CYSTOSCOPY, TRANSURETHRAL RESECTION (TUR) PROSTATE;  Transurethral Resection of Bladder Cancer;  Surgeon: DONG Wright MD;  Location: [facility]     EYE  SURGERY       Current Outpatient Medications   Medication Sig Dispense Refill     ascorbic acid (VITAMIN C) 500 MG tablet Take by mouth daily 0205-8708 mg daily       Cholecalciferol (VITAMIN D-3) 5000 UNITS TABS        diphenhydrAMINE (BENADRYL) 25 MG capsule Take 25 mg by mouth every 4 hours as needed for itching or allergies D- hist       fish oil-omega-3 fatty acids 1000 MG capsule Take 1,000 mg by mouth daily 3 tablets daily       lisinopril (ZESTRIL) 10 MG tablet Take 1 tablet (10 mg) by mouth daily 90 tablet 0     MAGNESIUM GLYCINATE 100 mg qd       multivitamin, therapeutic (THERA-VIT) TABS tablet Take 1 tablet by mouth daily       NALTREXONE HCL PO Take 4.5 mg by mouth daily       UNABLE TO FIND MEDICATION NAME: Biotic 7       Zinc 10 MG LOZG          Allergies   Allergen Reactions     Other Environmental Allergy Other (See Comments)     Airborne pollen: Runny nose, sneezing, and fatigue.  Processed meat (only tolerates fresh meat): Rash and migraine  Fermented foods: Rash and migraine  Vinegar: Rash and migraine     Penicillins Hives     Pollen Extract-Tree Extract [Pollen Extract] Other (See Comments)     Sneezing, fatigue, and runny nose     Sulfa Antibiotics Hives     Other Food Allergy Rash     migraine     Solanum Lycopersicum [Tomato] Rash     Migraine        Social History     Tobacco Use     Smoking status: Never     Smokeless tobacco: Never   Vaping Use     Vaping status: Never Used     Passive vaping exposure: Yes   Substance Use Topics     Alcohol use: No     Family History   Problem Relation Age of Onset     Cerebrovascular Disease Mother      Hypertension Father      Cancer Father         bladder cancer,  liver cancer     Heart Disease Father      Other Cancer Father         Bladder Cancer     Heart Disease Brother 46        MI     Diabetes Brother      Coronary Artery Disease Brother      Breast Cancer No family hx of      History   Drug Use No         Objective     BP (!) 150/88 (BP  "Location: Right arm, Patient Position: Sitting, Cuff Size: Adult Large)   Pulse 97   Temp 98.1  F (36.7  C) (Tympanic)   Resp 14   Ht 1.6 m (5' 3\")   Wt 83.5 kg (184 lb)   LMP 10/28/2019   SpO2 97%   BMI 32.59 kg/m      Physical Exam    GENERAL APPEARANCE: healthy, alert and no distress     EYES: EOMI, PERRL     HENT: ear canals and TM's normal and nose and mouth without ulcers or lesions     NECK: no adenopathy, no asymmetry, masses, or scars and thyroid normal to palpation     RESP: lungs clear to auscultation - no rales, rhonchi or wheezes     CV: regular rates and rhythm, normal S1 S2, no S3 or S4 and no murmur, click or rub     ABDOMEN:  soft, nontender, no HSM or masses and bowel sounds normal     MS: extremities normal- no gross deformities noted, no evidence of inflammation in joints, FROM in all extremities.     SKIN: no suspicious lesions or rashes     NEURO: Normal strength and tone, sensory exam grossly normal, mentation intact and speech normal     PSYCH: mentation appears normal. and affect normal/bright     LYMPHATICS: No cervical adenopathy    No results for input(s): HGB, PLT, INR, NA, POTASSIUM, CR, A1C in the last [id number] hours.     Diagnostics:  No labs were ordered during this visit.   No EKG required, no history of coronary heart disease, significant arrhythmia, peripheral arterial disease or other structural heart disease.    Revised Cardiac Risk Index (RCRI):  The patient has the following serious cardiovascular risks for perioperative complications:   - No serious cardiac risks = 0 points     RCRI Interpretation: 0 points: Class I (very low risk - 0.4% complication rate)           Signed Electronically by: THA Varma CNP  Copy of this evaluation report is provided to requesting physician.      "

## 2023-05-18 NOTE — PROGRESS NOTES
[facility]  [address]  [facility] [address]  Phone: [phone]  Primary Provider: Alma Delia Burns  Pre-op Performing Provider: TRIXIE BLACKMAN      PREOPERATIVE EVALUATION:  Today's date: 5/18/2023    Sangita Montano is a 58 year old female who presents for a preoperative evaluation.      5/18/2023    10:00 AM   Additional Questions   Roomed by Latoya BOLTON CMA   Accompanied by self     Surgical Information:  Surgery/Procedure: HYSTEROSCOPY, WITH DILATION AND CURETTAGE OF UTERUS  Surgery Location: [facility] [place]  Surgeon: Yu Strong DO  Surgery Date: 5/24/23  Time of Surgery: 7:30am  Where patient plans to recover: At home with family  Fax number for surgical facility: Note does not need to be faxed, will be available electronically in Epic.    Assessment & Plan     The proposed surgical procedure is considered INTERMEDIATE risk.    Preoperative examination  Abnormal vaginal bleeding in postmenopausal patient  Preoperative exam for hysteroscopy due to abnormal vaginal bleeding in a postmenopausal patient.  Low risk for complications regarding the surgery.  No significant cardiac history.  Plan proceeding with procedure without further clinical clarification.    White coat syndrome without diagnosis of hypertension  Elevated blood pressure reading without diagnosis of hypertension  In review of patient's history of hypertension when seen in clinic I did recommend that we start a low-dose blood pressure medication and complete some blood work.  She is in agreement this plan.  I will start patient on lisinopril 10 mg daily.  Reviewed risks and benefits of medication as well as potential side effects.  She will follow-up with me for reevaluation in a few weeks.  - Comprehensive metabolic panel (BMP + Alb, Alk Phos, ALT, AST, Total. Bili, TP); Future  - TSH with free T4 reflex; Future  - CBC with platelets; Future  - lisinopril (ZESTRIL) 10 MG tablet;  Take 1 tablet (10 mg) by mouth daily    History of Hashimoto thyroiditis  We will recheck TSH today.  - TSH with free T4 reflex; Future    Malignant neoplasm of lateral wall of urinary bladder (H)  Has been cancer free since 2013.  Follows with urology on a yearly basis.    Screening for HIV (human immunodeficiency virus)  - HIV Antigen Antibody Combo; Future    Need for hepatitis C screening test  - Hepatitis C Screen Reflex to HCV RNA Quant and Genotype; Future       - No identified additional risk factors other than previously addressed    Antiplatelet or Anticoagulation Medication Instructions:   - Patient is on no antiplatelet or anticoagulation medications.    Additional Medication Instructions:  Advised to hold: Lisinopril and Naltrexone day of procedure/ 11 hours.     RECOMMENDATION:  APPROVAL GIVEN to proceed with proposed procedure, without further diagnostic evaluation.            Subjective       HPI related to upcoming procedure: had unexpected post menopausal vaginal bleeding. She had some associated PMS symptoms in addition to the bleeding. Biopsy completed and showed polyp tissue, Ultrasound did not show any changes. Recommended further evaluation for cause of bleeding.         5/18/2023     9:00 AM   Preop Questions   1. Have you ever had a heart attack or stroke? No   2. Have you ever had surgery on your heart or blood vessels, such as a stent placement, a coronary artery bypass, or surgery on an artery in your head, neck, heart, or legs? No   3. Do you have chest pain with activity? No   4. Do you have a history of  heart failure? No   5. Do you currently have a cold, bronchitis or symptoms of other infection? No   6. Do you have a cough, shortness of breath, or wheezing? No   7. Do you or anyone in your family have previous history of blood clots? No   8. Do you or does anyone in your family have a serious bleeding problem such as prolonged bleeding following surgeries or cuts? No   9. Have you  ever had problems with anemia or been told to take iron pills? No   10. Have you had any abnormal blood loss such as black, tarry or bloody stools, or abnormal vaginal bleeding? YES - abnormal vaginal bleeding   11. Have you ever had a blood transfusion? No   12. Are you willing to have a blood transfusion if it is medically needed before, during, or after your surgery? Yes   13. Have you or any of your relatives ever had problems with anesthesia? YES - self- hard time waking up   14. Do you have sleep apnea, excessive snoring or daytime drowsiness? YES - sleep apnea   14a. Do you have a CPAP machine? No   15. Do you have any artifical heart valves or other implanted medical devices like a pacemaker, defibrillator, or continuous glucose monitor? No   16. Do you have artificial joints? No   17. Are you allergic to latex? No   18. Is there any chance that you may be pregnant? No       Health Care Directive:  Patient does not have a Health Care Directive or Living Will: Patient states has Advance Directive and will bring in a copy to clinic.    Preoperative Review of :   reviewed - no record of controlled substances prescribed.      Status of Chronic Conditions:  See problem list for active medical problems.  Problems all longstanding and stable, except as noted/documented.  See ROS for pertinent symptoms related to these conditions.    Has white coat hypertension. She has not been prescribed BP medications in the past.     History of stage 0 bladder cancer. 10 years post. Follows yearly with urology.     Review of Systems  CONSTITUTIONAL: NEGATIVE for fever, chills, change in weight  INTEGUMENTARY/SKIN: NEGATIVE for worrisome rashes, moles or lesions  EYES: NEGATIVE for vision changes or irritation  ENT/MOUTH: NEGATIVE for ear, mouth and throat problems  RESP: NEGATIVE for significant cough or SOB  CV: NEGATIVE for chest pain, palpitations or peripheral edema  GI: NEGATIVE for nausea, abdominal pain, heartburn,  or change in bowel habits  : NEGATIVE for frequency, dysuria, or hematuria  MUSCULOSKELETAL: NEGATIVE for significant arthralgias or myalgia  NEURO: NEGATIVE for weakness, dizziness or paresthesias  ENDOCRINE: NEGATIVE for temperature intolerance, skin/hair changes  HEME: NEGATIVE for bleeding problems  PSYCHIATRIC: NEGATIVE for changes in mood or affect    Patient Active Problem List    Diagnosis Date Noted     White coat syndrome without diagnosis of hypertension 05/18/2023     Priority: Medium     NELLIE (obstructive sleep apnea)- moderate (AHI 15) 12/11/2022     Priority: Medium     12/5/2022 Chloride Diagnostic Sleep Study (175.0 lbs) - AHI 15.4, RDI 25.3, Supine AHI 15.4, REM AHI 67.9, Low O2 77.0%, Time Spent ?88% 5.4 minutes / Time Spent ?89% 10.7 minutes.       Dyspepsia 08/26/2022     Priority: Medium     CARDIOVASCULAR SCREENING; LDL GOAL LESS THAN 160 01/27/2015     Priority: Medium     Bladder cancer (H) 07/01/2013     Priority: Medium     2013. In remission.        Mechanical strabismus, unspecified 04/23/2008     Priority: Medium     Convergence insufficiency or palsy in binocular eye movement 04/27/2007     Priority: Medium     Class 1 obesity due to excess calories with serious comorbidity and body mass index (BMI) of 31.0 to 31.9 in adult 12/11/2022     Priority: Low     Chronic insomnia 12/11/2022     Priority: Low      Past Medical History:   Diagnosis Date     Bladder cancer (H) 07/2013     Excessive or frequent menstruation 03/25/2006     Neoplasm of uncertain behavior of skin 03/29/2012     Past Surgical History:   Procedure Laterality Date     COLONOSCOPY N/A 3/2/2016    Procedure: COLONOSCOPY;  Surgeon: Bala Pineda MD;  Location: [facility]     CYSTOSCOPY, TRANSURETHRAL RESECTION (TUR) PROSTATE, COMBINED  7/3/2013    Procedure: COMBINED CYSTOSCOPY, TRANSURETHRAL RESECTION (TUR) PROSTATE;  Transurethral Resection of Bladder Cancer;  Surgeon: DONG Wright MD;  Location: [facility]     EYE  SURGERY       Current Outpatient Medications   Medication Sig Dispense Refill     ascorbic acid (VITAMIN C) 500 MG tablet Take by mouth daily 0863-4696 mg daily       Cholecalciferol (VITAMIN D-3) 5000 UNITS TABS        diphenhydrAMINE (BENADRYL) 25 MG capsule Take 25 mg by mouth every 4 hours as needed for itching or allergies D- hist       fish oil-omega-3 fatty acids 1000 MG capsule Take 1,000 mg by mouth daily 3 tablets daily       lisinopril (ZESTRIL) 10 MG tablet Take 1 tablet (10 mg) by mouth daily 90 tablet 0     MAGNESIUM GLYCINATE 100 mg qd       multivitamin, therapeutic (THERA-VIT) TABS tablet Take 1 tablet by mouth daily       NALTREXONE HCL PO Take 4.5 mg by mouth daily       UNABLE TO FIND MEDICATION NAME: Biotic 7       Zinc 10 MG LOZG          Allergies   Allergen Reactions     Other Environmental Allergy Other (See Comments)     Airborne pollen: Runny nose, sneezing, and fatigue.  Processed meat (only tolerates fresh meat): Rash and migraine  Fermented foods: Rash and migraine  Vinegar: Rash and migraine     Penicillins Hives     Pollen Extract-Tree Extract [Pollen Extract] Other (See Comments)     Sneezing, fatigue, and runny nose     Sulfa Antibiotics Hives     Other Food Allergy Rash     migraine     Solanum Lycopersicum [Tomato] Rash     Migraine        Social History     Tobacco Use     Smoking status: Never     Smokeless tobacco: Never   Vaping Use     Vaping status: Never Used     Passive vaping exposure: Yes   Substance Use Topics     Alcohol use: No     Family History   Problem Relation Age of Onset     Cerebrovascular Disease Mother      Hypertension Father      Cancer Father         bladder cancer,  liver cancer     Heart Disease Father      Other Cancer Father         Bladder Cancer     Heart Disease Brother 46        MI     Diabetes Brother      Coronary Artery Disease Brother      Breast Cancer No family hx of      History   Drug Use No         Objective     BP (!) 150/88 (BP  "Location: Right arm, Patient Position: Sitting, Cuff Size: Adult Large)   Pulse 97   Temp 98.1  F (36.7  C) (Tympanic)   Resp 14   Ht 1.6 m (5' 3\")   Wt 83.5 kg (184 lb)   LMP 10/28/2019   SpO2 97%   BMI 32.59 kg/m      Physical Exam    GENERAL APPEARANCE: healthy, alert and no distress     EYES: EOMI, PERRL     HENT: ear canals and TM's normal and nose and mouth without ulcers or lesions     NECK: no adenopathy, no asymmetry, masses, or scars and thyroid normal to palpation     RESP: lungs clear to auscultation - no rales, rhonchi or wheezes     CV: regular rates and rhythm, normal S1 S2, no S3 or S4 and no murmur, click or rub     ABDOMEN:  soft, nontender, no HSM or masses and bowel sounds normal     MS: extremities normal- no gross deformities noted, no evidence of inflammation in joints, FROM in all extremities.     SKIN: no suspicious lesions or rashes     NEURO: Normal strength and tone, sensory exam grossly normal, mentation intact and speech normal     PSYCH: mentation appears normal. and affect normal/bright     LYMPHATICS: No cervical adenopathy    No results for input(s): HGB, PLT, INR, NA, POTASSIUM, CR, A1C in the last 72587 hours.     Diagnostics:  No labs were ordered during this visit.   No EKG required, no history of coronary heart disease, significant arrhythmia, peripheral arterial disease or other structural heart disease.    Revised Cardiac Risk Index (RCRI):  The patient has the following serious cardiovascular risks for perioperative complications:   - No serious cardiac risks = 0 points     RCRI Interpretation: 0 points: Class I (very low risk - 0.4% complication rate)           Signed Electronically by: THA Varma CNP  Copy of this evaluation report is provided to requesting physician.      "

## 2023-05-19 LAB
HCV AB SERPL QL IA: NONREACTIVE
HIV 1+2 AB+HIV1 P24 AG SERPL QL IA: NONREACTIVE

## 2023-05-23 ENCOUNTER — ANESTHESIA EVENT (OUTPATIENT)
Dept: SURGERY | Facility: CLINIC | Age: 58
End: 2023-05-23
Payer: COMMERCIAL

## 2023-05-23 NOTE — ANESTHESIA PREPROCEDURE EVALUATION
Anesthesia Pre-Procedure Evaluation    Patient: Sangita Montano   MRN: [id number] : 1965        Procedure : Procedure(s):  HYSTEROSCOPY, WITH DILATION AND CURETTAGE OF UTERUS          Past Medical History:   Diagnosis Date     Bladder cancer (H) 2013     Excessive or frequent menstruation 2006     Neoplasm of uncertain behavior of skin 2012      Past Surgical History:   Procedure Laterality Date     COLONOSCOPY N/A 3/2/2016    Procedure: COLONOSCOPY;  Surgeon: Bala Pineda MD;  Location: [facility]     CYSTOSCOPY, TRANSURETHRAL RESECTION (TUR) PROSTATE, COMBINED  7/3/2013    Procedure: COMBINED CYSTOSCOPY, TRANSURETHRAL RESECTION (TUR) PROSTATE;  Transurethral Resection of Bladder Cancer;  Surgeon: DONG Wright MD;  Location: [facility]     EYE SURGERY        Allergies   Allergen Reactions     Other Environmental Allergy Other (See Comments)     Airborne pollen: Runny nose, sneezing, and fatigue.  Processed meat (only tolerates fresh meat): Rash and migraine  Fermented foods: Rash and migraine  Vinegar: Rash and migraine     Penicillins Hives     Pollen Extract-Tree Extract [Pollen Extract] Other (See Comments)     Sneezing, fatigue, and runny nose     Sulfa Antibiotics Hives     Other Food Allergy Rash     migraine     Solanum Lycopersicum [Tomato] Rash     Migraine      Social History     Tobacco Use     Smoking status: Never     Smokeless tobacco: Never   Vaping Use     Vaping status: Never Used     Passive vaping exposure: Yes   Substance Use Topics     Alcohol use: No      Wt Readings from Last 1 Encounters:   23 83.5 kg (184 lb)        Anesthesia Evaluation   Pt has had prior anesthetic.     History of anesthetic complications  - PONV.      ROS/MED HX  ENT/Pulmonary:     (+) sleep apnea, uses CPAP,     Neurologic:  - neg neurologic ROS     Cardiovascular: Comment: White coat syndrome      (+) hypertension-----    METS/Exercise Tolerance:     Hematologic:  - neg hematologic   ROS     Musculoskeletal:  - neg musculoskeletal ROS     GI/Hepatic:  - neg GI/hepatic ROS     Renal/Genitourinary:  - neg Renal ROS     Endo:     (+) Obesity,     Psychiatric/Substance Use:  - neg psychiatric ROS     Infectious Disease:  - neg infectious disease ROS     Malignancy:       Other:            Physical Exam    Airway        Mallampati: II   TM distance: > 3 FB   Neck ROM: full   Mouth opening: > 3 cm    Respiratory Devices and Support         Dental    unable to assess        Cardiovascular   cardiovascular exam normal          Pulmonary   pulmonary exam normal                OUTSIDE LABS:  CBC:   Lab Results   Component Value Date    WBC 7.8 05/18/2023    WBC 6.0 12/23/2020    HGB 14.2 05/18/2023    HGB 13.7 12/23/2020    HCT 44.3 05/18/2023    HCT 42.6 12/23/2020     05/18/2023     12/23/2020     BMP:   Lab Results   Component Value Date     05/18/2023     12/23/2020    POTASSIUM 4.3 05/18/2023    POTASSIUM 3.9 12/23/2020    CHLORIDE 104 05/18/2023    CHLORIDE 102 12/23/2020    CO2 26 05/18/2023    CO2 29 12/23/2020    BUN 16.6 05/18/2023    BUN 16 12/23/2020    CR 0.79 05/18/2023    CR 0.86 12/23/2020     (H) 05/18/2023     (H) 12/23/2020     COAGS: No results found for: PTT, INR, FIBR  POC:   Lab Results   Component Value Date    HCG Negative 07/03/2013    HCGS Negative 03/02/2016     HEPATIC:   Lab Results   Component Value Date    ALBUMIN 4.5 05/18/2023    PROTTOTAL 7.3 05/18/2023    ALT 33 05/18/2023    AST 30 05/18/2023    ALKPHOS 65 05/18/2023    BILITOTAL 0.4 05/18/2023     OTHER:   Lab Results   Component Value Date    RICK 10.4 (H) 05/18/2023    LIPASE 158 12/23/2020    TSH 1.04 05/18/2023    T4 0.92 04/07/2022       Anesthesia Plan    ASA Status:  2      Anesthesia Type: General.   Induction: Intravenous.           Consents    Anesthesia Plan(s) and associated risks, benefits, and realistic alternatives discussed. Questions answered and  patient/representative(s) expressed understanding.     - Discussed: Risks, Benefits and Alternatives for the PROCEDURE were discussed     - Discussed with:  Patient         Postoperative Care    Pain management: IV analgesics, Oral pain medications.   PONV prophylaxis: Ondansetron (or other 5HT-3), Dexamethasone or Solumedrol     Comments:                THA Tejada CRNA

## 2023-05-24 ENCOUNTER — HOSPITAL ENCOUNTER (OUTPATIENT)
Facility: CLINIC | Age: 58
Discharge: HOME | End: 2023-05-24
Attending: OBSTETRICS & GYNECOLOGY | Admitting: OBSTETRICS & GYNECOLOGY
Payer: COMMERCIAL

## 2023-05-24 ENCOUNTER — ANESTHESIA (OUTPATIENT)
Dept: SURGERY | Facility: CLINIC | Age: 58
End: 2023-05-24
Payer: COMMERCIAL

## 2023-05-24 VITALS
DIASTOLIC BLOOD PRESSURE: 78 MMHG | HEART RATE: 77 BPM | WEIGHT: 184 LBS | TEMPERATURE: 97.4 F | SYSTOLIC BLOOD PRESSURE: 141 MMHG | RESPIRATION RATE: 12 BRPM | BODY MASS INDEX: 32.6 KG/M2 | HEIGHT: 63 IN | OXYGEN SATURATION: 98 %

## 2023-05-24 DIAGNOSIS — N95.0 POST-MENOPAUSAL BLEEDING: ICD-10-CM

## 2023-05-24 DIAGNOSIS — Z98.890 S/P D&C (STATUS POST DILATION AND CURETTAGE): Primary | ICD-10-CM

## 2023-05-24 PROCEDURE — 250N000009 HC RX 250: Performed by: NURSE ANESTHETIST, CERTIFIED REGISTERED

## 2023-05-24 PROCEDURE — 258N000001 HC RX 258: Performed by: OBSTETRICS & GYNECOLOGY

## 2023-05-24 PROCEDURE — 360N000076 HC SURGERY LEVEL 3, PER MIN: Performed by: OBSTETRICS & GYNECOLOGY

## 2023-05-24 PROCEDURE — 250N000013 HC RX MED GY IP 250 OP 250 PS 637

## 2023-05-24 PROCEDURE — 272N000001 HC OR GENERAL SUPPLY STERILE: Performed by: OBSTETRICS & GYNECOLOGY

## 2023-05-24 PROCEDURE — 88305 TISSUE EXAM BY PATHOLOGIST: CPT | Mod: TC | Performed by: OBSTETRICS & GYNECOLOGY

## 2023-05-24 PROCEDURE — 88305 TISSUE EXAM BY PATHOLOGIST: CPT | Mod: 26 | Performed by: STUDENT IN AN ORGANIZED HEALTH CARE EDUCATION/TRAINING PROGRAM

## 2023-05-24 PROCEDURE — 271N000001 HC OR GENERAL SUPPLY NON-STERILE: Performed by: OBSTETRICS & GYNECOLOGY

## 2023-05-24 PROCEDURE — 250N000009 HC RX 250

## 2023-05-24 PROCEDURE — 250N000011 HC RX IP 250 OP 636: Performed by: NURSE ANESTHETIST, CERTIFIED REGISTERED

## 2023-05-24 PROCEDURE — 710N000012 HC RECOVERY PHASE 2, PER MINUTE: Performed by: OBSTETRICS & GYNECOLOGY

## 2023-05-24 PROCEDURE — 370N000017 HC ANESTHESIA TECHNICAL FEE, PER MIN: Performed by: OBSTETRICS & GYNECOLOGY

## 2023-05-24 PROCEDURE — 58558 HYSTEROSCOPY BIOPSY: CPT | Performed by: OBSTETRICS & GYNECOLOGY

## 2023-05-24 PROCEDURE — 999N000141 HC STATISTIC PRE-PROCEDURE NURSING ASSESSMENT: Performed by: OBSTETRICS & GYNECOLOGY

## 2023-05-24 PROCEDURE — 258N000003 HC RX IP 258 OP 636

## 2023-05-24 PROCEDURE — 250N000011 HC RX IP 250 OP 636: Performed by: OBSTETRICS & GYNECOLOGY

## 2023-05-24 RX ORDER — LIDOCAINE 40 MG/G
CREAM TOPICAL
Status: DISCONTINUED | OUTPATIENT
Start: 2023-05-24 | End: 2023-05-24 | Stop reason: HOSPADM

## 2023-05-24 RX ORDER — GABAPENTIN 300 MG/1
300 CAPSULE ORAL
Status: COMPLETED | OUTPATIENT
Start: 2023-05-24 | End: 2023-05-24

## 2023-05-24 RX ORDER — ACETAMINOPHEN 325 MG/1
975 TABLET ORAL ONCE
Status: DISCONTINUED | OUTPATIENT
Start: 2023-05-24 | End: 2023-05-24

## 2023-05-24 RX ORDER — ACETAMINOPHEN 325 MG/1
975 TABLET ORAL ONCE
Status: DISCONTINUED | OUTPATIENT
Start: 2023-05-24 | End: 2023-05-24 | Stop reason: HOSPADM

## 2023-05-24 RX ORDER — NALOXONE HYDROCHLORIDE 0.4 MG/ML
0.4 INJECTION, SOLUTION INTRAMUSCULAR; INTRAVENOUS; SUBCUTANEOUS
Status: DISCONTINUED | OUTPATIENT
Start: 2023-05-24 | End: 2023-05-24 | Stop reason: HOSPADM

## 2023-05-24 RX ORDER — FENTANYL CITRATE 50 UG/ML
25 INJECTION, SOLUTION INTRAMUSCULAR; INTRAVENOUS EVERY 5 MIN PRN
Status: DISCONTINUED | OUTPATIENT
Start: 2023-05-24 | End: 2023-05-24 | Stop reason: HOSPADM

## 2023-05-24 RX ORDER — NALOXONE HYDROCHLORIDE 0.4 MG/ML
0.2 INJECTION, SOLUTION INTRAMUSCULAR; INTRAVENOUS; SUBCUTANEOUS
Status: DISCONTINUED | OUTPATIENT
Start: 2023-05-24 | End: 2023-05-24 | Stop reason: HOSPADM

## 2023-05-24 RX ORDER — HYDROMORPHONE HCL IN WATER/PF 6 MG/30 ML
0.4 PATIENT CONTROLLED ANALGESIA SYRINGE INTRAVENOUS EVERY 5 MIN PRN
Status: DISCONTINUED | OUTPATIENT
Start: 2023-05-24 | End: 2023-05-24 | Stop reason: HOSPADM

## 2023-05-24 RX ORDER — SODIUM CHLORIDE, SODIUM LACTATE, POTASSIUM CHLORIDE, CALCIUM CHLORIDE 600; 310; 30; 20 MG/100ML; MG/100ML; MG/100ML; MG/100ML
INJECTION, SOLUTION INTRAVENOUS CONTINUOUS
Status: DISCONTINUED | OUTPATIENT
Start: 2023-05-24 | End: 2023-05-24 | Stop reason: HOSPADM

## 2023-05-24 RX ORDER — FENTANYL CITRATE 50 UG/ML
INJECTION, SOLUTION INTRAMUSCULAR; INTRAVENOUS PRN
Status: DISCONTINUED | OUTPATIENT
Start: 2023-05-24 | End: 2023-05-24

## 2023-05-24 RX ORDER — ONDANSETRON 4 MG/1
4 TABLET, ORALLY DISINTEGRATING ORAL EVERY 30 MIN PRN
Status: DISCONTINUED | OUTPATIENT
Start: 2023-05-24 | End: 2023-05-24 | Stop reason: HOSPADM

## 2023-05-24 RX ORDER — KETAMINE HYDROCHLORIDE 10 MG/ML
INJECTION INTRAMUSCULAR; INTRAVENOUS PRN
Status: DISCONTINUED | OUTPATIENT
Start: 2023-05-24 | End: 2023-05-24

## 2023-05-24 RX ORDER — ONDANSETRON 4 MG/1
4 TABLET, ORALLY DISINTEGRATING ORAL EVERY 8 HOURS PRN
Qty: 4 TABLET | Refills: 0 | Status: SHIPPED | OUTPATIENT
Start: 2023-05-24 | End: 2023-12-01

## 2023-05-24 RX ORDER — ACETAMINOPHEN 325 MG/1
975 TABLET ORAL ONCE
Status: COMPLETED | OUTPATIENT
Start: 2023-05-24 | End: 2023-05-24

## 2023-05-24 RX ORDER — KETOROLAC TROMETHAMINE 30 MG/ML
30 INJECTION, SOLUTION INTRAMUSCULAR; INTRAVENOUS ONCE
Status: COMPLETED | OUTPATIENT
Start: 2023-05-24 | End: 2023-05-24

## 2023-05-24 RX ORDER — IBUPROFEN 400 MG/1
800 TABLET, FILM COATED ORAL ONCE
Status: DISCONTINUED | OUTPATIENT
Start: 2023-05-24 | End: 2023-05-24 | Stop reason: HOSPADM

## 2023-05-24 RX ORDER — DEXAMETHASONE SODIUM PHOSPHATE 4 MG/ML
INJECTION, SOLUTION INTRA-ARTICULAR; INTRALESIONAL; INTRAMUSCULAR; INTRAVENOUS; SOFT TISSUE PRN
Status: DISCONTINUED | OUTPATIENT
Start: 2023-05-24 | End: 2023-05-24

## 2023-05-24 RX ORDER — FENTANYL CITRATE 50 UG/ML
50 INJECTION, SOLUTION INTRAMUSCULAR; INTRAVENOUS EVERY 5 MIN PRN
Status: DISCONTINUED | OUTPATIENT
Start: 2023-05-24 | End: 2023-05-24 | Stop reason: HOSPADM

## 2023-05-24 RX ORDER — PROPOFOL 10 MG/ML
INJECTION, EMULSION INTRAVENOUS CONTINUOUS PRN
Status: DISCONTINUED | OUTPATIENT
Start: 2023-05-24 | End: 2023-05-24

## 2023-05-24 RX ORDER — KETOROLAC TROMETHAMINE 30 MG/ML
INJECTION, SOLUTION INTRAMUSCULAR; INTRAVENOUS PRN
Status: DISCONTINUED | OUTPATIENT
Start: 2023-05-24 | End: 2023-05-24

## 2023-05-24 RX ORDER — ONDANSETRON 2 MG/ML
INJECTION INTRAMUSCULAR; INTRAVENOUS PRN
Status: DISCONTINUED | OUTPATIENT
Start: 2023-05-24 | End: 2023-05-24

## 2023-05-24 RX ORDER — ONDANSETRON 2 MG/ML
4 INJECTION INTRAMUSCULAR; INTRAVENOUS EVERY 30 MIN PRN
Status: DISCONTINUED | OUTPATIENT
Start: 2023-05-24 | End: 2023-05-24 | Stop reason: HOSPADM

## 2023-05-24 RX ORDER — HYDROMORPHONE HCL IN WATER/PF 6 MG/30 ML
0.2 PATIENT CONTROLLED ANALGESIA SYRINGE INTRAVENOUS EVERY 5 MIN PRN
Status: DISCONTINUED | OUTPATIENT
Start: 2023-05-24 | End: 2023-05-24 | Stop reason: HOSPADM

## 2023-05-24 RX ORDER — IBUPROFEN 800 MG/1
800 TABLET, FILM COATED ORAL EVERY 6 HOURS PRN
Qty: 30 TABLET | Refills: 0 | Status: SHIPPED | OUTPATIENT
Start: 2023-05-24 | End: 2023-12-01

## 2023-05-24 RX ADMIN — MIDAZOLAM 2 MG: 1 INJECTION INTRAMUSCULAR; INTRAVENOUS at 07:32

## 2023-05-24 RX ADMIN — LIDOCAINE HYDROCHLORIDE 0.1 ML: 10 INJECTION, SOLUTION EPIDURAL; INFILTRATION; INTRACAUDAL; PERINEURAL at 07:03

## 2023-05-24 RX ADMIN — KETOROLAC TROMETHAMINE 30 MG: 30 INJECTION, SOLUTION INTRAMUSCULAR at 08:06

## 2023-05-24 RX ADMIN — GABAPENTIN 300 MG: 300 CAPSULE ORAL at 06:57

## 2023-05-24 RX ADMIN — KETAMINE HYDROCHLORIDE 10 MG: 10 INJECTION, SOLUTION INTRAMUSCULAR; INTRAVENOUS at 07:36

## 2023-05-24 RX ADMIN — KETAMINE HYDROCHLORIDE 10 MG: 10 INJECTION, SOLUTION INTRAMUSCULAR; INTRAVENOUS at 07:52

## 2023-05-24 RX ADMIN — SODIUM CHLORIDE, POTASSIUM CHLORIDE, SODIUM LACTATE AND CALCIUM CHLORIDE: 600; 310; 30; 20 INJECTION, SOLUTION INTRAVENOUS at 07:03

## 2023-05-24 RX ADMIN — FENTANYL CITRATE 100 MCG: 50 INJECTION, SOLUTION INTRAMUSCULAR; INTRAVENOUS at 07:32

## 2023-05-24 RX ADMIN — KETOROLAC TROMETHAMINE 30 MG: 30 INJECTION, SOLUTION INTRAMUSCULAR; INTRAVENOUS at 07:04

## 2023-05-24 RX ADMIN — ONDANSETRON 4 MG: 2 INJECTION INTRAMUSCULAR; INTRAVENOUS at 08:08

## 2023-05-24 RX ADMIN — ACETAMINOPHEN 975 MG: 325 TABLET, FILM COATED ORAL at 06:57

## 2023-05-24 RX ADMIN — PROPOFOL 100 MCG/KG/MIN: 10 INJECTION, EMULSION INTRAVENOUS at 07:34

## 2023-05-24 RX ADMIN — DEXAMETHASONE SODIUM PHOSPHATE 10 MG: 4 INJECTION, SOLUTION INTRA-ARTICULAR; INTRALESIONAL; INTRAMUSCULAR; INTRAVENOUS; SOFT TISSUE at 07:36

## 2023-05-24 ASSESSMENT — ACTIVITIES OF DAILY LIVING (ADL)
ADLS_ACUITY_SCORE: 35
ADLS_ACUITY_SCORE: 35

## 2023-05-24 NOTE — INTERVAL H&P NOTE
"I have reviewed the surgical (or preoperative) H&P that is linked to this encounter, and examined the patient. There are no significant changes    Clinical Conditions Present on Arrival:  Clinically Significant Risk Factors Present on Admission          # Hypercalcemia: Highest Ca = 10.4 mg/dL in last 30 days, will monitor as appropriate         # Obesity: Estimated body mass index is 32.59 kg/m  as calculated from the following:    Height as of this encounter: 1.6 m (5' 3\").    Weight as of this encounter: 83.5 kg (184 lb).       "

## 2023-05-24 NOTE — DISCHARGE INSTRUCTIONS
Same Day Surgery Discharge Instructions  Special Precautions After Surgery - Adult    It is not unusual to feel lightheaded or faint, up to 24 hours after surgery or while taking pain medication.  If you have these symptoms; sit for a few minutes before standing and have someone assist you when getting up.  You should rest and relax for the next 24 hours and must have someone stay with you for at least 24 hours after your discharge.  DO NOT DRIVE any vehicle or operate mechanical equipment for 24 hours following the end of your surgery.  DO NOT DRIVE while taking narcotic pain medications that have been prescribed by your physician.  If you had a limb operated on, you must be able to use it fully to drive.  DO NOT drink alcoholic beverages for 24 hours following surgery or while taking prescription pain medication.  Drink clear liquids (apple juice, ginger ale, broth, 7-Up, etc.).  Progress to your regular diet as you feel able.  Any questions call your physician and do not make important decisions for 24 hours.    Nausea and Vomiting: Nausea and vomiting can occur any time after receiving anesthesia. If you experience nausea and vomiting we encourage you to move to a clear liquid diet and advance your diet as tolerated. If nausea and vomiting do not improve within 12 hours please call the surgeon or present to the Emergency department.     Break-through Bleeding: If your experience bleeding from your surgical site apply pressure and additional dressing per nurse instruction. For simple problems such as a saturated dressing, you may need to reinforce the dressing with more gauze and tape and put slight pressure on the site. If bleeding does not subside contact the surgeon or present to the Emergency Department.    Post-op Infection: If you develop a fever of 100.4 or greater, have pus like drainage, redness, swelling or severe pain at the surgical site not alleviated with pain medications; please  contact the surgeon or present to the Emergency Department.   swe  Medications:  Acetaminophen (Tylenol):  Next dose: 1pm.  Follow the instructions on the bottle.  __________________________________________________________________________________________________________________________________  IMPORTANT NUMBERS:    [facility] Main Number:  [phone], [phone]  Pharmacy:  [phone]  Same Day Surgery:  [phone], for general post-op questions call Monday - Thursday until 8:30 p.m., Fridays until 6:00 p.m.  Nurse Advice Line:  [phone]                                                                         OB Clinic:  [phone]   Surgery Specialty Clinic:  [phone]

## 2023-05-24 NOTE — ANESTHESIA POSTPROCEDURE EVALUATION
Patient: Sangita Montano    Procedure: Procedure(s):  HYSTEROSCOPY, WITH DILATION AND CURETTAGE OF UTERUS       Anesthesia Type:  General    Note:  Disposition: Outpatient   Postop Pain Control: Uneventful            Sign Out: Well controlled pain   PONV: No   Neuro/Psych: Uneventful            Sign Out: Acceptable/Baseline neuro status   Airway/Respiratory: Uneventful            Sign Out: Acceptable/Baseline resp. status   CV/Hemodynamics: Uneventful            Sign Out: Acceptable CV status; No obvious hypovolemia; No obvious fluid overload   Other NRE: NONE   DID A NON-ROUTINE EVENT OCCUR? No           Last vitals:  Vitals Value Taken Time   /78 05/24/23 0900   Temp 36.3  C (97.4  F) 05/24/23 0812   Pulse 77 05/24/23 0900   Resp 12 05/24/23 0812   SpO2 96 % 05/24/23 0911   Vitals shown include unvalidated device data.    Electronically Signed By: THA Osuna CRNA  May 24, 2023  10:12 AM

## 2023-05-24 NOTE — OP NOTE
[facility] Operative Note    Patient Name: Sangita Montano  MRN:[id number]  : 1965  Date of Surgery: 23   Pre-operative diagnosis:  Endometrial polyp [N84.0]   Post-operative diagnosis:  S/p hysteroscopy with dilation and curettage   Procedure:  Procedure(s):  HYSTEROSCOPY, WITH DILATION AND CURETTAGE OF UTERUS   Surgeon:  Dr. Yu Strong (OB/GYN Attending Staff)    Assistant(s):  None   Anesthesia:  General   Estimated blood loss:  5cc   Total IV fluids:  650ml crystaloid    Blood transfusion:  No transfusion was given during surgery    Total urine output:  Bladder not drained in OR    Drains:  None   Specimens:  ID Type Source Tests Collected by Time Destination   1 : Endometrial Curettings Tissue Uterus SURGICAL PATHOLOGY EXAM Yu Strong,  2023  8:00 AM       Indication:  Patient is a 58 year old  with history of recurrent post menopausal bleeding. EMB showed possible endometrial polyp. Decision made to proceed to OR for hysteroscopy and D&C.    Prior to procedure risks benefits, complications, and alternatives were discussed with the patient.  Verbal and written consent were obtained.   Complications:  None apparent   Condition:  Stable, transferred to PACU        Findings: External Genitalia: Normal appearing female genitalia  Bimanual Examination: The uterus was noted to be anteverted and small, freely mobile.  No adnexal masses or fullness were appreciated. Narrow vagina  Hysteroscopy: Bilateral ostea visualized. Atrophic appearing endometrium without focal lesion.     Procedure:  Patient was met in the preoperative suite, where H&P was reviewed and consent obtained. Patient was taken to the operating room, where anesthesia was administered without difficulty.  Patient was placed in the dorsal lithotomy position, with special attention to positioning in order to avoid hyperflexion or hyperextension of the lower extremities.  Patient was then  prepped and draped in the standard sterile fashion.  A time out was performed to confirm correct patient and procedure.  Exam under anesthesia was then performed with the above noted findings. A weighted speculum was placed in the posterior aspect of the vagina. The cervix was grasped with the tenaculum. The uterus was then sounded to 6 cm.  The cervix was then serially dilated to accommodate the Myosure hysteroscope.  Calibration of the hysteroscope was performed in the usual fashion. The hysteroscope was then inserted into the cervix and advanced under direct visualization into the uterine cavity with the above findings as noted. Given the history of recurrent bleeding, decision was made to proceed with visual curettage.  The resecting blade was passed through the operative port of the hysteroscope until the tip of the blade was visible.  Curettage was performed. Incomplete sampling from the anterior wall due to uterine flexion and atrophy.  The operative instrument was removed from the hysteroscope and hysteroscope was then slowly removed from the uterus with assessment of the cervical canal. Sharp curettage was then performed across all surfaces of the endometrium to ensure adequate and comprehensive sampling.    Endometrial curettings were sent to pathology for assessment. The tenaculum was removed from the anterior lip of the cervix, with excellent hemostasis noted. The speculum was removed from the vagina. All sponge and needle counts were correct ×2.  At the conclusion of the procedure a debrief was performed.  Patient sent to recovery in stable condition.     [facility]

## 2023-05-24 NOTE — BRIEF OP NOTE
[facility]    Brief Operative Note    Pre-operative diagnosis: Endometrial polyp [N84.0]  Post-operative diagnosis S/p hysteroscopy with dilation and curettage    Procedure: Procedure(s):  HYSTEROSCOPY, WITH DILATION AND CURETTAGE OF UTERUS  Surgeon: Surgeon(s) and Role:     * Yu Strong DO - Primary  Anesthesia: General   Estimated Blood Loss: 5 mL from 5/24/2023  7:29 AM to 5/24/2023  8:10 AM      Drains: None  Specimens:   ID Type Source Tests Collected by Time Destination   1 : Endometrial Curettings Tissue Uterus SURGICAL PATHOLOGY EXAM Yu Strong DO 5/24/2023  8:00 AM      Findings:   atrophic appearing endometrium without focal lesion. bilateral ostea visualized..  Complications: None.  Implants: * No implants in log *      Yu Strong DO

## 2023-05-24 NOTE — ANESTHESIA CARE TRANSFER NOTE
Patient: Sangita Montano    Procedure: Procedure(s):  HYSTEROSCOPY, WITH DILATION AND CURETTAGE OF UTERUS       Diagnosis: Endometrial polyp [N84.0]  Diagnosis Additional Information: No value filed.    Anesthesia Type:   General     Note:    Oropharynx: oropharynx clear of all foreign objects  Level of Consciousness: awake  Oxygen Supplementation: face mask    Independent Airway: airway patency satisfactory and stable    Vital Signs Stable: post-procedure vital signs reviewed and stable  Report to RN Given: handoff report given  Patient transferred to: Phase II    Handoff Report: Identifed the Patient, Identified the Reponsible Provider, Reviewed the pertinent medical history, Discussed the surgical course, Reviewed Intra-OP anesthesia mangement and issues during anesthesia, Set expectations for post-procedure period and Allowed opportunity for questions and acknowledgement of understanding      Vitals:  Vitals Value Taken Time   /69 05/24/23 0811   Temp     Pulse 96 05/24/23 0811   Resp     SpO2 99 % 05/24/23 0815   Vitals shown include unvalidated device data.    Electronically Signed By: THA Osuna CRNA  May 24, 2023  8:15 AM

## 2023-06-02 ENCOUNTER — LAB (OUTPATIENT)
Dept: LAB | Facility: CLINIC | Age: 58
End: 2023-06-02
Payer: COMMERCIAL

## 2023-06-02 DIAGNOSIS — E55.9 VITAMIN D DEFICIENCY: ICD-10-CM

## 2023-06-02 DIAGNOSIS — E55.9 VITAMIN D DEFICIENCY: Primary | ICD-10-CM

## 2023-06-02 LAB
CALCIUM SERPL-MCNC: 9.5 MG/DL (ref 8.6–10)
PTH-INTACT SERPL-MCNC: 59 PG/ML (ref 15–65)

## 2023-06-02 PROCEDURE — 83970 ASSAY OF PARATHORMONE: CPT

## 2023-06-02 PROCEDURE — 82306 VITAMIN D 25 HYDROXY: CPT

## 2023-06-02 PROCEDURE — 82310 ASSAY OF CALCIUM: CPT

## 2023-06-02 PROCEDURE — 36415 COLL VENOUS BLD VENIPUNCTURE: CPT

## 2023-06-03 LAB — DEPRECATED CALCIDIOL+CALCIFEROL SERPL-MC: 55 UG/L (ref 20–75)

## 2023-06-05 ENCOUNTER — VIRTUAL VISIT (OUTPATIENT)
Dept: OBGYN | Facility: CLINIC | Age: 58
End: 2023-06-05
Payer: COMMERCIAL

## 2023-06-05 DIAGNOSIS — N85.02 ENDOMETRIAL HYPERPLASIA WITH ATYPIA: Primary | ICD-10-CM

## 2023-06-05 PROCEDURE — 99214 OFFICE O/P EST MOD 30 MIN: CPT | Mod: 95 | Performed by: OBSTETRICS & GYNECOLOGY

## 2023-06-05 RX ORDER — NORETHINDRONE ACETATE 5 MG
10 TABLET ORAL DAILY
Qty: 180 TABLET | Refills: 3 | Status: SHIPPED | OUTPATIENT
Start: 2023-06-05 | End: 2024-07-03

## 2023-06-05 NOTE — PROGRESS NOTES
Neyda is a 58 year old who is being evaluated via a billable telephone visit.      What phone number would you like to be contacted at? [phone]  How would you like to obtain your AVS? Shaunna  Distant Location (provider location):  On-site    Assessment & Plan   Problem List Items Addressed This Visit    None  Visit Diagnoses     Endometrial hyperplasia with atypia    -  Primary    Relevant Medications    norethindrone (AYGESTIN) 5 MG tablet         Patient doing well post operatively. Discussed results/findings of endometrial hyperplasia without atypia. Reviewed diagnosis and recommended treatment options, including medical and surgical options. Specifically reviewed PO progestin therapy, levonorgestrel IUD, and hysterectomy. Reviewed pros/cons of each option. She wants to think about her options, thinking hysterectomy would be her last option. Likely wants to try PO progestin therapy. Rx sent today. She will let me know if she changes her mind.    Reviewed recommended monitoring. Will need endometrial surveillance. Plan for EMB in 6 months (after D&C) after starting therapy. Then plan for q3-6 month monitoring.      Yu Strong DO  [facility] [place]    Subjective   Neyda is a 58 year old, presenting for the following health issues:  No chief complaint on file.        5/18/2023    10:00 AM   Additional Questions   Roomed by Latoya BOLTON CMA   Accompanied by self     HPI   Patient doing well post operatively. Didn't have much pain. Her bleeding has subsided. No concerns.        Objective           Vitals:  No vitals were obtained today due to virtual visit.    Physical Exam   healthy, alert and no distress  PSYCH: Alert and oriented times 3; coherent speech, normal   rate and volume, able to articulate logical thoughts, Her affect is normal  RESP: No cough, no audible wheezing, able to talk in full sentences  Remainder of exam unable to be completed due to telephone visits      Reviewed  pathology results from endometrial curettings 5/24/23: endometrial hyperplasia without atypia within polyp      Phone call duration: 29 minutes  39 minutes spent by me on the date of the encounter doing chart review, review of test results, interpretation of tests, patient visit and documentation.

## 2023-07-03 ENCOUNTER — TELEPHONE (OUTPATIENT)
Dept: SLEEP MEDICINE | Facility: CLINIC | Age: 58
End: 2023-07-03
Payer: COMMERCIAL

## 2023-07-03 NOTE — TELEPHONE ENCOUNTER
FYI - Status Update    Who is Calling: patient    Update: Pt has a dental referral for NELLIE on file. Pt is requesting that fax be sent to dental facility  [facility]  -  [phone]  Ravinder Arias     Does caller want a call/response back: Yes     Could we send this information to you in [employer] or would you prefer to receive a phone call?:   Patient would like to be contacted via [employer]

## 2023-08-25 ENCOUNTER — MYC MEDICAL ADVICE (OUTPATIENT)
Dept: SLEEP MEDICINE | Facility: CLINIC | Age: 58
End: 2023-08-25
Payer: COMMERCIAL

## 2023-09-05 ENCOUNTER — HOSPITAL ENCOUNTER (OUTPATIENT)
Dept: BONE DENSITY | Facility: CLINIC | Age: 58
Discharge: HOME | End: 2023-09-05
Attending: OBSTETRICS & GYNECOLOGY | Admitting: OBSTETRICS & GYNECOLOGY
Payer: COMMERCIAL

## 2023-09-05 DIAGNOSIS — M85.859 OSTEOPENIA OF NECK OF FEMUR, UNSPECIFIED LATERALITY: ICD-10-CM

## 2023-09-05 PROCEDURE — 77080 DXA BONE DENSITY AXIAL: CPT

## 2023-09-21 ENCOUNTER — HOSPITAL ENCOUNTER (OUTPATIENT)
Dept: MAMMOGRAPHY | Facility: CLINIC | Age: 58
Discharge: HOME | End: 2023-09-21
Attending: OBSTETRICS & GYNECOLOGY | Admitting: OBSTETRICS & GYNECOLOGY
Payer: COMMERCIAL

## 2023-09-21 ENCOUNTER — VIRTUAL VISIT (OUTPATIENT)
Dept: OBGYN | Facility: CLINIC | Age: 58
End: 2023-09-21
Payer: COMMERCIAL

## 2023-09-21 VITALS — BODY MASS INDEX: 29.77 KG/M2 | WEIGHT: 168 LBS | HEIGHT: 63 IN

## 2023-09-21 DIAGNOSIS — N85.02 ENDOMETRIAL HYPERPLASIA WITH ATYPIA: Primary | ICD-10-CM

## 2023-09-21 DIAGNOSIS — Z12.31 VISIT FOR SCREENING MAMMOGRAM: ICD-10-CM

## 2023-09-21 DIAGNOSIS — T88.7XXA MEDICATION SIDE EFFECTS: ICD-10-CM

## 2023-09-21 PROCEDURE — 99213 OFFICE O/P EST LOW 20 MIN: CPT | Mod: 95 | Performed by: OBSTETRICS & GYNECOLOGY

## 2023-09-21 PROCEDURE — 77067 SCR MAMMO BI INCL CAD: CPT

## 2023-09-21 NOTE — PROGRESS NOTES
Neyda is a 58 year old who is being evaluated via a billable telephone visit.      What phone number would you like to be contacted at? [phone]    How would you like to obtain your AVS? Shaunna    Distant Location (provider location):  On-site    Phone call duration: 12 minutes   Start time: 10:20  End time: 10:32    Assessment & Plan   Problem List Items Addressed This Visit    None  Visit Diagnoses       Endometrial hyperplasia with atypia    -  Primary    Medication side effects                 Patient is being treated for endometrial hyperplasia without atypia with Aygestin.  She is having medication side effects.  Primary concerns are fatigue and increase in migraine headaches.  Discussed transitioning to taking medications just in the evening to help with the fatigue aspect.  Also discussed alternative management options.  She has declined Levonorgestrel IUD.  Offered again today which she declines again.  Also discussed other medication and surgical options.  She will start taking the medications just in the evening and monitor symptoms.  Has follow-up scheduled for repeat biopsy in December. Would consider Prometrium at this time if not tolerating higher potency progestin therapy. Discussed recommendation to continue therapy for 24 months after negative biopsy.     Yu Strong DO  [facility] [place]    Subjective   Neyda is a 58 year old, presenting for the following health issues:  Follow Up    HPI     Patient was started on Aygestin for treatment of endometrial hyperplasia without atypia.  She has been having medication side effects since starting.  Having a lot of fatigue with the medication. Was prescribed twice a day and only able to tolerate 1-1.5 pills per day. Also having migraines, getting every 2-3 weeks. Have been severe. No gynecologic concerns, denies any vaginal bleeding.          Objective           Vitals:  No vitals were obtained today due to virtual  visit.    Physical Exam   healthy, alert, and no distress  PSYCH: Alert and oriented times 3; coherent speech, normal   rate and volume, able to articulate logical thoughts, Her affect is normal  RESP: No cough, no audible wheezing, able to talk in full sentences  Remainder of exam unable to be completed due to telephone visits

## 2023-09-22 ENCOUNTER — OFFICE VISIT (OUTPATIENT)
Dept: FAMILY MEDICINE | Facility: CLINIC | Age: 58
End: 2023-09-22
Payer: COMMERCIAL

## 2023-09-22 VITALS
TEMPERATURE: 98.9 F | DIASTOLIC BLOOD PRESSURE: 78 MMHG | SYSTOLIC BLOOD PRESSURE: 134 MMHG | BODY MASS INDEX: 31.75 KG/M2 | HEART RATE: 93 BPM | HEIGHT: 63 IN | OXYGEN SATURATION: 99 % | RESPIRATION RATE: 16 BRPM | WEIGHT: 179.2 LBS

## 2023-09-22 DIAGNOSIS — G43.109 MIGRAINE WITH AURA AND WITHOUT STATUS MIGRAINOSUS, NOT INTRACTABLE: Primary | ICD-10-CM

## 2023-09-22 PROCEDURE — 90471 IMMUNIZATION ADMIN: CPT | Performed by: NURSE PRACTITIONER

## 2023-09-22 PROCEDURE — 90682 RIV4 VACC RECOMBINANT DNA IM: CPT | Performed by: NURSE PRACTITIONER

## 2023-09-22 PROCEDURE — 99214 OFFICE O/P EST MOD 30 MIN: CPT | Mod: 25 | Performed by: NURSE PRACTITIONER

## 2023-09-22 RX ORDER — RIZATRIPTAN BENZOATE 5 MG/1
5 TABLET ORAL
Qty: 15 TABLET | Refills: 1 | Status: SHIPPED | OUTPATIENT
Start: 2023-09-22

## 2023-09-22 ASSESSMENT — PAIN SCALES - GENERAL: PAINLEVEL: NO PAIN (0)

## 2023-09-22 NOTE — PROGRESS NOTES
"  Assessment & Plan     1. Migraine with aura and without status migrainosus, not intractable  Plan to start patient on rizatriptan abortive therapy. Advised patient to take at earliest onset of migraine and continue supportive treatment, with option to repeat dose within two hours if medication is unsuccessful.    Advised patient to follow up with provider after attempting use of medication, with plan to try alternative therapy if triptan is unsuccessful, or potentially preventative therapy if headaches worsen in frequency or symptoms.    - rizatriptan (MAXALT) 5 MG tablet; Take 1 tablet (5 mg) by mouth at onset of headache for migraine May repeat in 2 hours. Max 6 tablets/24 hours.  Dispense: 15 tablet; Refill: 1         BMI:   Estimated body mass index is 31.74 kg/m  as calculated from the following:    Height as of this encounter: 1.6 m (5' 3\").    Weight as of this encounter: 81.3 kg (179 lb 3.2 oz).   Weight management plan: Discussed healthy diet and exercise guidelines    MEDICATIONS:        - Trial of Rizatriptan    THA Varma [facility]    Jeni Faye is a pleasant 58 year old, presenting for the following health issues:  Migraine            9/22/2023     9:01 AM   Additional Questions   Roomed by Stacy ROCA MA   Accompanied by Self       History of Present Illness       Headaches:   Since the patient's last clinic visit, headaches are: worsened  The patient is getting headaches:  Reaction to histamine intolerance  She is not able to do normal daily activities when she has a migraine.  The patient is taking the following rescue/relief medications:  Other   Patient states \"I get some relief\" from the rescue/relief medications.   The patient is taking the following medications to prevent migraines:  No medications to prevent migraines  In the past 4 weeks, the patient has gone to an Urgent Care or Emergency Room 0 times times due to headaches.    She eats 4 or " "more servings of fruits and vegetables daily.She consumes 0 sweetened beverage(s) daily.She exercises with enough effort to increase her heart rate 30 to 60 minutes per day.  She exercises with enough effort to increase her heart rate 4 days per week.   She is taking medications regularly.       *Had a bad one about 3 weeks ago-took about 2 hours to montrell after she took an 800mg Ibuprofen.  Patient has a histamine intolerance-so anytime she has a reaction, she will get a migraine.    Neyda presents with c/o intermittent headache, triggered by histamine exposure, especially in fermented foods; fruits, vegetables, and meat are often okay. Headaches are typically unilateral, starting at her neck and radiating to jaw, sometimes with aura. Patient typically becomes photophobic, nauseated without emesis, and has to lie down to sleep while waiting for them to resolve. Last headache was 3 weeks ago, when patient took 800mg ibuprofen and headache successfully resolved within 2 hours. States that she typically gets one headache about every 1-2 months. Has previously used magnesium and ice packs in the past as well, but would prefer alternative to ibuprofen, as it \"tears [her] stomach up.\"     Denies vertigo, numbness or tingling, waking up from sleep in the middle of the night with headache, fevers, chills, recent infection. Denies smoking history.    Has previous history of hypertension. Since last visit, patient has reportedly increased her activity, and now walks for about an hour 3-4 times per week. She feels she's gotten stronger as a result. Reports intermittent dermatitis and itching.     In May, patient completed a D&C, and has been prescribed norethindrone 2x/daily since then, but states that she's often unable to take medication due to resulting fatigue. Plan for biopsy in December, states that she's generally feeling much better since the procedure.     Patient declines shingles vaccine at this time, just received " "flu shot and completed mammogram.      Review of Systems   CONSTITUTIONAL:NEGATIVE for fever, chills, change in weight and NEGATIVE  for anorexia, arthralgias, chills, fatigue, fever, malaise, myalgias, sweats, weight gain, and weight loss      Objective    /78   Pulse 93   Temp 98.9  F (37.2  C) (Tympanic)   Resp 16   Ht 1.6 m (5' 3\")   Wt 81.3 kg (179 lb 3.2 oz)   LMP 10/28/2019   SpO2 99%   BMI 31.74 kg/m    Body mass index is 31.74 kg/m .  Physical Exam   GENERAL: healthy, alert and no distress  NECK: no adenopathy, no asymmetry, masses, or scars and thyroid normal to palpation; dermatitis noted to right neck.   RESP: lungs clear to auscultation - no rales, rhonchi or wheezes  CV: regular rate and rhythm, normal S1 S2, no S3 or S4, no murmur, click or rub, no peripheral edema and peripheral pulses strong  MS: no gross musculoskeletal defects noted, no edema  NEURO: Patient neurologically intact with no deficits, alert and oriented and appropriate with provider. Gait regular and even.         Physician Attestation   I, THA Varma CNP, was present with the medical/FAVIOLA student who participated in the service and in the documentation of the note.  I have verified the history and personally performed the physical exam and medical decision making.  I agree with the assessment and plan of care as documented in the note.      Items personally reviewed: labs, HPI, exam, History. I reviewed entire note and checked in with patient. .    THA Varma CNP              "

## 2023-10-06 ENCOUNTER — IMMUNIZATION (OUTPATIENT)
Dept: FAMILY MEDICINE | Facility: CLINIC | Age: 58
End: 2023-10-06
Payer: COMMERCIAL

## 2023-10-06 PROCEDURE — 90480 ADMN SARSCOV2 VAC 1/ONLY CMP: CPT

## 2023-10-06 PROCEDURE — 91320 SARSCV2 VAC 30MCG TRS-SUC IM: CPT

## 2023-11-29 ENCOUNTER — MYC MEDICAL ADVICE (OUTPATIENT)
Dept: SLEEP MEDICINE | Facility: CLINIC | Age: 58
End: 2023-11-29
Payer: COMMERCIAL

## 2023-11-29 DIAGNOSIS — G47.33 OSA (OBSTRUCTIVE SLEEP APNEA): Primary | Chronic | ICD-10-CM

## 2023-11-29 DIAGNOSIS — E66.09 CLASS 1 OBESITY DUE TO EXCESS CALORIES WITH SERIOUS COMORBIDITY AND BODY MASS INDEX (BMI) OF 31.0 TO 31.9 IN ADULT: Chronic | ICD-10-CM

## 2023-11-29 DIAGNOSIS — F51.04 CHRONIC INSOMNIA: ICD-10-CM

## 2023-11-29 DIAGNOSIS — E66.811 CLASS 1 OBESITY DUE TO EXCESS CALORIES WITH SERIOUS COMORBIDITY AND BODY MASS INDEX (BMI) OF 31.0 TO 31.9 IN ADULT: Chronic | ICD-10-CM

## 2023-11-30 NOTE — TELEPHONE ENCOUNTER
Jann Faye,    I just faxed your sleep study and your last visit notes with sleep medicine to Dr. Byron DDS. At [phone]. Please contact the clinic if you have any other questions or concerns. Thanks!    [facility]

## 2023-12-01 ENCOUNTER — OFFICE VISIT (OUTPATIENT)
Dept: OBGYN | Facility: CLINIC | Age: 58
End: 2023-12-01
Payer: COMMERCIAL

## 2023-12-01 VITALS
HEIGHT: 63 IN | HEART RATE: 103 BPM | TEMPERATURE: 98.8 F | BODY MASS INDEX: 31.08 KG/M2 | DIASTOLIC BLOOD PRESSURE: 83 MMHG | SYSTOLIC BLOOD PRESSURE: 149 MMHG | RESPIRATION RATE: 14 BRPM | WEIGHT: 175.4 LBS

## 2023-12-01 DIAGNOSIS — N85.00 ENDOMETRIAL HYPERPLASIA WITHOUT ATYPIA: Primary | ICD-10-CM

## 2023-12-01 PROCEDURE — 88305 TISSUE EXAM BY PATHOLOGIST: CPT | Performed by: PATHOLOGY

## 2023-12-01 PROCEDURE — 99213 OFFICE O/P EST LOW 20 MIN: CPT | Mod: 25 | Performed by: OBSTETRICS & GYNECOLOGY

## 2023-12-01 PROCEDURE — 58100 BIOPSY OF UTERUS LINING: CPT | Performed by: OBSTETRICS & GYNECOLOGY

## 2023-12-01 RX ORDER — PROGESTERONE 200 MG/1
200 CAPSULE ORAL DAILY
Qty: 90 CAPSULE | Refills: 3 | Status: SHIPPED | OUTPATIENT
Start: 2023-12-01 | End: 2024-07-03

## 2023-12-01 NOTE — NURSING NOTE
"Initial BP (!) 149/83 (BP Location: Left arm, Patient Position: Sitting, Cuff Size: Adult Regular)   Pulse 103   Temp 98.8  F (37.1  C) (Tympanic)   Resp 14   Ht 1.6 m (5' 3\")   Wt 79.6 kg (175 lb 6.4 oz)   LMP 10/28/2019   BMI 31.07 kg/m   Estimated body mass index is 31.07 kg/m  as calculated from the following:    Height as of this encounter: 1.6 m (5' 3\").    Weight as of this encounter: 79.6 kg (175 lb 6.4 oz). .    "

## 2023-12-01 NOTE — PROGRESS NOTES
[facility]    Gynecology Office Note    CC:   Chief Complaint   Patient presents with    Follow Up        HPI: Sangita Montano is a 58 year old  who presents for follow up for endometrial hyperplasia without atypia.  She has no new concerns today.  She continues to have side effects from her Aygestin.  She is having a significant amount of fatigue.  She has altered the timing that she has been taking the medication without any improvement in symptoms.  She declines any vaginal bleeding or other concerns.    GYN Hx:     Patient is menopausal: yes    Patient's last menstrual period was 10/28/2019.    Endometrial hyperplasia without atypia  Last Pap Smear: 2023 NIL, HPV negative        ROS: A 10 pt ROS was completed and found to be otherwise negative unless mentioned in the HPI.     PMH:   Past Medical History:   Diagnosis Date    Bladder cancer (H) 2013    Excessive or frequent menstruation 2006    Neoplasm of uncertain behavior of skin 2012       PSHx:   Past Surgical History:   Procedure Laterality Date    COLONOSCOPY N/A 3/2/2016    Procedure: COLONOSCOPY;  Surgeon: Bala Pineda MD;  Location: [facility]    CYSTOSCOPY, TRANSURETHRAL RESECTION (TUR) PROSTATE, COMBINED  7/3/2013    Procedure: COMBINED CYSTOSCOPY, TRANSURETHRAL RESECTION (TUR) PROSTATE;  Transurethral Resection of Bladder Cancer;  Surgeon: DONG Wright MD;  Location: [facility]    DILATION AND CURETTAGE, OPERATIVE HYSTEROSCOPY, COMBINED N/A 2023    Procedure: HYSTEROSCOPY, WITH DILATION AND CURETTAGE OF UTERUS;  Surgeon: Yu Strong DO;  Location: [facility]    EYE SURGERY         OBHx:   OB History    Para Term  AB Living   0 0 0 0 0 0   SAB IAB Ectopic Multiple Live Births   0 0 0 0 0       Medications:   ascorbic acid (VITAMIN C) 500 MG tablet, Take by mouth daily 6293-2295 mg daily  Cholecalciferol (VITAMIN D-3) 5000 UNITS TABS,   Dexchlorphen-PSE-Methscop (D-HIST D  PO),   fish oil-omega-3 fatty acids 1000 MG capsule, Take 1,000 mg by mouth daily 3 tablets daily  MAGNESIUM GLYCINATE, 100 mg qd  multivitamin, therapeutic (THERA-VIT) TABS tablet, Take 1 tablet by mouth daily  NALTREXONE HCL PO, Take 4.5 mg by mouth daily  norethindrone (AYGESTIN) 5 MG tablet, Take 2 tablets (10 mg) by mouth daily  rizatriptan (MAXALT) 5 MG tablet, Take 1 tablet (5 mg) by mouth at onset of headache for migraine May repeat in 2 hours. Max 6 tablets/24 hours.  UNABLE TO FIND, MEDICATION NAME: Biotic 7  Zinc 10 MG LOZG,     No current facility-administered medications on file prior to visit.      Allergies:      Allergies   Allergen Reactions    Other Environmental Allergy Other (See Comments)     Airborne pollen: Runny nose, sneezing, and fatigue.  Processed meat (only tolerates fresh meat): Rash and migraine  Fermented foods: Rash and migraine  Vinegar: Rash and migraine    Penicillins Hives    Pollen Extract-Tree Extract [Pollen Extract] Other (See Comments)     Sneezing, fatigue, and runny nose    Sulfa Antibiotics Hives    Avocado Rash     Migraine    Crab Extract Allergy Skin Test Rash    Gluten Meal Rash     Migraine    Milk (Cow) Rash     Migraine    Other Food Allergy Rash     migraine    Solanum Lycopersicum [Tomato] Rash     Migraine       Social History:   Social History     Socioeconomic History    Marital status: Single     Spouse name: Not on file    Number of children: Not on file    Years of education: Not on file    Highest education level: Not on file   Occupational History    Occupation: Investigator     Employer: [employer]   Tobacco Use    Smoking status: Never    Smokeless tobacco: Never   Vaping Use    Vaping Use: Never used   Substance and Sexual Activity    Alcohol use: No    Drug use: No    Sexual activity: Never   Other Topics Concern    Parent/sibling w/ CABG, MI or angioplasty before 65F 55M? Yes     Comment: Brother heart attack   Social History Narrative    Not on file      Social Determinants of Health     Financial Resource Strain: Low Risk  (9/22/2023)    Financial Resource Strain     Within the past 12 months, have you or your family members you live with been unable to get utilities (heat, electricity) when it was really needed?: No   Food Insecurity: Low Risk  (9/22/2023)    Food Insecurity     Within the past 12 months, did you worry that your food would run out before you got money to buy more?: No     Within the past 12 months, did the food you bought just not last and you didn t have money to get more?: No   Transportation Needs: Low Risk  (9/22/2023)    Transportation Needs     Within the past 12 months, has lack of transportation kept you from medical appointments, getting your medicines, non-medical meetings or appointments, work, or from getting things that you need?: No   Physical Activity: Not on file   Stress: Not on file   Social Connections: Not on file   Interpersonal Safety: Low Risk  (9/22/2023)    Interpersonal Safety     Do you feel physically and emotionally safe where you currently live?: Yes     Within the past 12 months, have you been hit, slapped, kicked or otherwise physically hurt by someone?: No     Within the past 12 months, have you been humiliated or emotionally abused in other ways by your partner or ex-partner?: No   Housing Stability: Low Risk  (9/22/2023)    Housing Stability     Do you have housing? : Yes     Are you worried about losing your housing?: No         Family History:   Family History   Problem Relation Age of Onset    Cerebrovascular Disease Mother     Hypertension Father     Cancer Father         bladder cancer,  liver cancer    Heart Disease Father     Other Cancer Father         Bladder Cancer    Heart Disease Brother 46        MI    Diabetes Brother     Coronary Artery Disease Brother     Breast Cancer No family hx of        Physical Exam:   Vitals:    12/01/23 0855   BP: (!) 149/83   BP Location: Left arm   Patient Position:  "Sitting   Cuff Size: Adult Regular   Pulse: 103   Resp: 14   Temp: 98.8  F (37.1  C)   TempSrc: Tympanic   Weight: 79.6 kg (175 lb 6.4 oz)   Height: 1.6 m (5' 3\")      Estimated body mass index is 31.07 kg/m  as calculated from the following:    Height as of this encounter: 1.6 m (5' 3\").    Weight as of this encounter: 79.6 kg (175 lb 6.4 oz).    General appearance: well-hydrated, A&O x 3, no apparent distress  Lungs: Equal expansion bilaterally, no accessory muscle use  Heart: No heaves or thrills.   Constitutional: See vitals  Extremities: no edema  Neuro: CN II-XII grossly intact  Genitourinary:  External genitalia: no erythema, no lesions.   Urethral meatus appropriate location without lesions or prolapse  Anus and Perineum: Unremarkable, no visible lesions  Vagina: Normal, healthy pink mucosa without any lesions. Physiologic vaginal discharge.   Cervix: normal appearance, no cervical motion tenderness.       Endometrial Biopsy Procedure Note      Sangita Montano  1965  [id number]    Indications:    Sangita Montano is a 58 year old year old female, who is having an endometrial biopsy for  follow up on endometrial hyperplasia wit hout atypia.    Procedure:  Is a pregnancy test required: No.    Prior to the start of the procedure, written and verbal consent was obtained from the patient. The patient was then placed in the dorsal lithotomy position.  A speculum was placed in the vagina and the cervix visualized. The cervix was cleaned with betadine swabs x3. A tenaculum was placed on the cervix. A small plastic 5 mm Pipelle syringe curette was passed through the cervix to the fundus with return of scant amount of tissue. This was placed in specimen jar and sent for permanent pathology. All instruments were removed.      The patient tolerated the procedure fairly well.    Post Procedure:    PLAN : Await the results of the biopsy. There were no complications. Patient was discharged in stable " condition.    The patient was given post op instructions which included activity and pelvic restrictions.  She was advised to call the clinic if excessive bleeding, pelvic pain, or fever.             Assessment and Plan:     Encounter Diagnosis   Name Primary?    Endometrial hyperplasia without atypia Yes     Plan follow-up based on results of EMB today:  - If progression of hyperplasia, plan gynecologic oncology referral.  - If continued endometrial hyperplasia without atypia, continue progesterone therapy. (She desires transition to Prometrium due to current side effects. Aware this is a lower potency progestin.) Plan repeat biopsy in 3 to 6 months.  - If resolution of hyperplasia, option to continue progestin therapy (recommended) or discontinuation with close monitoring due to side effects. Plan repeat biopsy in 6 to 12 months.  - Recommended plan to continue progestin therapy for 12 to 24 months with continued monitoring with endometrial sampling after resolution of hyperplasia.    Health maintenance: pap smear up to date    Follow up based on pathology results.    Yu Strong,

## 2024-01-03 ENCOUNTER — OFFICE VISIT (OUTPATIENT)
Dept: PODIATRY | Facility: CLINIC | Age: 59
End: 2024-01-03
Payer: COMMERCIAL

## 2024-01-03 VITALS
DIASTOLIC BLOOD PRESSURE: 83 MMHG | HEART RATE: 92 BPM | HEIGHT: 63 IN | SYSTOLIC BLOOD PRESSURE: 162 MMHG | WEIGHT: 175 LBS | BODY MASS INDEX: 31.01 KG/M2

## 2024-01-03 DIAGNOSIS — B35.1 PAIN DUE TO ONYCHOMYCOSIS OF TOENAILS OF BOTH FEET: ICD-10-CM

## 2024-01-03 DIAGNOSIS — L60.0 INGROWN NAIL OF GREAT TOE OF RIGHT FOOT: Primary | ICD-10-CM

## 2024-01-03 DIAGNOSIS — M79.674 PAIN DUE TO ONYCHOMYCOSIS OF TOENAILS OF BOTH FEET: ICD-10-CM

## 2024-01-03 DIAGNOSIS — M79.675 PAIN DUE TO ONYCHOMYCOSIS OF TOENAILS OF BOTH FEET: ICD-10-CM

## 2024-01-03 PROCEDURE — 11750 EXCISION NAIL&NAIL MATRIX: CPT | Mod: T5 | Performed by: PODIATRIST

## 2024-01-03 PROCEDURE — 99203 OFFICE O/P NEW LOW 30 MIN: CPT | Mod: 25 | Performed by: PODIATRIST

## 2024-01-03 RX ORDER — CICLOPIROX 80 MG/ML
SOLUTION TOPICAL DAILY
Qty: 6.6 ML | Refills: 1 | Status: SHIPPED | OUTPATIENT
Start: 2024-01-03

## 2024-01-03 NOTE — PROGRESS NOTES
"Sangita Montano is a 58 year old female who presents with a chief complaint of a painful ingrown toenail to the right great toe.  The patient relates pain when wearing shoes.  The patient denies any redness extending up the big toe into the foot.  The patient relates the condition has been getting worse over the past several weeks.         Pertinent medical, surgical and family history was reviewed in the chart.    Vitals: BP (!) 162/83   Pulse 92   Ht 1.6 m (5' 3\")   Wt 79.4 kg (175 lb)   LMP 10/28/2019   BMI 31.00 kg/m    BMI= Body mass index is 31 kg/m .    LOWER EXTREMITY PHYSICAL EXAM    Dermatologic: One notes an inflamed nail border of the right great toe.  There is noted erythema and edema located around the labial fold and does not extend past the interphalangeal joint.  There is no apparent purulent drainage noted.  There is pain on palpation to the proximal aspect of the nail border.  Otherwise, the skin is intact to both lower extremities without significant lesions, rash or abrasion.           Vascular: DP & PT pulses are intact & regular on the right.   CFT and skin temperature is normal to the right lower extremities.     Neurologic: Lower extremity sensation is intact to light touch.  No evidence of weakness in the right lower extremities.        Musculoskeletal: Patient is ambulatory without assistive device or brace.  No gross ankle deformity noted.  No foot or ankle joint effusion is noted.         ASSESSMENT / PLAN:     ICD-10-CM    1. Ingrown nail of great toe of right foot  L60.0 REMOVAL NAIL/NAIL BED, PARTIAL OR COMPLETE      2. Pain due to onychomycosis of toenails of both feet  B35.1 ciclopirox (PENLAC) 8 % external solution    M79.675     M79.674           Plan:  I have explained to Sangita about the condition.  The potential causes and nature of an ingrown toenail were discussed with the patient.  We reviewed the natural history and prognosis of the condition and potential risks " if no treatment is provided.  Treatment options discussed included conservative management (oral antibiotics, soaking of foot, adequate width shoes)  as well as surgical management (partial or total nail removal).  The pros and cons of both forms as well as risks and benefits of treatment were reviewed.  The patient was prescribed Penlac 8% topical antifungal solution to be applied to the right great toenail daily for up to 6-9 months.     At this point, I recommended having the offending nail border permanently removed from the right great toe.  I have explained to the patient all of the possible risks, benefits, alternatives to the procedure.  The patient consented to the proposed procedure.  The right hallux was swabbed with alcohol.  Next, approximately 3 cc of 1% lidocaine plain was injected around the right hallux.  The right hallux was then prepped with Betadine ointment.  A tourniquet was applied to the right hallux.  A [place] elevator was utilized to free up the eponychium of the offending nail border.  Next, the offending nail border was split using an English anvil back to the matrix.  Next, utilizing a straight hemostat, the offending nail border was avulsed in toto.  The wound bed was debrided on any remaining nail and hyperkeratotic skin.  Next, the offending nail matrix was treated with 89% phenol using microtip cotton applicators for 30 seconds.  The wound was then irrigated and dressed with bacitracin antibiotic ointment and a compressive  sterile dressing.  The tourniquet was removed and a prompt hyperemic response was noted.  The patient tolerated the procedure well with no complications.  The patient was given post procedure instructions for the care of the wound.  The patient was informed that it is common to experience redness with watery drainage coming from the treated areas of the toe related to the phenol application.  The patient will return in two weeks for reevaluation and was instructed  to notify the office if any redness extending past the big toe joint or fever with chills are experienced before then.      There is low risk of morbidity with the procedure.  There was no overlap in work associated with the evaluation/management and the work associated with the procedure.    Sangita verbalized agreement with and understanding of the rational for the diagnosis and treatment plan.  All questions were answered to best of my ability and the patient's satisfaction. The patient was advised to contact the clinic with any questions that may arise after the clinic visit.      Disclaimer: This note consists of symbols derived from keyboarding, dictation and/or voice recognition software. As a result, there may be errors in the script that have gone undetected. Please consider this when interpreting information found in this chart.      THAI Rogers D.P.M., F.A.C.F.A.S.

## 2024-01-03 NOTE — NURSING NOTE
"Chief Complaint   Patient presents with    Ingrown Toenail     Great right toe       Initial BP (!) 162/83   Pulse 92   Ht 1.6 m (5' 3\")   Wt 79.4 kg (175 lb)   LMP 10/28/2019   BMI 31.00 kg/m   Estimated body mass index is 31 kg/m  as calculated from the following:    Height as of this encounter: 1.6 m (5' 3\").    Weight as of this encounter: 79.4 kg (175 lb).  Medications and allergies reviewed.      Nydia ARAUJO MA    "

## 2024-01-03 NOTE — LETTER
"    1/3/2024         RE: Sangita Montano  [address]  [facility] [place] [facility] [address]        Dear Colleague,    Thank you for referring your patient, Sangita Montano, to the [facility] [place]. Please see a copy of my visit note below.    Sangita Montano is a 58 year old female who presents with a chief complaint of a painful ingrown toenail to the right great toe.  The patient relates pain when wearing shoes.  The patient denies any redness extending up the big toe into the foot.  The patient relates the condition has been getting worse over the past several weeks.         Pertinent medical, surgical and family history was reviewed in the chart.    Vitals: BP (!) 162/83   Pulse 92   Ht 1.6 m (5' 3\")   Wt 79.4 kg (175 lb)   LMP 10/28/2019   BMI 31.00 kg/m    BMI= Body mass index is 31 kg/m .    LOWER EXTREMITY PHYSICAL EXAM    Dermatologic: One notes an inflamed nail border of the right great toe.  There is noted erythema and edema located around the labial fold and does not extend past the interphalangeal joint.  There is no apparent purulent drainage noted.  There is pain on palpation to the proximal aspect of the nail border.  Otherwise, the skin is intact to both lower extremities without significant lesions, rash or abrasion.           Vascular: DP & PT pulses are intact & regular on the right.   CFT and skin temperature is normal to the right lower extremities.     Neurologic: Lower extremity sensation is intact to light touch.  No evidence of weakness in the right lower extremities.        Musculoskeletal: Patient is ambulatory without assistive device or brace.  No gross ankle deformity noted.  No foot or ankle joint effusion is noted.         ASSESSMENT / PLAN:     ICD-10-CM    1. Ingrown nail of great toe of right foot  L60.0 REMOVAL NAIL/NAIL BED, PARTIAL OR COMPLETE      2. Pain due to onychomycosis of toenails of both feet  B35.1 ciclopirox (PENLAC) 8 % " external solution    M79.675     M79.674           Plan:  I have explained to Sangita about the condition.  The potential causes and nature of an ingrown toenail were discussed with the patient.  We reviewed the natural history and prognosis of the condition and potential risks if no treatment is provided.  Treatment options discussed included conservative management (oral antibiotics, soaking of foot, adequate width shoes)  as well as surgical management (partial or total nail removal).  The pros and cons of both forms as well as risks and benefits of treatment were reviewed.  The patient was prescribed Penlac 8% topical antifungal solution to be applied to the right great toenail daily for up to 6-9 months.     At this point, I recommended having the offending nail border permanently removed from the right great toe.  I have explained to the patient all of the possible risks, benefits, alternatives to the procedure.  The patient consented to the proposed procedure.  The right hallux was swabbed with alcohol.  Next, approximately 3 cc of 1% lidocaine plain was injected around the right hallux.  The right hallux was then prepped with Betadine ointment.  A tourniquet was applied to the right hallux.  A [place] elevator was utilized to free up the eponychium of the offending nail border.  Next, the offending nail border was split using an English anvil back to the matrix.  Next, utilizing a straight hemostat, the offending nail border was avulsed in toto.  The wound bed was debrided on any remaining nail and hyperkeratotic skin.  Next, the offending nail matrix was treated with 89% phenol using microtip cotton applicators for 30 seconds.  The wound was then irrigated and dressed with bacitracin antibiotic ointment and a compressive  sterile dressing.  The tourniquet was removed and a prompt hyperemic response was noted.  The patient tolerated the procedure well with no complications.  The patient was given post  procedure instructions for the care of the wound.  The patient was informed that it is common to experience redness with watery drainage coming from the treated areas of the toe related to the phenol application.  The patient will return in two weeks for reevaluation and was instructed to notify the office if any redness extending past the big toe joint or fever with chills are experienced before then.      There is low risk of morbidity with the procedure.  There was no overlap in work associated with the evaluation/management and the work associated with the procedure.    Sangita verbalized agreement with and understanding of the rational for the diagnosis and treatment plan.  All questions were answered to best of my ability and the patient's satisfaction. The patient was advised to contact the clinic with any questions that may arise after the clinic visit.      Disclaimer: This note consists of symbols derived from keyboarding, dictation and/or voice recognition software. As a result, there may be errors in the script that have gone undetected. Please consider this when interpreting information found in this chart.      THAI Rogers D.P.M., F.A.C.F.A.S.      Again, thank you for allowing me to participate in the care of your patient.        Sincerely,        Sagar Rogers DPM

## 2024-02-05 ENCOUNTER — PATIENT OUTREACH (OUTPATIENT)
Dept: CARE COORDINATION | Facility: CLINIC | Age: 59
End: 2024-02-05
Payer: COMMERCIAL

## 2024-02-19 ENCOUNTER — PATIENT OUTREACH (OUTPATIENT)
Dept: CARE COORDINATION | Facility: CLINIC | Age: 59
End: 2024-02-19
Payer: COMMERCIAL

## 2024-04-07 ENCOUNTER — HEALTH MAINTENANCE LETTER (OUTPATIENT)
Age: 59
End: 2024-04-07

## 2024-04-11 ENCOUNTER — OFFICE VISIT (OUTPATIENT)
Dept: UROLOGY | Facility: CLINIC | Age: 59
End: 2024-04-11
Payer: COMMERCIAL

## 2024-04-11 VITALS
SYSTOLIC BLOOD PRESSURE: 147 MMHG | BODY MASS INDEX: 30.65 KG/M2 | HEART RATE: 109 BPM | WEIGHT: 173 LBS | TEMPERATURE: 99.1 F | DIASTOLIC BLOOD PRESSURE: 84 MMHG | HEIGHT: 63 IN

## 2024-04-11 DIAGNOSIS — C67.2 MALIGNANT NEOPLASM OF LATERAL WALL OF URINARY BLADDER (H): Primary | ICD-10-CM

## 2024-04-11 PROCEDURE — 99213 OFFICE O/P EST LOW 20 MIN: CPT | Mod: 25 | Performed by: UROLOGY

## 2024-04-11 PROCEDURE — 88112 CYTOPATH CELL ENHANCE TECH: CPT | Performed by: PATHOLOGY

## 2024-04-11 PROCEDURE — 52000 CYSTOURETHROSCOPY: CPT | Performed by: UROLOGY

## 2024-04-11 NOTE — PROGRESS NOTES
CC: 60 yo female with LG Ta bladder cancer in 2013.  Had induction BCG and no recurrences since then.  Here for cystoscopy.     Cystoscopy: after informed consent was obtained, the patient was prepped and draped in standard sterile fashion. The flexible cystoscope was introduced into the patient's urethra without difficulty. There were no strictures in the urethra. Upon entering the bladder, the UOs were orthotopic and effluxing clear urine. There were no mucosal lesions, stones or foreign objects noted in the bladder. The remainder of the exam was normal.        ASSESSMENT AND PLAN: Over half of today's 25-minute visit was spent reviewing the chart, results and counseling the patient regarding her bladder cancer.  We are pleased to see no evidence of recurrence.  The patient was offered a cysto in 2 years, but she prefers to do it again in 1 year.  We will see her then.

## 2024-04-11 NOTE — NURSING NOTE
"Initial BP (!) 147/84 (BP Location: Left arm, Patient Position: Sitting, Cuff Size: Adult Regular)   Pulse 109   Temp 99.1  F (37.3  C) (Tympanic)   Ht 1.6 m (5' 2.99\")   Wt 78.5 kg (173 lb)   LMP 10/28/2019   BMI 30.65 kg/m   Estimated body mass index is 30.65 kg/m  as calculated from the following:    Height as of this encounter: 1.6 m (5' 2.99\").    Weight as of this encounter: 78.5 kg (173 lb). .  Edith Manzanares MA    "

## 2024-04-17 ENCOUNTER — TELEPHONE (OUTPATIENT)
Dept: UROLOGY | Facility: CLINIC | Age: 59
End: 2024-04-17
Payer: COMMERCIAL

## 2024-04-17 NOTE — TELEPHONE ENCOUNTER
Left a vm, urine cytology was negative, and a reminder to make appt for 1 year.  janna lopez LPN

## 2024-05-23 ENCOUNTER — VIRTUAL VISIT (OUTPATIENT)
Dept: SLEEP MEDICINE | Facility: CLINIC | Age: 59
End: 2024-05-23
Payer: COMMERCIAL

## 2024-05-23 VITALS — WEIGHT: 173 LBS | HEIGHT: 63 IN | BODY MASS INDEX: 30.65 KG/M2

## 2024-05-23 DIAGNOSIS — G47.33 OSA (OBSTRUCTIVE SLEEP APNEA): Primary | ICD-10-CM

## 2024-05-23 PROCEDURE — 99204 OFFICE O/P NEW MOD 45 MIN: CPT | Mod: 95 | Performed by: INTERNAL MEDICINE

## 2024-05-23 ASSESSMENT — SLEEP AND FATIGUE QUESTIONNAIRES
HOW LIKELY ARE YOU TO NOD OFF OR FALL ASLEEP IN A CAR, WHILE STOPPED FOR A FEW MINUTES IN TRAFFIC: WOULD NEVER DOZE
HOW LIKELY ARE YOU TO NOD OFF OR FALL ASLEEP WHILE SITTING INACTIVE IN A PUBLIC PLACE: WOULD NEVER DOZE
HOW LIKELY ARE YOU TO NOD OFF OR FALL ASLEEP WHILE WATCHING TV: SLIGHT CHANCE OF DOZING
HOW LIKELY ARE YOU TO NOD OFF OR FALL ASLEEP WHILE SITTING AND READING: WOULD NEVER DOZE
HOW LIKELY ARE YOU TO NOD OFF OR FALL ASLEEP WHILE LYING DOWN TO REST IN THE AFTERNOON WHEN CIRCUMSTANCES PERMIT: MODERATE CHANCE OF DOZING
HOW LIKELY ARE YOU TO NOD OFF OR FALL ASLEEP WHILE SITTING QUIETLY AFTER LUNCH WITHOUT ALCOHOL: WOULD NEVER DOZE
HOW LIKELY ARE YOU TO NOD OFF OR FALL ASLEEP WHEN YOU ARE A PASSENGER IN A CAR FOR AN HOUR WITHOUT A BREAK: WOULD NEVER DOZE
HOW LIKELY ARE YOU TO NOD OFF OR FALL ASLEEP WHILE SITTING AND TALKING TO SOMEONE: WOULD NEVER DOZE

## 2024-05-23 ASSESSMENT — PAIN SCALES - GENERAL: PAINLEVEL: NO PAIN (0)

## 2024-05-23 NOTE — PATIENT INSTRUCTIONS
Please perform the HST with Mandibular Advancement Device.    What is a Home Sleep Study?    your doctor can give you a portable sleep monitor to use at home, so you don t have to spend the night in the sleep lab. But you should use a portable monitor only if:   ?Your doctor thinks you have a condition that makes you stop breathing for short periods while you are asleep, called  sleep apnea.    ?You do not have other serious medical problems, such as heart disease or lung disease.    Please bring the home sleep study device back to the sleep center as soon as you are finished with it so we can score it.     The cost of care estimate line is [phone]. They are able to give the patient an estimate of the charges and also an estimate of their insurance coverage/patient responsibility.   After your sleep study is performed, please call us at [phone] or [phone] to schedule for a follow up to review the results of the sleep study.    Consider using one tab of low dose melatonin 3 mg or less on the night of the study.    It is completely voluntary.    Do not drive or operate machinery after intake of melatonin.     Due to the pandemic, we have many people waiting in line for sleep studies- this wait list is improving each week.   You should receive a call within 2 weeks from our staff to schedule you for  your test.   Depending on the delay in approval by your insurance carrier, the study will be completed within a few days to 2 weeks of that call.    Please make every effort you can to sleep on your back with one pillow behind your head.    Please also call your insurance company next week to see if your sleep study is approved and find out your co-pay.

## 2024-05-23 NOTE — NURSING NOTE
Is the patient currently in the state of [place]? YES    Visit mode:VIDEO    If the visit is dropped, the patient can be reconnected by: VIDEO VISIT: Text to cell phone:   Telephone Information:   Mobile [phone]       Will anyone else be joining the visit? NO  (If patient encounters technical issues they should call [phone])    How would you like to obtain your AVS? MyChart    Are changes needed to the allergy or medication list? No    Are refills needed on medications prescribed by this physician? NO    Reason for visit: RECHECK    Dane TANNER

## 2024-05-23 NOTE — PROGRESS NOTES
Virtual Visit Details    Type of service:  Video Visit     Originating Location (pt. Location): Home  Distant Location (provider location):  Off-site  Platform used for Video Visit: AmWell    Additional 15 minutes on the date of service was spent performing the following:    -Preparing to see the patient  -Obtaining and/or reviewing separately obtained history   -Ordering medications, tests, or procedures   -Documenting clinical information in the electronic or other health record     Thank you for the opportunity to participate in the care of Sangita Montano.     She is a 59 year old y/o female patient who comes to the sleep medicine clinic for follow up. The patient was diagnosed with NELLIE on 12/05/2022 (AHI=15.4). The patient has been using a mandibular advancement device to treat her NELLIE since her last visit with us. While she states that she is sleeping fine on the current adjustment settings, her dentist wants her to get an HST to verify it is treating her NELLIE.     Assessment and Plan:  In summary Sangita Montano is a 59 year old year old female who is here for follow up.    1. NELLIE (obstructive sleep apnea)  I will put in an order for her to get the HST done with mandibular advancement device. RTC after it has been completed to review results.  - HST - Home Sleep Apnea Test - Noxturnal, T-3 Returnable; Future    Lab reviewed: Discussed with patient.    Sleep-Wake Cycle:    The patient likes to initiate sleep at around 10:30 PM with a sleep latency of 20 minutes. The patient has 1 nocturnal awakenings. Final wake up time is around 6 AM.    DANNIE:  DANNIE Total Score: 2  Total score - [place]: 3 (5/23/2024  8:51 AM)    Failed to redirect to the Timeline version of the REVFS SmartLink.   Patient Active Problem List   Diagnosis    Bladder cancer (H)    CARDIOVASCULAR SCREENING; LDL GOAL LESS THAN 160    Mechanical strabismus, unspecified    Convergence insufficiency or palsy in binocular eye movement     Class 1 obesity due to excess calories with serious comorbidity and body mass index (BMI) of 31.0 to 31.9 in adult    Dyspepsia    NELLIE (obstructive sleep apnea)- moderate (AHI 15)    Chronic insomnia    White coat syndrome without diagnosis of hypertension    Endometrial hyperplasia without atypia       Past Medical History:   Diagnosis Date    Bladder cancer (H) 07/2013    Excessive or frequent menstruation 03/25/2006    Neoplasm of uncertain behavior of skin 03/29/2012       Past Surgical History:   Procedure Laterality Date    COLONOSCOPY N/A 3/2/2016    Procedure: COLONOSCOPY;  Surgeon: Bala Pineda MD;  Location: [facility]    CYSTOSCOPY, TRANSURETHRAL RESECTION (TUR) PROSTATE, COMBINED  7/3/2013    Procedure: COMBINED CYSTOSCOPY, TRANSURETHRAL RESECTION (TUR) PROSTATE;  Transurethral Resection of Bladder Cancer;  Surgeon: DONG Wright MD;  Location: [facility]    DILATION AND CURETTAGE, OPERATIVE HYSTEROSCOPY, COMBINED N/A 5/24/2023    Procedure: HYSTEROSCOPY, WITH DILATION AND CURETTAGE OF UTERUS;  Surgeon: Yu Strong DO;  Location: [facility]    EYE SURGERY         Current Outpatient Medications   Medication Sig Dispense Refill    ascorbic acid (VITAMIN C) 500 MG tablet Take by mouth daily 3511-6399 mg daily      Cholecalciferol (VITAMIN D-3) 5000 UNITS TABS       ciclopirox (PENLAC) 8 % external solution Apply topically daily Apply to affected nails daily.  Remove the residual build up weekly with nail polish remover or an yariel board. 6.6 mL 1    Dexchlorphen-PSE-Methscop (D-HIST D PO)       fish oil-omega-3 fatty acids 1000 MG capsule Take 1,000 mg by mouth daily 3 tablets daily      MAGNESIUM GLYCINATE 100 mg qd      multivitamin, therapeutic (THERA-VIT) TABS tablet Take 1 tablet by mouth daily      NALTREXONE HCL PO Take 4.5 mg by mouth daily      norethindrone (AYGESTIN) 5 MG tablet Take 2 tablets (10 mg) by mouth daily 180 tablet 3    progesterone (PROMETRIUM) 200 MG capsule Take 1 capsule (200  "mg) by mouth daily 90 capsule 3    rizatriptan (MAXALT) 5 MG tablet Take 1 tablet (5 mg) by mouth at onset of headache for migraine May repeat in 2 hours. Max 6 tablets/24 hours. 15 tablet 1    UNABLE TO FIND MEDICATION NAME: Biotic 7      Zinc 10 MG LOZG          Allergies   Allergen Reactions    Other Environmental Allergy Other (See Comments)     Airborne pollen: Runny nose, sneezing, and fatigue.  Processed meat (only tolerates fresh meat): Rash and migraine  Fermented foods: Rash and migraine  Vinegar: Rash and migraine    Penicillins Hives    Pollen Extract-Tree Extract [Pollen Extract] Other (See Comments)     Sneezing, fatigue, and runny nose    Sulfa Antibiotics Hives    Avocado Rash     Migraine    Crab Extract Rash    Gluten Meal Rash     Migraine    Milk (Cow) Rash     Migraine    Other Food Allergy Rash     migraine    Solanum Lycopersicum [Tomato] Rash     Migraine       Visual  Exam:  GEN: NAD,  Psych: normal mood, normal affect    Labs/Studies:      No results found for: \"PH\", \"PHARTERIAL\", \"PO2\", \"[id number]\", \"SAT\", \"PCO2\", \"HCO3\", \"BASEEXCESS\", \"IVELISSE\", \"BEB\"  Lab Results   Component Value Date    TSH 1.04 05/18/2023    TSH 0.95 04/07/2022     Lab Results   Component Value Date     (H) 05/18/2023     (H) 12/23/2020     Lab Results   Component Value Date    HGB 14.2 05/18/2023    HGB 13.7 12/23/2020     Lab Results   Component Value Date    BUN 16.6 05/18/2023    BUN 16 12/23/2020    CR 0.79 05/18/2023    CR 0.86 12/23/2020     Lab Results   Component Value Date    AST 30 05/18/2023    AST 18 12/23/2020    ALT 33 05/18/2023    ALT 31 12/23/2020    ALKPHOS 65 05/18/2023    ALKPHOS 69 12/23/2020    BILITOTAL 0.4 05/18/2023    BILITOTAL 0.5 12/23/2020     No results found for: \"UAMP\", \"UBARB\", \"BENZODIAZEUR\", \"UCANN\", \"UCOC\", \"OPIT\", \"UPCP\"    Recent Labs   Lab Test 06/02/23  1307 05/18/23  1059 12/23/20  1024   NA  --  141 136   POTASSIUM  --  4.3 3.9   CHLORIDE  --  104 102   CO2  --  " 26 29   ANIONGAP  --  11 5   GLC  --  108* 101*   BUN  --  16.6 16   CR  --  0.79 0.86   RICK 9.5 10.4* 9.8       Ferritin   Date Value Ref Range Status   12/23/2020 120 8 - 252 ng/mL Final     Patient verbalized understanding of these issues, agrees with the plan and all questions were answered today. Patient was given an opportuntity to voice any other symptoms or concerns not listed above. Patient did not have any other symptoms or concerns.      Bong Chaudhry DO  Board Certified in Internal Medicine and Sleep Medicine    (Note created with Dragon voice recognition and unintended spelling errors and word substitutions may occur)     Audio and visual devices were used for this virtual clinic visit with permission from patient.

## 2024-07-03 ENCOUNTER — OFFICE VISIT (OUTPATIENT)
Dept: OBGYN | Facility: CLINIC | Age: 59
End: 2024-07-03
Payer: COMMERCIAL

## 2024-07-03 VITALS
WEIGHT: 166.2 LBS | SYSTOLIC BLOOD PRESSURE: 144 MMHG | TEMPERATURE: 99.1 F | RESPIRATION RATE: 18 BRPM | DIASTOLIC BLOOD PRESSURE: 85 MMHG | HEART RATE: 95 BPM | HEIGHT: 63 IN | BODY MASS INDEX: 29.45 KG/M2

## 2024-07-03 DIAGNOSIS — N85.00 ENDOMETRIAL HYPERPLASIA WITHOUT ATYPIA: Primary | ICD-10-CM

## 2024-07-03 PROCEDURE — 99459 PELVIC EXAMINATION: CPT | Performed by: OBSTETRICS & GYNECOLOGY

## 2024-07-03 PROCEDURE — 88305 TISSUE EXAM BY PATHOLOGIST: CPT | Performed by: PATHOLOGY

## 2024-07-03 PROCEDURE — 99213 OFFICE O/P EST LOW 20 MIN: CPT | Mod: 25 | Performed by: OBSTETRICS & GYNECOLOGY

## 2024-07-03 PROCEDURE — 58100 BIOPSY OF UTERUS LINING: CPT | Performed by: OBSTETRICS & GYNECOLOGY

## 2024-07-03 RX ORDER — NORETHINDRONE ACETATE 5 MG
5 TABLET ORAL DAILY
Qty: 90 TABLET | Refills: 3 | Status: SHIPPED | OUTPATIENT
Start: 2024-07-03

## 2024-07-03 NOTE — PROGRESS NOTES
[facility]    Gynecology Office Note    CC:   Chief Complaint   Patient presents with    Follow Up        HPI: Sangita Montano is a 59 year old  who presents for follow up for endometrial hyperplasia without atypia.  She has no new concerns today. She decreased her Aygestin dose to 5mg and is tolerating much better. Takes and night and is sleeping better but no issues with fatigue. Thinks it might be helping with keeping her weight now. Denies any vaginal bleeding or other concerns.     GYN Hx:     Patient is menopausal: yes     Patient's last menstrual period was 10/28/2019.     Endometrial hyperplasia without atypia  Last Pap Smear: 2023 NIL, HPV negative    ROS: A 10 pt ROS was completed and found to be otherwise negative unless mentioned in the HPI.     PMH:   Past Medical History:   Diagnosis Date    Bladder cancer (H) 2013    Excessive or frequent menstruation 2006    Neoplasm of uncertain behavior of skin 2012       PSHx:   Past Surgical History:   Procedure Laterality Date    COLONOSCOPY N/A 3/2/2016    Procedure: COLONOSCOPY;  Surgeon: Bala Pineda MD;  Location: [place] [facility]    CYSTOSCOPY, TRANSURETHRAL RESECTION (TUR) PROSTATE, COMBINED  7/3/2013    Procedure: COMBINED CYSTOSCOPY, TRANSURETHRAL RESECTION (TUR) PROSTATE;  Transurethral Resection of Bladder Cancer;  Surgeon: DONG Wright MD;  Location: [facility]    DILATION AND CURETTAGE, OPERATIVE HYSTEROSCOPY, COMBINED N/A 2023    Procedure: HYSTEROSCOPY, WITH DILATION AND CURETTAGE OF UTERUS;  Surgeon: Yu Strong DO;  Location: [facility] [facility]    EYE SURGERY         OBHx:   OB History    Para Term  AB Living   0 0 0 0 0 0   SAB IAB Ectopic Multiple Live Births   0 0 0 0 0       Medications:   Current Outpatient Medications   Medication Sig Dispense Refill    ascorbic acid (VITAMIN C) 500 MG tablet Take by mouth daily 7161-3917 mg daily      Cholecalciferol (VITAMIN D-3) 5000  UNITS TABS       ciclopirox (PENLAC) 8 % external solution Apply topically daily Apply to affected nails daily.  Remove the residual build up weekly with nail polish remover or an yariel board. 6.6 mL 1    Dexchlorphen-PSE-Methscop (D-HIST D PO)       fish oil-omega-3 fatty acids 1000 MG capsule Take 1,000 mg by mouth daily 3 tablets daily      MAGNESIUM GLYCINATE 100 mg qd      multivitamin, therapeutic (THERA-VIT) TABS tablet Take 1 tablet by mouth daily      NALTREXONE HCL PO Take 4.5 mg by mouth daily      norethindrone (AYGESTIN) 5 MG tablet Take 1 tablet (5 mg) by mouth daily 90 tablet 3    rizatriptan (MAXALT) 5 MG tablet Take 1 tablet (5 mg) by mouth at onset of headache for migraine May repeat in 2 hours. Max 6 tablets/24 hours. 15 tablet 1    UNABLE TO FIND MEDICATION NAME: Biotic 7      Zinc 10 MG LOZG        No current facility-administered medications for this visit.       Allergies:      Allergies   Allergen Reactions    Other Environmental Allergy Other (See Comments)     Airborne pollen: Runny nose, sneezing, and fatigue.  Processed meat (only tolerates fresh meat): Rash and migraine  Fermented foods: Rash and migraine  Vinegar: Rash and migraine    Penicillins Hives    Pollen Extract-Tree Extract [Pollen Extract] Other (See Comments)     Sneezing, fatigue, and runny nose    Sulfa Antibiotics Hives    Avocado Rash     Migraine    Crab Extract Rash    Gluten Meal Rash     Migraine    Milk (Cow) Rash     Migraine    Other Food Allergy Rash     migraine    Solanum Lycopersicum [Tomato] Rash     Migraine       Social History:   Social History     Socioeconomic History    Marital status: Single     Spouse name: Not on file    Number of children: Not on file    Years of education: Not on file    Highest education level: Not on file   Occupational History    Occupation: Investigator     Employer: [employer]   Tobacco Use    Smoking status: Never    Smokeless tobacco: Never   Vaping Use    Vaping status: Never Used    Substance and Sexual Activity    Alcohol use: No    Drug use: No    Sexual activity: Never   Other Topics Concern    Parent/sibling w/ CABG, MI or angioplasty before 65F 55M? Yes     Comment: Brother heart attack   Social History Narrative    Not on file     Social Determinants of Health     Financial Resource Strain: Low Risk  (9/22/2023)    Financial Resource Strain     Within the past 12 months, have you or your family members you live with been unable to get utilities (heat, electricity) when it was really needed?: No   Food Insecurity: Low Risk  (9/22/2023)    Food Insecurity     Within the past 12 months, did you worry that your food would run out before you got money to buy more?: No     Within the past 12 months, did the food you bought just not last and you didn t have money to get more?: No   Transportation Needs: Low Risk  (9/22/2023)    Transportation Needs     Within the past 12 months, has lack of transportation kept you from medical appointments, getting your medicines, non-medical meetings or appointments, work, or from getting things that you need?: No   Physical Activity: Not on file   Stress: Not on file   Social Connections: Unknown (12/27/2021)    Received from [facility], [facility]    Social Connections     Frequency of Communication with Friends and Family: Not on file   Interpersonal Safety: Low Risk  (9/22/2023)    Interpersonal Safety     Do you feel physically and emotionally safe where you currently live?: Yes     Within the past 12 months, have you been hit, slapped, kicked or otherwise physically hurt by someone?: No     Within the past 12 months, have you been humiliated or emotionally abused in other ways by your partner or ex-partner?: No   Housing Stability: Low Risk  (9/22/2023)    Housing Stability     Do you have housing? : Yes     Are you worried about losing your housing?: No         Family History:   Family  "History   Problem Relation Age of Onset    Cerebrovascular Disease Mother     Hypertension Father     Cancer Father         bladder cancer,  liver cancer    Heart Disease Father     Other Cancer Father         Bladder Cancer    Heart Disease Brother 46        MI    Diabetes Brother     Coronary Artery Disease Brother     Breast Cancer No family hx of        Physical Exam:   Vitals:    07/03/24 1436   BP: (!) 144/85   BP Location: Right arm   Patient Position: Sitting   Cuff Size: Adult Regular   Pulse: 95   Resp: 18   Temp: 99.1  F (37.3  C)   TempSrc: Tympanic   Weight: 75.4 kg (166 lb 3.2 oz)   Height: 1.6 m (5' 3\")      Estimated body mass index is 29.44 kg/m  as calculated from the following:    Height as of this encounter: 1.6 m (5' 3\").    Weight as of this encounter: 75.4 kg (166 lb 3.2 oz).    General appearance: well-hydrated, A&O x 3, no apparent distress  Lungs: Equal expansion bilaterally, no accessory muscle use  Heart: No heaves or thrills.   Constitutional: See vitals  Extremities: no edema  Neuro: CN II-XII grossly intact  Genitourinary:  External genitalia: no erythema, no lesions.   Urethral meatus appropriate location without lesions or prolapse  Anus and Perineum: Unremarkable, no visible lesions  Vagina: Normal, healthy pink mucosa without any lesions. Physiologic vaginal discharge.   Cervix: normal appearance, no cervical motion tenderness.       Endometrial Biopsy Procedure Note      Sangita Montano  1965  [id number]    Indications:    Sangita Montano is a 59 year old year old female, who is having an endometrial biopsy for  follow up of endometrial hyperplasia without atypia.    Procedure:  Is a pregnancy test required: No.    Prior to the start of the procedure, written and verbal consent was obtained from the patient. The patient was then placed in the dorsal lithotomy position.  A speculum was placed in the vagina and the cervix visualized. The cervix was cleaned with " betadine swabs x3. A tenaculum was placed on the cervix. A small plastic 5 mm Pipelle syringe curette was passed through the cervix to the fundus with return of scant amount of tissue. This was placed in specimen jar and sent for permanent pathology. All instruments were removed.      The patient tolerated the procedure fairly well.    Post Procedure:    PLAN : Await the results of the biopsy. There were no complications. Patient was discharged in stable condition.    The patient was given post op instructions which included activity and pelvic restrictions.  She was advised to call the clinic if excessive bleeding, pelvic pain, or fever.     Follow-up as below.        Assessment and Plan:     Encounter Diagnosis   Name Primary?    Endometrial hyperplasia without atypia Yes     Patient has a history of endometrial hyperplasia without atypia which has been treated with Progestin therapy. Last biopsy 12/2023 showed resolution of hyperplasia.    Recommended follow up:  - Recommended plan to continue progestin therapy for 12 to 24 months with continued monitoring with endometrial sampling after resolution of hyperplasia.  - If today's biopsy is again negative, plan follow up in 6 months for repeat biopsy. If this remains negative x1 year (12/2024), will plan to discontinue medication therapy and follow up 1 year later (12/2025) for final surveillance biopsy.   -If progression of hyperplasia, will plan appropriate follow up.    Health maintenance: pap smear up to date    Yu Strong DO

## 2024-07-03 NOTE — NURSING NOTE
"Initial BP (!) 144/85 (BP Location: Right arm, Patient Position: Sitting, Cuff Size: Adult Regular)   Pulse 95   Temp 99.1  F (37.3  C) (Tympanic)   Resp 18   Ht 1.6 m (5' 3\")   Wt 75.4 kg (166 lb 3.2 oz)   LMP 10/28/2019   BMI 29.44 kg/m   Estimated body mass index is 29.44 kg/m  as calculated from the following:    Height as of this encounter: 1.6 m (5' 3\").    Weight as of this encounter: 75.4 kg (166 lb 3.2 oz). .    "

## 2024-07-15 DIAGNOSIS — E55.9 VITAMIN D DEFICIENCY: Primary | ICD-10-CM

## 2024-07-23 ENCOUNTER — LAB (OUTPATIENT)
Dept: LAB | Facility: CLINIC | Age: 59
End: 2024-07-23
Payer: COMMERCIAL

## 2024-07-23 DIAGNOSIS — E55.9 VITAMIN D DEFICIENCY: ICD-10-CM

## 2024-07-23 DIAGNOSIS — L65.9 THINNING HAIR: Primary | ICD-10-CM

## 2024-07-23 LAB
T3FREE SERPL-MCNC: 2.7 PG/ML (ref 2–4.4)
T4 FREE SERPL-MCNC: 1.13 NG/DL (ref 0.9–1.7)
TSH SERPL DL<=0.005 MIU/L-ACNC: 1.14 UIU/ML (ref 0.3–4.2)
VIT D+METAB SERPL-MCNC: 68 NG/ML (ref 20–50)

## 2024-07-23 PROCEDURE — 36415 COLL VENOUS BLD VENIPUNCTURE: CPT

## 2024-07-23 PROCEDURE — 82306 VITAMIN D 25 HYDROXY: CPT

## 2024-07-23 PROCEDURE — 84443 ASSAY THYROID STIM HORMONE: CPT

## 2024-07-23 PROCEDURE — 84439 ASSAY OF FREE THYROXINE: CPT

## 2024-07-23 PROCEDURE — 84481 FREE ASSAY (FT-3): CPT

## 2024-08-13 DIAGNOSIS — E06.3 HASHIMOTO'S THYROIDITIS: Primary | ICD-10-CM

## 2024-09-23 ENCOUNTER — HOSPITAL ENCOUNTER (OUTPATIENT)
Dept: MAMMOGRAPHY | Facility: CLINIC | Age: 59
Discharge: HOME | End: 2024-09-23
Admitting: INTERNAL MEDICINE
Payer: COMMERCIAL

## 2024-09-23 DIAGNOSIS — Z12.31 VISIT FOR SCREENING MAMMOGRAM: ICD-10-CM

## 2024-09-23 PROCEDURE — 77063 BREAST TOMOSYNTHESIS BI: CPT

## 2024-09-30 ENCOUNTER — IMMUNIZATION (OUTPATIENT)
Dept: FAMILY MEDICINE | Facility: CLINIC | Age: 59
End: 2024-09-30
Payer: COMMERCIAL

## 2024-09-30 PROCEDURE — 90480 ADMN SARSCOV2 VAC 1/ONLY CMP: CPT

## 2024-09-30 PROCEDURE — 91320 SARSCV2 VAC 30MCG TRS-SUC IM: CPT

## 2024-11-18 ENCOUNTER — TELEPHONE (OUTPATIENT)
Dept: DERMATOLOGY | Facility: CLINIC | Age: 59
End: 2024-11-18
Payer: COMMERCIAL

## 2024-11-18 NOTE — TELEPHONE ENCOUNTER
"Spoke to patient and she has not been seen by her PCP yet, denies history of skin cancer.     \"I noticed the spot on my leg is starting to change and it bled after I scratched it, it has gotten a little darker..\"     I did advise patient schedule an appointment with her PCP and if PCP feels she needs to be seen by Derm, the PCP will enter a referral for a sooner Derm appt.     (We are booking into late May-early July right now)    Patient verbalized understanding. Britney Palma RN    "

## 2024-11-18 NOTE — TELEPHONE ENCOUNTER
[facility] Call Center    Phone Message    May a detailed message be left on voicemail: yes     Reason for Call: Symptoms or Concerns     If patient has red-flag symptoms, warm transfer to triage line    Current symptom or concern: mole on leg did scratch and was bleeding.   Pt has 2 moles on face as well not changing     Symptoms have been present for:  2 week(s)    Has patient previously been seen for this? No    By : NA    Date: NA    Are there any new or worsening symptoms? Yes: please call pt back to discuss and schedule.     Action Taken: Message routed to:  Other: [place] [facility]    Travel Screening: Not Applicable     Date of Service:

## 2024-12-26 ENCOUNTER — IMMUNIZATION (OUTPATIENT)
Dept: FAMILY MEDICINE | Facility: CLINIC | Age: 59
End: 2024-12-26
Payer: COMMERCIAL

## 2025-01-08 ENCOUNTER — LAB (OUTPATIENT)
Dept: LAB | Facility: CLINIC | Age: 60
End: 2025-01-08
Payer: COMMERCIAL

## 2025-01-08 DIAGNOSIS — R76.8 THYROID ANTIBODY POSITIVE: Primary | ICD-10-CM

## 2025-01-08 DIAGNOSIS — E67.3 HYPERVITAMINOSIS D: ICD-10-CM

## 2025-01-08 DIAGNOSIS — E06.3 HASHIMOTO'S THYROIDITIS: ICD-10-CM

## 2025-01-08 LAB
HOLD SPECIMEN: NORMAL
T4 FREE SERPL-MCNC: 1.21 NG/DL (ref 0.9–1.7)
TSH SERPL DL<=0.005 MIU/L-ACNC: 1.5 UIU/ML (ref 0.3–4.2)

## 2025-01-08 PROCEDURE — 86800 THYROGLOBULIN ANTIBODY: CPT

## 2025-01-08 PROCEDURE — 84439 ASSAY OF FREE THYROXINE: CPT

## 2025-01-08 PROCEDURE — 84481 FREE ASSAY (FT-3): CPT

## 2025-01-08 PROCEDURE — 82306 VITAMIN D 25 HYDROXY: CPT

## 2025-01-08 PROCEDURE — 86376 MICROSOMAL ANTIBODY EACH: CPT

## 2025-01-08 PROCEDURE — 36415 COLL VENOUS BLD VENIPUNCTURE: CPT

## 2025-01-08 PROCEDURE — 84443 ASSAY THYROID STIM HORMONE: CPT

## 2025-01-09 LAB
T3FREE SERPL-MCNC: 3.3 PG/ML (ref 2–4.4)
THYROGLOB AB SERPL IA-ACNC: <20 IU/ML
THYROPEROXIDASE AB SERPL-ACNC: 51 IU/ML
VIT D+METAB SERPL-MCNC: 51 NG/ML (ref 20–50)

## 2025-01-22 ENCOUNTER — PATIENT OUTREACH (OUTPATIENT)
Dept: CARE COORDINATION | Facility: CLINIC | Age: 60
End: 2025-01-22
Payer: COMMERCIAL

## 2025-01-30 ENCOUNTER — OFFICE VISIT (OUTPATIENT)
Dept: OBGYN | Facility: CLINIC | Age: 60
End: 2025-01-30
Payer: COMMERCIAL

## 2025-01-30 VITALS
TEMPERATURE: 98.1 F | HEART RATE: 107 BPM | DIASTOLIC BLOOD PRESSURE: 82 MMHG | BODY MASS INDEX: 30.62 KG/M2 | SYSTOLIC BLOOD PRESSURE: 165 MMHG | WEIGHT: 172.8 LBS | HEIGHT: 63 IN | RESPIRATION RATE: 16 BRPM

## 2025-01-30 DIAGNOSIS — N85.00 ENDOMETRIAL HYPERPLASIA WITHOUT ATYPIA: Primary | ICD-10-CM

## 2025-01-30 RX ORDER — NORETHINDRONE 5 MG/1
5 TABLET ORAL DAILY
Qty: 90 TABLET | Refills: 3 | Status: SHIPPED | OUTPATIENT
Start: 2025-01-30

## 2025-01-30 NOTE — NURSING NOTE
"Initial BP (!) 165/82 (BP Location: Right arm, Patient Position: Sitting, Cuff Size: Adult Regular)   Pulse 107   Temp 98.1  F (36.7  C) (Tympanic)   Resp 16   Ht 1.6 m (5' 3\")   Wt 78.4 kg (172 lb 12.8 oz)   LMP 10/28/2019   BMI 30.61 kg/m   Estimated body mass index is 30.61 kg/m  as calculated from the following:    Height as of this encounter: 1.6 m (5' 3\").    Weight as of this encounter: 78.4 kg (172 lb 12.8 oz). .    "

## 2025-01-30 NOTE — PROGRESS NOTES
[facility]    Gynecology Office Note    CC:   Chief Complaint   Patient presents with    Consult        HPI: Sangita Montano is a 59 year old  who presents for follow up for endometrial hyperplasia without atypia.  Patient has no new concerns or complaints today.  Denies any vaginal bleeding.  She has been taking Aygestin 5 mg at night.  Reports improvement of some symptoms with this including help with sleep and increase in vaginal lubrication.  Would like to continue the medication for now.    GYN Hx:     Patient is menopausal: yes     Patient's last menstrual period was 10/28/2019.     Endometrial hyperplasia without atypia  Last Pap Smear: 2023 NIL, HPV negative    ROS: A 10 pt ROS was completed and found to be otherwise negative unless mentioned in the HPI.     PMH:   Past Medical History:   Diagnosis Date    Bladder cancer (H) 2013    Excessive or frequent menstruation 2006    Neoplasm of uncertain behavior of skin 2012       PSHx:   Past Surgical History:   Procedure Laterality Date    COLONOSCOPY N/A 3/2/2016    Procedure: COLONOSCOPY;  Surgeon: Bala Pineda MD;  Location: [facility]    CYSTOSCOPY, TRANSURETHRAL RESECTION (TUR) PROSTATE, COMBINED  7/3/2013    Procedure: COMBINED CYSTOSCOPY, TRANSURETHRAL RESECTION (TUR) PROSTATE;  Transurethral Resection of Bladder Cancer;  Surgeon: DONG Wright MD;  Location: [facility]    DILATION AND CURETTAGE, OPERATIVE HYSTEROSCOPY, COMBINED N/A 2023    Procedure: HYSTEROSCOPY, WITH DILATION AND CURETTAGE OF UTERUS;  Surgeon: Yu Strong DO;  Location: [facility]    EYE SURGERY         OBHx:   OB History    Para Term  AB Living   0 0 0 0 0 0   SAB IAB Ectopic Multiple Live Births   0 0 0 0 0       Medications:   Current Outpatient Medications   Medication Sig Dispense Refill    ascorbic acid (VITAMIN C) 500 MG tablet Take by mouth daily 1361-5629 mg daily      Cholecalciferol (VITAMIN  D-3) 5000 UNITS TABS       ciclopirox (PENLAC) 8 % external solution Apply topically daily Apply to affected nails daily.  Remove the residual build up weekly with nail polish remover or an yariel board. 6.6 mL 1    Dexchlorphen-PSE-Methscop (D-HIST D PO)       fish oil-omega-3 fatty acids 1000 MG capsule Take 1,000 mg by mouth daily 3 tablets daily      MAGNESIUM GLYCINATE 100 mg qd      multivitamin, therapeutic (THERA-VIT) TABS tablet Take 1 tablet by mouth daily      NALTREXONE HCL PO Take 4.5 mg by mouth daily      norethindrone (AYGESTIN) 5 MG tablet Take 1 tablet (5 mg) by mouth daily. 90 tablet 3    rizatriptan (MAXALT) 5 MG tablet Take 1 tablet (5 mg) by mouth at onset of headache for migraine May repeat in 2 hours. Max 6 tablets/24 hours. 15 tablet 1    UNABLE TO FIND MEDICATION NAME: Biotic 7      Zinc 10 MG LOZG        No current facility-administered medications for this visit.       Allergies:      Allergies   Allergen Reactions    Other Environmental Allergy Other (See Comments)     Airborne pollen: Runny nose, sneezing, and fatigue.  Processed meat (only tolerates fresh meat): Rash and migraine  Fermented foods: Rash and migraine  Vinegar: Rash and migraine    Penicillins Hives    Pollen Extract-Tree Extract [Pollen Extract] Other (See Comments)     Sneezing, fatigue, and runny nose    Sulfa Antibiotics Hives    Avocado Rash     Migraine    Crab Extract Rash    Gluten Meal Rash     Migraine    Milk (Cow) Rash     Migraine    Other Food Allergy Rash     migraine    Solanum Lycopersicum [Tomato] Rash     Migraine       Social History:   Social History     Socioeconomic History    Marital status:      Spouse name: Not on file    Number of children: Not on file    Years of education: Not on file    Highest education level: Not on file   Occupational History    Occupation: Investigator     Employer: [employer]   Tobacco Use    Smoking status: Never    Smokeless tobacco: Never   Vaping Use    Vaping status:  Never Used   Substance and Sexual Activity    Alcohol use: No    Drug use: No    Sexual activity: Never   Other Topics Concern    Parent/sibling w/ CABG, MI or angioplasty before 65F 55M? Yes     Comment: Brother heart attack   Social History Narrative    Not on file     Social Drivers of Health     Financial Resource Strain: Low Risk  (9/22/2023)    Financial Resource Strain     Within the past 12 months, have you or your family members you live with been unable to get utilities (heat, electricity) when it was really needed?: No   Food Insecurity: Low Risk  (9/22/2023)    Food Insecurity     Within the past 12 months, did you worry that your food would run out before you got money to buy more?: No     Within the past 12 months, did the food you bought just not last and you didn t have money to get more?: No   Transportation Needs: Low Risk  (9/22/2023)    Transportation Needs     Within the past 12 months, has lack of transportation kept you from medical appointments, getting your medicines, non-medical meetings or appointments, work, or from getting things that you need?: No   Physical Activity: Not on file   Stress: Not on file   Social Connections: Unknown (12/27/2021)    Received from [facility], [facility]    Social Connections     Frequency of Communication with Friends and Family: Not on file   Interpersonal Safety: Low Risk  (9/22/2023)    Interpersonal Safety     Do you feel physically and emotionally safe where you currently live?: Yes     Within the past 12 months, have you been hit, slapped, kicked or otherwise physically hurt by someone?: No     Within the past 12 months, have you been humiliated or emotionally abused in other ways by your partner or ex-partner?: No   Housing Stability: Low Risk  (9/22/2023)    Housing Stability     Do you have housing? : Yes     Are you worried about losing your housing?: No         Family History:  "  Family History   Problem Relation Age of Onset    Cerebrovascular Disease Mother     Hypertension Father     Cancer Father         bladder cancer,  liver cancer    Heart Disease Father     Other Cancer Father         Bladder Cancer    Heart Disease Brother 46        MI    Diabetes Brother     Coronary Artery Disease Brother     Breast Cancer No family hx of        Physical Exam:   Vitals:    01/30/25 1352   BP: (!) 165/82   BP Location: Right arm   Patient Position: Sitting   Cuff Size: Adult Regular   Pulse: 107   Resp: 16   Temp: 98.1  F (36.7  C)   TempSrc: Tympanic   Weight: 78.4 kg (172 lb 12.8 oz)   Height: 1.6 m (5' 3\")      Estimated body mass index is 30.61 kg/m  as calculated from the following:    Height as of this encounter: 1.6 m (5' 3\").    Weight as of this encounter: 78.4 kg (172 lb 12.8 oz).    General appearance: well-hydrated, A&O x 3, no apparent distress  Lungs: Equal expansion bilaterally, no accessory muscle use  Heart: No heaves or thrills.   Constitutional: See vitals  Neuro: CN II-XII grossly intact  Genitourinary:  External genitalia: no erythema, no lesions.   Anus and Perineum: Unremarkable, no visible lesions  Vagina: Normal, healthy pink mucosa without any lesions. Physiologic vaginal discharge.   Cervix: normal appearance, no cervical motion tenderness.   Uterus: normal size      Endometrial Biopsy Procedure Note      Sangita Montano  1965  [id number]    Indications:    Sangita Montano is a 59 year old year old female, who is having an endometrial biopsy for  endometrial hyperplasia surveillance.    Procedure:  Is a pregnancy test required: No.    Prior to the start of the procedure, written and verbal consent was obtained from the patient. The patient was then placed in the dorsal lithotomy position.  A speculum was placed in the vagina and the cervix visualized. The cervix was cleaned with betadine swabs x3. A tenaculum was placed on the cervix. A small plastic 5 " mm Pipelle syringe curette was passed through the cervix to the fundus with return of scant amount of tissue. This was placed in specimen jar and sent for permanent pathology. All instruments were removed.      The patient tolerated the procedure fairly well.    Post Procedure:    PLAN : Await the results of the biopsy. There were no complications. Patient was discharged in stable condition.    The patient was given post op instructions which included activity and pelvic restrictions.  She was advised to call the clinic if excessive bleeding, pelvic pain, or fever.     Follow-up based on results, see below.         Assessment and Plan:   Neyda was seen today for consult.    Diagnoses and all orders for this visit:    Endometrial hyperplasia without atypia  -     ENDOMETRIAL BIOPSY W/O CERVICAL DILATION  -     Surgical Pathology Exam  -     norethindrone (AYGESTIN) 5 MG tablet; Take 1 tablet (5 mg) by mouth daily.          Patient has a history of endometrial hyperplasia without atypia (diagnosed 5/2023) which has been treated with Progestin therapy. Biopsies after initiation of progestin therapy have been negative.     Recommended follow up:  - Recommended plan to continue progestin therapy for 12 to 24 months with continued monitoring with endometrial sampling after resolution of hyperplasia.  - If today's biopsy is again negative, plan follow up 1 year later (1/2026) for final surveillance biopsy. If persistently negative, discontinue biopsies and monitor symptoms expectantly.   -Patient is currently taking Aygestin, she would like to continue due to adjuvant improvement of sleep and vaginal lubrication on this therapy.  -If progression of hyperplasia, will plan appropriate follow up.    Yu Strong DO

## 2025-04-19 ENCOUNTER — HEALTH MAINTENANCE LETTER (OUTPATIENT)
Age: 60
End: 2025-04-19

## 2025-05-08 ENCOUNTER — OFFICE VISIT (OUTPATIENT)
Dept: UROLOGY | Facility: CLINIC | Age: 60
End: 2025-05-08
Payer: COMMERCIAL

## 2025-05-08 VITALS
HEART RATE: 105 BPM | TEMPERATURE: 98.8 F | DIASTOLIC BLOOD PRESSURE: 79 MMHG | BODY MASS INDEX: 30.48 KG/M2 | SYSTOLIC BLOOD PRESSURE: 146 MMHG | OXYGEN SATURATION: 99 % | WEIGHT: 172 LBS | HEIGHT: 63 IN

## 2025-05-08 DIAGNOSIS — C67.2 MALIGNANT NEOPLASM OF LATERAL WALL OF URINARY BLADDER (H): Primary | ICD-10-CM

## 2025-05-08 NOTE — PROGRESS NOTES
CC: 59 yo female with LG Ta bladder cancer in 2013.  Had induction BCG and no recurrences since then.  Here for cystoscopy.     Cystoscopy: after informed consent was obtained, the patient was prepped and draped in standard sterile fashion. The flexible cystoscope was introduced into the patient's urethra without difficulty. There were no strictures in the urethra. Upon entering the bladder, the UOs were orthotopic and effluxing clear urine. There were no mucosal lesions, stones or foreign objects noted in the bladder. The remainder of the exam was normal.        ASSESSMENT AND PLAN: 25-minutes were spent today reviewing the chart, interpreting results and counseling the patient regarding her bladder cancer.  We are pleased to see no evidence of recurrence.  Repeat cysto in 1 year.

## 2025-05-08 NOTE — NURSING NOTE
"Initial BP (!) 146/79   Pulse 105   Temp 98.8  F (37.1  C) (Tympanic)   Ht 1.6 m (5' 3\")   Wt 78 kg (172 lb)   LMP 10/28/2019   SpO2 99%   BMI 30.47 kg/m   Estimated body mass index is 30.47 kg/m  as calculated from the following:    Height as of this encounter: 1.6 m (5' 3\").    Weight as of this encounter: 78 kg (172 lb). .  aYima Alanis MA    "

## 2025-08-25 ENCOUNTER — PATIENT OUTREACH (OUTPATIENT)
Dept: CARE COORDINATION | Facility: CLINIC | Age: 60
End: 2025-08-25
Payer: COMMERCIAL

## (undated) DEVICE — GOWN LG DISP 9515

## (undated) DEVICE — STOCKING SLEEVE COMPRESSION CALF MED

## (undated) DEVICE — PACK LAPAROSCOPY/PELVISCOPY STD

## (undated) DEVICE — LUBRICATING JELLY 4.25OZ

## (undated) DEVICE — SOL NACL 0.9% IRRIG 3000ML BAG 07972-08

## (undated) DEVICE — SOL WATER IRRIG 1000ML BOTTLE 07139-09

## (undated) DEVICE — PAD PERI INDIV WRAP 11" 2022

## (undated) DEVICE — GLOVE BIOGEL PI ULTRATOUCH SZ 6.5 41165

## (undated) DEVICE — Device

## (undated) DEVICE — CATH INTERMITTENT CLEAN-CATH FEMALE 14FR 6" VINYL LF 420614

## (undated) DEVICE — PREP CHLORHEXIDINE 4% 4OZ (HIBICLENS) 57504

## (undated) DEVICE — GLOVE BIOGEL PI ULTRATOUCH G SZ 6.5 42165

## (undated) DEVICE — DRSG TELFA 3X8" 1238

## (undated) DEVICE — KIT PROCEDURE FLUENT IN/OUT FLOWPAK TISS TRAP FLT-112S

## (undated) DEVICE — GLOVE BIOGEL PI MICRO INDICATOR UNDERGLOVE SZ 6.5 48965

## (undated) DEVICE — SEAL SET MYOSURE ROD LENS SCOPE SINGLE USE 40-902

## (undated) DEVICE — SOL NACL 0.9% IRRIG 1000ML BOTTLE 07138-09

## (undated) RX ORDER — ACETAMINOPHEN 325 MG/1
TABLET ORAL
Status: DISPENSED
Start: 2023-05-24

## (undated) RX ORDER — ONDANSETRON 2 MG/ML
INJECTION INTRAMUSCULAR; INTRAVENOUS
Status: DISPENSED
Start: 2023-05-24

## (undated) RX ORDER — PROPOFOL 10 MG/ML
INJECTION, EMULSION INTRAVENOUS
Status: DISPENSED
Start: 2023-05-24

## (undated) RX ORDER — GABAPENTIN 300 MG/1
CAPSULE ORAL
Status: DISPENSED
Start: 2023-05-24

## (undated) RX ORDER — DEXAMETHASONE SODIUM PHOSPHATE 10 MG/ML
INJECTION, SOLUTION INTRAMUSCULAR; INTRAVENOUS
Status: DISPENSED
Start: 2023-05-24

## (undated) RX ORDER — KETOROLAC TROMETHAMINE 30 MG/ML
INJECTION, SOLUTION INTRAMUSCULAR; INTRAVENOUS
Status: DISPENSED
Start: 2023-05-24

## (undated) RX ORDER — FENTANYL CITRATE 50 UG/ML
INJECTION, SOLUTION INTRAMUSCULAR; INTRAVENOUS
Status: DISPENSED
Start: 2023-05-24

## (undated) RX ORDER — LIDOCAINE HYDROCHLORIDE 10 MG/ML
INJECTION, SOLUTION EPIDURAL; INFILTRATION; INTRACAUDAL; PERINEURAL
Status: DISPENSED
Start: 2023-05-24